# Patient Record
Sex: MALE | Race: OTHER | NOT HISPANIC OR LATINO
[De-identification: names, ages, dates, MRNs, and addresses within clinical notes are randomized per-mention and may not be internally consistent; named-entity substitution may affect disease eponyms.]

---

## 2014-08-25 RX ORDER — GABAPENTIN 400 MG/1
1 CAPSULE ORAL
Qty: 0 | Refills: 0 | COMMUNITY
Start: 2014-08-25

## 2017-01-03 ENCOUNTER — APPOINTMENT (OUTPATIENT)
Dept: INTERNAL MEDICINE | Facility: CLINIC | Age: 52
End: 2017-01-03

## 2017-01-03 VITALS
SYSTOLIC BLOOD PRESSURE: 180 MMHG | HEIGHT: 67 IN | OXYGEN SATURATION: 99 % | WEIGHT: 186 LBS | HEART RATE: 60 BPM | BODY MASS INDEX: 29.19 KG/M2 | TEMPERATURE: 97.9 F | DIASTOLIC BLOOD PRESSURE: 88 MMHG

## 2017-01-04 ENCOUNTER — CLINICAL ADVICE (OUTPATIENT)
Age: 52
End: 2017-01-04

## 2017-01-18 ENCOUNTER — LABORATORY RESULT (OUTPATIENT)
Age: 52
End: 2017-01-18

## 2017-01-18 ENCOUNTER — OUTPATIENT (OUTPATIENT)
Dept: OUTPATIENT SERVICES | Facility: HOSPITAL | Age: 52
LOS: 1 days | End: 2017-01-18

## 2017-01-18 ENCOUNTER — APPOINTMENT (OUTPATIENT)
Dept: NEPHROLOGY | Facility: CLINIC | Age: 52
End: 2017-01-18

## 2017-01-18 ENCOUNTER — OUTPATIENT (OUTPATIENT)
Dept: OUTPATIENT SERVICES | Facility: HOSPITAL | Age: 52
LOS: 1 days | End: 2017-01-18
Payer: MEDICARE

## 2017-01-18 VITALS
BODY MASS INDEX: 29.6 KG/M2 | OXYGEN SATURATION: 98 % | HEART RATE: 58 BPM | RESPIRATION RATE: 18 BRPM | WEIGHT: 189 LBS | TEMPERATURE: 98 F | DIASTOLIC BLOOD PRESSURE: 98 MMHG | SYSTOLIC BLOOD PRESSURE: 152 MMHG

## 2017-01-18 DIAGNOSIS — Z94.0 KIDNEY TRANSPLANT STATUS: ICD-10-CM

## 2017-01-18 DIAGNOSIS — Z94.0 KIDNEY TRANSPLANT STATUS: Chronic | ICD-10-CM

## 2017-01-18 DIAGNOSIS — Z95.0 PRESENCE OF CARDIAC PACEMAKER: Chronic | ICD-10-CM

## 2017-01-18 DIAGNOSIS — N18.9 CHRONIC KIDNEY DISEASE, UNSPECIFIED: ICD-10-CM

## 2017-01-18 LAB
ALBUMIN SERPL ELPH-MCNC: 4.4 G/DL — SIGNIFICANT CHANGE UP (ref 3.3–5)
ALP SERPL-CCNC: 65 U/L — SIGNIFICANT CHANGE UP (ref 40–120)
ALT FLD-CCNC: 27 U/L — SIGNIFICANT CHANGE UP (ref 10–45)
ANION GAP SERPL CALC-SCNC: 17 MMOL/L — SIGNIFICANT CHANGE UP (ref 5–17)
APPEARANCE UR: CLEAR — SIGNIFICANT CHANGE UP
AST SERPL-CCNC: 25 U/L — SIGNIFICANT CHANGE UP (ref 10–40)
BACTERIA # UR AUTO: NEGATIVE — SIGNIFICANT CHANGE UP
BASOPHILS # BLD AUTO: 0.01 K/UL — SIGNIFICANT CHANGE UP (ref 0–0.2)
BASOPHILS NFR BLD AUTO: 0.2 % — SIGNIFICANT CHANGE UP (ref 0–2)
BILIRUB SERPL-MCNC: 1 MG/DL — SIGNIFICANT CHANGE UP (ref 0.2–1.2)
BILIRUB UR-MCNC: NEGATIVE — SIGNIFICANT CHANGE UP
BUN SERPL-MCNC: 20 MG/DL — SIGNIFICANT CHANGE UP (ref 7–23)
CALCIUM SERPL-MCNC: 10 MG/DL — SIGNIFICANT CHANGE UP (ref 8.4–10.5)
CHLORIDE SERPL-SCNC: 96 MMOL/L — SIGNIFICANT CHANGE UP (ref 96–108)
CHOLEST SERPL-MCNC: 228 MG/DL — HIGH (ref 10–199)
CO2 SERPL-SCNC: 19 MMOL/L — LOW (ref 22–31)
COLOR SPEC: YELLOW — SIGNIFICANT CHANGE UP
CREAT ?TM UR-MCNC: 46 MG/DL — SIGNIFICANT CHANGE UP
CREAT SERPL-MCNC: 1.21 MG/DL — SIGNIFICANT CHANGE UP (ref 0.5–1.3)
DIFF PNL FLD: ABNORMAL
EOSINOPHIL # BLD AUTO: 1.05 K/UL — HIGH (ref 0–0.5)
EOSINOPHIL NFR BLD AUTO: 16.7 % — HIGH (ref 0–6)
EPI CELLS # UR: 1 /HPF — SIGNIFICANT CHANGE UP (ref 0–5)
GLUCOSE SERPL-MCNC: 171 MG/DL — HIGH (ref 70–99)
GLUCOSE UR QL: 250 MG/DL
HBA1C BLD-MCNC: 7.7 % — HIGH (ref 4–5.6)
HCT VFR BLD CALC: 53.2 % — HIGH (ref 39–50)
HDLC SERPL-MCNC: 46 MG/DL — SIGNIFICANT CHANGE UP (ref 40–125)
HGB BLD-MCNC: 17.1 G/DL — HIGH (ref 13–17)
IMM GRANULOCYTES NFR BLD AUTO: 0.5 % — SIGNIFICANT CHANGE UP (ref 0–1.5)
KETONES UR-MCNC: NEGATIVE — SIGNIFICANT CHANGE UP
LDH SERPL L TO P-CCNC: 238 U/L — SIGNIFICANT CHANGE UP (ref 50–242)
LEUKOCYTE ESTERASE UR-ACNC: NEGATIVE — SIGNIFICANT CHANGE UP
LIPID PNL WITH DIRECT LDL SERPL: 115 MG/DL — SIGNIFICANT CHANGE UP
LYMPHOCYTES # BLD AUTO: 1.79 K/UL — SIGNIFICANT CHANGE UP (ref 1–3.3)
LYMPHOCYTES # BLD AUTO: 28.4 % — SIGNIFICANT CHANGE UP (ref 13–44)
MAGNESIUM SERPL-MCNC: 1.8 MG/DL — SIGNIFICANT CHANGE UP (ref 1.6–2.6)
MCHC RBC-ENTMCNC: 28.3 PG — SIGNIFICANT CHANGE UP (ref 27–34)
MCHC RBC-ENTMCNC: 32.1 GM/DL — SIGNIFICANT CHANGE UP (ref 32–36)
MCV RBC AUTO: 88.1 FL — SIGNIFICANT CHANGE UP (ref 80–100)
MONOCYTES # BLD AUTO: 0.52 K/UL — SIGNIFICANT CHANGE UP (ref 0–0.9)
MONOCYTES NFR BLD AUTO: 8.3 % — SIGNIFICANT CHANGE UP (ref 2–14)
NEUTROPHILS # BLD AUTO: 2.9 K/UL — SIGNIFICANT CHANGE UP (ref 1.8–7.4)
NEUTROPHILS NFR BLD AUTO: 45.9 % — SIGNIFICANT CHANGE UP (ref 43–77)
NITRITE UR-MCNC: NEGATIVE — SIGNIFICANT CHANGE UP
PH UR: 5 — SIGNIFICANT CHANGE UP (ref 5–8)
PHOSPHATE SERPL-MCNC: 3.3 MG/DL — SIGNIFICANT CHANGE UP (ref 2.5–4.5)
PLATELET # BLD AUTO: 105 K/UL — LOW (ref 150–400)
POTASSIUM SERPL-MCNC: 5 MMOL/L — SIGNIFICANT CHANGE UP (ref 3.5–5.3)
POTASSIUM SERPL-SCNC: 5 MMOL/L — SIGNIFICANT CHANGE UP (ref 3.5–5.3)
PROT ?TM UR-MCNC: 65 MG/DL — HIGH (ref 0–12)
PROT SERPL-MCNC: 8 G/DL — SIGNIFICANT CHANGE UP (ref 6–8.3)
PROT UR-MCNC: 30 MG/DL
PROT/CREAT UR-RTO: 1.4 RATIO — HIGH (ref 0–0.2)
RBC # BLD: 6.04 M/UL — HIGH (ref 4.2–5.8)
RBC # FLD: 15.5 % — HIGH (ref 10.3–14.5)
RBC CASTS # UR COMP ASSIST: 0 /HPF — SIGNIFICANT CHANGE UP (ref 0–4)
SODIUM SERPL-SCNC: 132 MMOL/L — LOW (ref 135–145)
SP GR SPEC: 1.01 — SIGNIFICANT CHANGE UP (ref 1.01–1.02)
TACROLIMUS SERPL-MCNC: 6.3 NG/ML — SIGNIFICANT CHANGE UP
TOTAL CHOLESTEROL/HDL RATIO MEASUREMENT: 5 RATIO — SIGNIFICANT CHANGE UP (ref 3.4–9.6)
TRIGL SERPL-MCNC: 335 MG/DL — HIGH (ref 10–149)
URATE SERPL-MCNC: 7.7 MG/DL — SIGNIFICANT CHANGE UP (ref 3.4–8.8)
UROBILINOGEN FLD QL: NEGATIVE MG/DL — SIGNIFICANT CHANGE UP
WBC # BLD: 6.3 K/UL — SIGNIFICANT CHANGE UP (ref 3.8–10.5)
WBC # FLD AUTO: 6.3 K/UL — SIGNIFICANT CHANGE UP (ref 3.8–10.5)
WBC UR QL: 1 /HPF — SIGNIFICANT CHANGE UP (ref 0–5)

## 2017-01-18 PROCEDURE — 85027 COMPLETE CBC AUTOMATED: CPT

## 2017-01-18 PROCEDURE — 84100 ASSAY OF PHOSPHORUS: CPT

## 2017-01-18 PROCEDURE — 80197 ASSAY OF TACROLIMUS: CPT

## 2017-01-18 PROCEDURE — 84156 ASSAY OF PROTEIN URINE: CPT

## 2017-01-18 PROCEDURE — 87799 DETECT AGENT NOS DNA QUANT: CPT

## 2017-01-18 PROCEDURE — 80053 COMPREHEN METABOLIC PANEL: CPT

## 2017-01-18 PROCEDURE — 81001 URINALYSIS AUTO W/SCOPE: CPT

## 2017-01-18 PROCEDURE — 84550 ASSAY OF BLOOD/URIC ACID: CPT

## 2017-01-18 PROCEDURE — 83735 ASSAY OF MAGNESIUM: CPT

## 2017-01-18 PROCEDURE — 80061 LIPID PANEL: CPT

## 2017-01-18 PROCEDURE — 83615 LACTATE (LD) (LDH) ENZYME: CPT

## 2017-01-18 PROCEDURE — 83036 HEMOGLOBIN GLYCOSYLATED A1C: CPT

## 2017-01-19 LAB
CMV DNA CSF QL NAA+PROBE: SIGNIFICANT CHANGE UP
CMV DNA SPEC NAA+PROBE-LOG#: SIGNIFICANT CHANGE UP LOGIU/ML

## 2017-01-20 LAB — BKV DNA SPEC QL NAA+PROBE: SIGNIFICANT CHANGE UP

## 2017-02-03 ENCOUNTER — MEDICATION RENEWAL (OUTPATIENT)
Age: 52
End: 2017-02-03

## 2017-02-04 LAB
25(OH)D3 SERPL-MCNC: 11.4 NG/ML
CHOLEST SERPL-MCNC: 200 MG/DL
CHOLEST/HDLC SERPL: 4.7 RATIO
HBA1C MFR BLD HPLC: 7.6 %
HDLC SERPL-MCNC: 43 MG/DL
LDLC SERPL CALC-MCNC: 117 MG/DL
PSA SERPL-MCNC: 0.29 NG/ML
TRIGL SERPL-MCNC: 201 MG/DL
TSH SERPL-ACNC: 3.01 UIU/ML

## 2017-03-09 ENCOUNTER — APPOINTMENT (OUTPATIENT)
Dept: ELECTROPHYSIOLOGY | Facility: CLINIC | Age: 52
End: 2017-03-09

## 2017-03-27 ENCOUNTER — APPOINTMENT (OUTPATIENT)
Dept: NEPHROLOGY | Facility: CLINIC | Age: 52
End: 2017-03-27

## 2017-03-27 VITALS
WEIGHT: 192 LBS | SYSTOLIC BLOOD PRESSURE: 142 MMHG | RESPIRATION RATE: 16 BRPM | BODY MASS INDEX: 30.07 KG/M2 | DIASTOLIC BLOOD PRESSURE: 70 MMHG | TEMPERATURE: 97.6 F | HEART RATE: 82 BPM

## 2017-04-11 ENCOUNTER — APPOINTMENT (OUTPATIENT)
Dept: INTERNAL MEDICINE | Facility: CLINIC | Age: 52
End: 2017-04-11

## 2017-04-11 ENCOUNTER — CLINICAL ADVICE (OUTPATIENT)
Age: 52
End: 2017-04-11

## 2017-04-13 ENCOUNTER — APPOINTMENT (OUTPATIENT)
Dept: CARDIOLOGY | Facility: CLINIC | Age: 52
End: 2017-04-13

## 2017-04-13 ENCOUNTER — NON-APPOINTMENT (OUTPATIENT)
Age: 52
End: 2017-04-13

## 2017-04-13 VITALS
OXYGEN SATURATION: 97 % | HEART RATE: 60 BPM | DIASTOLIC BLOOD PRESSURE: 97 MMHG | RESPIRATION RATE: 18 BRPM | BODY MASS INDEX: 30.13 KG/M2 | SYSTOLIC BLOOD PRESSURE: 197 MMHG | HEIGHT: 67 IN | WEIGHT: 192 LBS

## 2017-04-13 VITALS — DIASTOLIC BLOOD PRESSURE: 93 MMHG | SYSTOLIC BLOOD PRESSURE: 189 MMHG

## 2017-06-01 ENCOUNTER — RX RENEWAL (OUTPATIENT)
Age: 52
End: 2017-06-01

## 2017-06-12 ENCOUNTER — APPOINTMENT (OUTPATIENT)
Dept: ELECTROPHYSIOLOGY | Facility: CLINIC | Age: 52
End: 2017-06-12

## 2017-06-12 VITALS — SYSTOLIC BLOOD PRESSURE: 179 MMHG | DIASTOLIC BLOOD PRESSURE: 92 MMHG

## 2017-06-28 ENCOUNTER — MEDICATION RENEWAL (OUTPATIENT)
Age: 52
End: 2017-06-28

## 2017-06-30 ENCOUNTER — APPOINTMENT (OUTPATIENT)
Dept: INTERNAL MEDICINE | Facility: CLINIC | Age: 52
End: 2017-06-30

## 2017-06-30 VITALS
HEIGHT: 67 IN | BODY MASS INDEX: 29.03 KG/M2 | TEMPERATURE: 98.7 F | SYSTOLIC BLOOD PRESSURE: 170 MMHG | HEART RATE: 60 BPM | OXYGEN SATURATION: 98 % | WEIGHT: 185 LBS | DIASTOLIC BLOOD PRESSURE: 80 MMHG

## 2017-06-30 LAB — HBA1C MFR BLD HPLC: 6.7

## 2017-07-10 ENCOUNTER — APPOINTMENT (OUTPATIENT)
Dept: NEPHROLOGY | Facility: CLINIC | Age: 52
End: 2017-07-10

## 2017-07-10 VITALS
DIASTOLIC BLOOD PRESSURE: 94 MMHG | BODY MASS INDEX: 29.03 KG/M2 | SYSTOLIC BLOOD PRESSURE: 191 MMHG | WEIGHT: 185 LBS | TEMPERATURE: 98.3 F | HEIGHT: 67 IN | HEART RATE: 60 BPM | RESPIRATION RATE: 17 BRPM

## 2017-07-10 RX ORDER — PEN NEEDLE, DIABETIC 32GX 5/32"
32G X 4 MM NEEDLE, DISPOSABLE MISCELLANEOUS
Qty: 100 | Refills: 0 | Status: ACTIVE | COMMUNITY
Start: 2017-06-03

## 2017-07-27 ENCOUNTER — APPOINTMENT (OUTPATIENT)
Dept: CARDIOLOGY | Facility: CLINIC | Age: 52
End: 2017-07-27
Payer: MEDICAID

## 2017-07-27 ENCOUNTER — NON-APPOINTMENT (OUTPATIENT)
Age: 52
End: 2017-07-27

## 2017-07-27 VITALS — DIASTOLIC BLOOD PRESSURE: 87 MMHG | HEART RATE: 59 BPM | SYSTOLIC BLOOD PRESSURE: 153 MMHG

## 2017-07-27 VITALS
RESPIRATION RATE: 18 BRPM | OXYGEN SATURATION: 98 % | SYSTOLIC BLOOD PRESSURE: 214 MMHG | DIASTOLIC BLOOD PRESSURE: 105 MMHG | BODY MASS INDEX: 29.03 KG/M2 | HEIGHT: 67 IN | HEART RATE: 60 BPM | WEIGHT: 185 LBS

## 2017-07-27 PROCEDURE — 93000 ELECTROCARDIOGRAM COMPLETE: CPT

## 2017-07-27 PROCEDURE — 99214 OFFICE O/P EST MOD 30 MIN: CPT

## 2017-08-28 ENCOUNTER — OTHER (OUTPATIENT)
Age: 52
End: 2017-08-28

## 2017-08-29 ENCOUNTER — OTHER (OUTPATIENT)
Age: 52
End: 2017-08-29

## 2017-09-08 ENCOUNTER — RX RENEWAL (OUTPATIENT)
Age: 52
End: 2017-09-08

## 2017-09-11 ENCOUNTER — APPOINTMENT (OUTPATIENT)
Dept: NEPHROLOGY | Facility: CLINIC | Age: 52
End: 2017-09-11

## 2017-10-02 ENCOUNTER — APPOINTMENT (OUTPATIENT)
Dept: ELECTROPHYSIOLOGY | Facility: CLINIC | Age: 52
End: 2017-10-02

## 2017-10-10 ENCOUNTER — MEDICATION RENEWAL (OUTPATIENT)
Age: 52
End: 2017-10-10

## 2017-10-10 ENCOUNTER — RX RENEWAL (OUTPATIENT)
Age: 52
End: 2017-10-10

## 2017-10-10 RX ORDER — BLOOD SUGAR DIAGNOSTIC
STRIP MISCELLANEOUS 3 TIMES DAILY
Qty: 270 | Refills: 2 | Status: ACTIVE | COMMUNITY
Start: 2017-04-11 | End: 1900-01-01

## 2017-10-10 RX ORDER — LANCETS 28 GAUGE
EACH MISCELLANEOUS
Qty: 120 | Refills: 4 | Status: ACTIVE | COMMUNITY
Start: 2017-04-11 | End: 1900-01-01

## 2017-10-12 ENCOUNTER — APPOINTMENT (OUTPATIENT)
Dept: NEPHROLOGY | Facility: CLINIC | Age: 52
End: 2017-10-12
Payer: MEDICAID

## 2017-10-12 VITALS
SYSTOLIC BLOOD PRESSURE: 156 MMHG | HEART RATE: 62 BPM | TEMPERATURE: 98 F | HEIGHT: 67 IN | WEIGHT: 184 LBS | DIASTOLIC BLOOD PRESSURE: 78 MMHG | OXYGEN SATURATION: 99 % | BODY MASS INDEX: 28.88 KG/M2 | RESPIRATION RATE: 15 BRPM

## 2017-10-12 VITALS — DIASTOLIC BLOOD PRESSURE: 80 MMHG | SYSTOLIC BLOOD PRESSURE: 136 MMHG

## 2017-10-12 PROCEDURE — 99214 OFFICE O/P EST MOD 30 MIN: CPT

## 2017-10-16 ENCOUNTER — MEDICATION RENEWAL (OUTPATIENT)
Age: 52
End: 2017-10-16

## 2017-10-16 ENCOUNTER — RX RENEWAL (OUTPATIENT)
Age: 52
End: 2017-10-16

## 2017-10-19 ENCOUNTER — APPOINTMENT (OUTPATIENT)
Dept: ELECTROPHYSIOLOGY | Facility: CLINIC | Age: 52
End: 2017-10-19
Payer: MEDICAID

## 2017-10-19 ENCOUNTER — NON-APPOINTMENT (OUTPATIENT)
Age: 52
End: 2017-10-19

## 2017-10-19 ENCOUNTER — APPOINTMENT (OUTPATIENT)
Dept: CARDIOLOGY | Facility: CLINIC | Age: 52
End: 2017-10-19
Payer: MEDICAID

## 2017-10-19 VITALS
DIASTOLIC BLOOD PRESSURE: 82 MMHG | OXYGEN SATURATION: 99 % | HEART RATE: 60 BPM | RESPIRATION RATE: 14 BRPM | SYSTOLIC BLOOD PRESSURE: 179 MMHG

## 2017-10-19 PROCEDURE — 99214 OFFICE O/P EST MOD 30 MIN: CPT

## 2017-10-19 PROCEDURE — 93000 ELECTROCARDIOGRAM COMPLETE: CPT

## 2017-10-19 PROCEDURE — 93280 PM DEVICE PROGR EVAL DUAL: CPT

## 2017-11-17 ENCOUNTER — APPOINTMENT (OUTPATIENT)
Dept: INTERNAL MEDICINE | Facility: CLINIC | Age: 52
End: 2017-11-17
Payer: MEDICAID

## 2017-11-17 VITALS
DIASTOLIC BLOOD PRESSURE: 80 MMHG | HEIGHT: 67 IN | HEART RATE: 61 BPM | BODY MASS INDEX: 28.88 KG/M2 | SYSTOLIC BLOOD PRESSURE: 178 MMHG | OXYGEN SATURATION: 98 % | WEIGHT: 184 LBS | TEMPERATURE: 98.1 F

## 2017-11-17 DIAGNOSIS — D64.9 ANEMIA, UNSPECIFIED: ICD-10-CM

## 2017-11-17 DIAGNOSIS — K59.09 OTHER CONSTIPATION: ICD-10-CM

## 2017-11-17 PROCEDURE — 99214 OFFICE O/P EST MOD 30 MIN: CPT | Mod: 25

## 2017-11-17 PROCEDURE — 90688 IIV4 VACCINE SPLT 0.5 ML IM: CPT

## 2017-11-17 PROCEDURE — G0008: CPT

## 2017-11-17 PROCEDURE — 83036 HEMOGLOBIN GLYCOSYLATED A1C: CPT | Mod: QW

## 2017-11-17 PROCEDURE — 82962 GLUCOSE BLOOD TEST: CPT

## 2017-11-17 RX ORDER — PEN NEEDLE, DIABETIC 31 GX3/16"
31G X 5 MM NEEDLE, DISPOSABLE MISCELLANEOUS
Qty: 100 | Refills: 0 | Status: ACTIVE | COMMUNITY
Start: 2017-10-10

## 2017-11-25 LAB
GLUCOSE BLDC GLUCOMTR-MCNC: 170
HBA1C MFR BLD HPLC: 7.1

## 2017-12-21 ENCOUNTER — APPOINTMENT (OUTPATIENT)
Dept: ENDOCRINOLOGY | Facility: CLINIC | Age: 52
End: 2017-12-21
Payer: MEDICAID

## 2017-12-21 VITALS
BODY MASS INDEX: 28.88 KG/M2 | DIASTOLIC BLOOD PRESSURE: 95 MMHG | HEART RATE: 60 BPM | HEIGHT: 67 IN | SYSTOLIC BLOOD PRESSURE: 140 MMHG | WEIGHT: 184 LBS | OXYGEN SATURATION: 98 %

## 2017-12-21 DIAGNOSIS — Z86.79 PERSONAL HISTORY OF OTHER DISEASES OF THE CIRCULATORY SYSTEM: ICD-10-CM

## 2017-12-21 DIAGNOSIS — J44.9 CHRONIC OBSTRUCTIVE PULMONARY DISEASE, UNSPECIFIED: ICD-10-CM

## 2017-12-21 DIAGNOSIS — Z86.19 PERSONAL HISTORY OF OTHER INFECTIOUS AND PARASITIC DISEASES: ICD-10-CM

## 2017-12-21 PROCEDURE — 99205 OFFICE O/P NEW HI 60 MIN: CPT | Mod: 25

## 2017-12-21 PROCEDURE — 36415 COLL VENOUS BLD VENIPUNCTURE: CPT

## 2017-12-21 PROCEDURE — 82962 GLUCOSE BLOOD TEST: CPT

## 2017-12-26 ENCOUNTER — OTHER (OUTPATIENT)
Age: 52
End: 2017-12-26

## 2018-01-05 ENCOUNTER — RX RENEWAL (OUTPATIENT)
Age: 53
End: 2018-01-05

## 2018-01-08 ENCOUNTER — MEDICATION RENEWAL (OUTPATIENT)
Age: 53
End: 2018-01-08

## 2018-01-11 ENCOUNTER — APPOINTMENT (OUTPATIENT)
Dept: NEPHROLOGY | Facility: CLINIC | Age: 53
End: 2018-01-11
Payer: MEDICAID

## 2018-01-11 ENCOUNTER — LABORATORY RESULT (OUTPATIENT)
Age: 53
End: 2018-01-11

## 2018-01-11 VITALS
OXYGEN SATURATION: 99 % | RESPIRATION RATE: 16 BRPM | HEIGHT: 67 IN | DIASTOLIC BLOOD PRESSURE: 75 MMHG | BODY MASS INDEX: 28.88 KG/M2 | HEART RATE: 59 BPM | WEIGHT: 184 LBS | TEMPERATURE: 98.3 F | SYSTOLIC BLOOD PRESSURE: 155 MMHG

## 2018-01-11 PROCEDURE — 99214 OFFICE O/P EST MOD 30 MIN: CPT

## 2018-01-18 ENCOUNTER — APPOINTMENT (OUTPATIENT)
Dept: CARDIOLOGY | Facility: CLINIC | Age: 53
End: 2018-01-18
Payer: MEDICAID

## 2018-01-18 ENCOUNTER — NON-APPOINTMENT (OUTPATIENT)
Age: 53
End: 2018-01-18

## 2018-01-18 VITALS
HEIGHT: 67 IN | DIASTOLIC BLOOD PRESSURE: 88 MMHG | HEART RATE: 60 BPM | WEIGHT: 184 LBS | BODY MASS INDEX: 28.88 KG/M2 | SYSTOLIC BLOOD PRESSURE: 160 MMHG | OXYGEN SATURATION: 96 %

## 2018-01-18 VITALS — DIASTOLIC BLOOD PRESSURE: 73 MMHG | SYSTOLIC BLOOD PRESSURE: 135 MMHG

## 2018-01-18 PROCEDURE — 99214 OFFICE O/P EST MOD 30 MIN: CPT

## 2018-01-18 PROCEDURE — 93000 ELECTROCARDIOGRAM COMPLETE: CPT

## 2018-01-18 RX ORDER — ASPIRIN 81 MG/1
81 TABLET ORAL
Qty: 90 | Refills: 3 | Status: DISCONTINUED | COMMUNITY
Start: 2017-10-10 | End: 2018-01-18

## 2018-01-23 ENCOUNTER — APPOINTMENT (OUTPATIENT)
Dept: VASCULAR SURGERY | Facility: CLINIC | Age: 53
End: 2018-01-23
Payer: MEDICAID

## 2018-01-23 ENCOUNTER — APPOINTMENT (OUTPATIENT)
Dept: ELECTROPHYSIOLOGY | Facility: CLINIC | Age: 53
End: 2018-01-23

## 2018-01-23 VITALS
SYSTOLIC BLOOD PRESSURE: 191 MMHG | HEART RATE: 60 BPM | WEIGHT: 184 LBS | HEIGHT: 67 IN | DIASTOLIC BLOOD PRESSURE: 89 MMHG | BODY MASS INDEX: 28.88 KG/M2 | TEMPERATURE: 98 F

## 2018-01-23 DIAGNOSIS — H26.9 UNSPECIFIED CATARACT: ICD-10-CM

## 2018-01-23 DIAGNOSIS — N18.6 END STAGE RENAL DISEASE: ICD-10-CM

## 2018-01-23 DIAGNOSIS — Z83.3 FAMILY HISTORY OF DIABETES MELLITUS: ICD-10-CM

## 2018-01-23 DIAGNOSIS — Z92.89 PERSONAL HISTORY OF OTHER MEDICAL TREATMENT: ICD-10-CM

## 2018-01-23 DIAGNOSIS — Z86.39 PERSONAL HISTORY OF OTHER ENDOCRINE, NUTRITIONAL AND METABOLIC DISEASE: ICD-10-CM

## 2018-01-23 PROCEDURE — 93970 EXTREMITY STUDY: CPT

## 2018-01-23 PROCEDURE — 99202 OFFICE O/P NEW SF 15 MIN: CPT

## 2018-01-30 LAB
ALBUMIN SERPL ELPH-MCNC: 4.5 G/DL
ALP BLD-CCNC: 60 U/L
ALT SERPL-CCNC: 33 U/L
ANION GAP SERPL CALC-SCNC: 15 MMOL/L
APPEARANCE: CLEAR
AST SERPL-CCNC: 28 U/L
BASOPHILS # BLD AUTO: 0.01 K/UL
BASOPHILS NFR BLD AUTO: 0.1 %
BILIRUB SERPL-MCNC: 0.8 MG/DL
BILIRUBIN URINE: NEGATIVE
BKV DNA SPEC QL NAA+PROBE: NORMAL
BLOOD URINE: NEGATIVE
BUN SERPL-MCNC: 22 MG/DL
CALCIUM SERPL-MCNC: 10.1 MG/DL
CHLORIDE SERPL-SCNC: 99 MMOL/L
CHOLEST SERPL-MCNC: 178 MG/DL
CHOLEST/HDLC SERPL: 4.7 RATIO
CO2 SERPL-SCNC: 25 MMOL/L
COLOR: YELLOW
CREAT SERPL-MCNC: 1.53 MG/DL
CREAT SPEC-SCNC: 103 MG/DL
CREAT/PROT UR: 0.7 RATIO
EOSINOPHIL # BLD AUTO: 1.27 K/UL
EOSINOPHIL NFR BLD AUTO: 16 %
GLUCOSE QUALITATIVE U: 1000 MG/DL
GLUCOSE SERPL-MCNC: 181 MG/DL
HBA1C MFR BLD HPLC: 7 %
HCT VFR BLD CALC: 49.8 %
HDLC SERPL-MCNC: 38 MG/DL
HGB BLD-MCNC: 15.5 G/DL
IMM GRANULOCYTES NFR BLD AUTO: 0.3 %
KETONES URINE: NEGATIVE
LDH SERPL-CCNC: 230 U/L
LDLC SERPL CALC-MCNC: 89 MG/DL
LEUKOCYTE ESTERASE URINE: NEGATIVE
LYMPHOCYTES # BLD AUTO: 2.18 K/UL
LYMPHOCYTES NFR BLD AUTO: 27.4 %
MAGNESIUM SERPL-MCNC: 1.9 MG/DL
MAN DIFF?: NORMAL
MCHC RBC-ENTMCNC: 27.3 PG
MCHC RBC-ENTMCNC: 31.1 GM/DL
MCV RBC AUTO: 87.8 FL
MONOCYTES # BLD AUTO: 0.7 K/UL
MONOCYTES NFR BLD AUTO: 8.8 %
NEUTROPHILS # BLD AUTO: 3.77 K/UL
NEUTROPHILS NFR BLD AUTO: 47.4 %
NITRITE URINE: NEGATIVE
PH URINE: 5
PHOSPHATE SERPL-MCNC: 3 MG/DL
PLATELET # BLD AUTO: 135 K/UL
POTASSIUM SERPL-SCNC: 5.6 MMOL/L
PROT SERPL-MCNC: 8.8 G/DL
PROT UR-MCNC: 69 MG/DL
PROTEIN URINE: 100 MG/DL
RBC # BLD: 5.67 M/UL
RBC # FLD: 15.1 %
SODIUM SERPL-SCNC: 139 MMOL/L
SPECIFIC GRAVITY URINE: 1.02
TACROLIMUS SERPL-MCNC: 6.3 NG/ML
TRIGL SERPL-MCNC: 257 MG/DL
URATE SERPL-MCNC: 8 MG/DL
UROBILINOGEN URINE: NEGATIVE MG/DL
WBC # FLD AUTO: 7.95 K/UL

## 2018-02-04 PROBLEM — J44.9 COPD (CHRONIC OBSTRUCTIVE PULMONARY DISEASE): Status: ACTIVE | Noted: 2018-02-04

## 2018-02-04 PROBLEM — Z86.19 HISTORY OF HEPATITIS: Status: RESOLVED | Noted: 2018-02-04 | Resolved: 2018-02-04

## 2018-02-04 LAB
25(OH)D3 SERPL-MCNC: 16.5 NG/ML
ALBUMIN SERPL ELPH-MCNC: 4.8 G/DL
ALP BLD-CCNC: 58 U/L
ALT SERPL-CCNC: 20 U/L
ANION GAP SERPL CALC-SCNC: 18 MMOL/L
AST SERPL-CCNC: 21 U/L
BASOPHILS # BLD AUTO: 0.02 K/UL
BASOPHILS NFR BLD AUTO: 0.2 %
BILIRUB SERPL-MCNC: 0.7 MG/DL
BUN SERPL-MCNC: 30 MG/DL
CALCIUM SERPL-MCNC: 10.1 MG/DL
CHLORIDE SERPL-SCNC: 99 MMOL/L
CHOLEST SERPL-MCNC: 177 MG/DL
CHOLEST/HDLC SERPL: 3.8 RATIO
CO2 SERPL-SCNC: 25 MMOL/L
CREAT SERPL-MCNC: 1.2 MG/DL
EOSINOPHIL # BLD AUTO: 1.41 K/UL
EOSINOPHIL NFR BLD AUTO: 16.6 %
GLUCOSE BLDC GLUCOMTR-MCNC: 198
GLUCOSE SERPL-MCNC: 142 MG/DL
HCT VFR BLD CALC: 47.7 %
HDLC SERPL-MCNC: 46 MG/DL
HGB BLD-MCNC: 14.2 G/DL
IMM GRANULOCYTES NFR BLD AUTO: 0.2 %
LDLC SERPL CALC-MCNC: 95 MG/DL
LYMPHOCYTES # BLD AUTO: 2.73 K/UL
LYMPHOCYTES NFR BLD AUTO: 32.1 %
MAN DIFF?: NORMAL
MCHC RBC-ENTMCNC: 26.6 PG
MCHC RBC-ENTMCNC: 29.8 GM/DL
MCV RBC AUTO: 89.5 FL
MONOCYTES # BLD AUTO: 0.67 K/UL
MONOCYTES NFR BLD AUTO: 7.9 %
NEUTROPHILS # BLD AUTO: 3.66 K/UL
NEUTROPHILS NFR BLD AUTO: 43 %
PLATELET # BLD AUTO: 140 K/UL
POTASSIUM SERPL-SCNC: 5.5 MMOL/L
PROT SERPL-MCNC: 8.5 G/DL
RBC # BLD: 5.33 M/UL
RBC # FLD: 15.3 %
SODIUM SERPL-SCNC: 142 MMOL/L
TRIGL SERPL-MCNC: 180 MG/DL
VIT B12 SERPL-MCNC: 223 PG/ML
WBC # FLD AUTO: 8.51 K/UL

## 2018-02-15 ENCOUNTER — RX RENEWAL (OUTPATIENT)
Age: 53
End: 2018-02-15

## 2018-03-20 ENCOUNTER — APPOINTMENT (OUTPATIENT)
Dept: ENDOCRINOLOGY | Facility: CLINIC | Age: 53
End: 2018-03-20

## 2018-03-23 ENCOUNTER — APPOINTMENT (OUTPATIENT)
Dept: INTERNAL MEDICINE | Facility: CLINIC | Age: 53
End: 2018-03-23
Payer: MEDICAID

## 2018-03-23 VITALS
OXYGEN SATURATION: 98 % | HEIGHT: 67 IN | TEMPERATURE: 97.8 F | BODY MASS INDEX: 29.35 KG/M2 | HEART RATE: 58 BPM | SYSTOLIC BLOOD PRESSURE: 190 MMHG | WEIGHT: 187 LBS | DIASTOLIC BLOOD PRESSURE: 98 MMHG

## 2018-03-23 VITALS — SYSTOLIC BLOOD PRESSURE: 170 MMHG | DIASTOLIC BLOOD PRESSURE: 100 MMHG

## 2018-03-23 PROCEDURE — 99214 OFFICE O/P EST MOD 30 MIN: CPT

## 2018-03-29 ENCOUNTER — APPOINTMENT (OUTPATIENT)
Dept: NEPHROLOGY | Facility: CLINIC | Age: 53
End: 2018-03-29
Payer: MEDICAID

## 2018-03-29 ENCOUNTER — LABORATORY RESULT (OUTPATIENT)
Age: 53
End: 2018-03-29

## 2018-03-29 VITALS
OXYGEN SATURATION: 99 % | HEART RATE: 60 BPM | TEMPERATURE: 98.2 F | HEIGHT: 67 IN | SYSTOLIC BLOOD PRESSURE: 204 MMHG | DIASTOLIC BLOOD PRESSURE: 94 MMHG | BODY MASS INDEX: 32.18 KG/M2 | WEIGHT: 205 LBS | RESPIRATION RATE: 14 BRPM

## 2018-03-29 VITALS — SYSTOLIC BLOOD PRESSURE: 148 MMHG | DIASTOLIC BLOOD PRESSURE: 78 MMHG

## 2018-03-29 PROCEDURE — 99214 OFFICE O/P EST MOD 30 MIN: CPT

## 2018-03-29 RX ORDER — AMOXICILLIN AND CLAVULANATE POTASSIUM 500; 125 MG/1; MG/1
500-125 TABLET, FILM COATED ORAL
Qty: 14 | Refills: 0 | Status: DISCONTINUED | COMMUNITY
Start: 2017-12-05

## 2018-03-29 RX ORDER — SILVER SULFADIAZINE 10 MG/G
1 CREAM TOPICAL
Qty: 50 | Refills: 0 | Status: DISCONTINUED | COMMUNITY
Start: 2017-12-30

## 2018-03-30 LAB
ALBUMIN SERPL ELPH-MCNC: 4.3 G/DL
ALP BLD-CCNC: 61 U/L
ALT SERPL-CCNC: 27 U/L
ANION GAP SERPL CALC-SCNC: 18 MMOL/L
APPEARANCE: CLEAR
AST SERPL-CCNC: 29 U/L
BASOPHILS # BLD AUTO: 0.02 K/UL
BASOPHILS NFR BLD AUTO: 0.2 %
BILIRUB SERPL-MCNC: 0.7 MG/DL
BILIRUBIN URINE: NEGATIVE
BKV DNA SPEC QL NAA+PROBE: NORMAL
BLOOD URINE: ABNORMAL
BUN SERPL-MCNC: 33 MG/DL
CALCIUM SERPL-MCNC: 10.6 MG/DL
CHLORIDE SERPL-SCNC: 100 MMOL/L
CHOLEST SERPL-MCNC: 213 MG/DL
CHOLEST/HDLC SERPL: 5.5 RATIO
CMV DNA SPEC QL NAA+PROBE: NOT DETECTED IU/ML
CMVPCR LOG: NOT DETECTED LOGIU/ML
CO2 SERPL-SCNC: 20 MMOL/L
COLOR: YELLOW
CREAT SERPL-MCNC: 1.46 MG/DL
CREAT SPEC-SCNC: 102 MG/DL
CREAT/PROT UR: 0.9 RATIO
EOSINOPHIL # BLD AUTO: 1.02 K/UL
EOSINOPHIL NFR BLD AUTO: 12.4 %
GLUCOSE QUALITATIVE U: 250 MG/DL
GLUCOSE SERPL-MCNC: 108 MG/DL
HBA1C MFR BLD HPLC: 7.6 %
HCT VFR BLD CALC: 54.4 %
HDLC SERPL-MCNC: 39 MG/DL
HGB BLD-MCNC: 16.9 G/DL
IMM GRANULOCYTES NFR BLD AUTO: 0.4 %
KETONES URINE: NEGATIVE
LDH SERPL-CCNC: 254 U/L
LDLC SERPL CALC-MCNC: 111 MG/DL
LEUKOCYTE ESTERASE URINE: NEGATIVE
LYMPHOCYTES # BLD AUTO: 2.37 K/UL
LYMPHOCYTES NFR BLD AUTO: 28.8 %
MAGNESIUM SERPL-MCNC: 2 MG/DL
MAN DIFF?: NORMAL
MCHC RBC-ENTMCNC: 27.9 PG
MCHC RBC-ENTMCNC: 31.1 GM/DL
MCV RBC AUTO: 89.8 FL
MONOCYTES # BLD AUTO: 1.08 K/UL
MONOCYTES NFR BLD AUTO: 13.1 %
NEUTROPHILS # BLD AUTO: 3.7 K/UL
NEUTROPHILS NFR BLD AUTO: 45.1 %
NITRITE URINE: NEGATIVE
PH URINE: 5.5
PHOSPHATE SERPL-MCNC: 2.5 MG/DL
PLATELET # BLD AUTO: 114 K/UL
POTASSIUM SERPL-SCNC: 4.9 MMOL/L
PROT SERPL-MCNC: 8.5 G/DL
PROT UR-MCNC: 96 MG/DL
PROTEIN URINE: 100 MG/DL
RBC # BLD: 6.06 M/UL
RBC # FLD: 14.7 %
SODIUM SERPL-SCNC: 138 MMOL/L
SPECIFIC GRAVITY URINE: 1.02
TACROLIMUS SERPL-MCNC: 8.8 NG/ML
TRIGL SERPL-MCNC: 316 MG/DL
URATE SERPL-MCNC: 8 MG/DL
UROBILINOGEN URINE: NEGATIVE MG/DL
WBC # FLD AUTO: 8.22 K/UL

## 2018-05-03 ENCOUNTER — APPOINTMENT (OUTPATIENT)
Dept: CARDIOLOGY | Facility: CLINIC | Age: 53
End: 2018-05-03
Payer: MEDICAID

## 2018-05-03 ENCOUNTER — APPOINTMENT (OUTPATIENT)
Dept: ELECTROPHYSIOLOGY | Facility: CLINIC | Age: 53
End: 2018-05-03
Payer: MEDICAID

## 2018-05-03 ENCOUNTER — NON-APPOINTMENT (OUTPATIENT)
Age: 53
End: 2018-05-03

## 2018-05-03 VITALS — SYSTOLIC BLOOD PRESSURE: 155 MMHG | DIASTOLIC BLOOD PRESSURE: 90 MMHG

## 2018-05-03 VITALS
HEIGHT: 67 IN | HEART RATE: 60 BPM | BODY MASS INDEX: 32.18 KG/M2 | WEIGHT: 205 LBS | OXYGEN SATURATION: 99 % | RESPIRATION RATE: 14 BRPM | DIASTOLIC BLOOD PRESSURE: 108 MMHG | SYSTOLIC BLOOD PRESSURE: 235 MMHG

## 2018-05-03 PROCEDURE — 99214 OFFICE O/P EST MOD 30 MIN: CPT

## 2018-05-03 PROCEDURE — 93280 PM DEVICE PROGR EVAL DUAL: CPT

## 2018-05-03 PROCEDURE — 93000 ELECTROCARDIOGRAM COMPLETE: CPT | Mod: 59

## 2018-05-25 ENCOUNTER — RX RENEWAL (OUTPATIENT)
Age: 53
End: 2018-05-25

## 2018-06-05 ENCOUNTER — MEDICATION RENEWAL (OUTPATIENT)
Age: 53
End: 2018-06-05

## 2018-06-22 ENCOUNTER — RX RENEWAL (OUTPATIENT)
Age: 53
End: 2018-06-22

## 2018-07-01 ENCOUNTER — OUTPATIENT (OUTPATIENT)
Dept: OUTPATIENT SERVICES | Facility: HOSPITAL | Age: 53
LOS: 1 days | End: 2018-07-01
Payer: MEDICAID

## 2018-07-01 DIAGNOSIS — Z94.0 KIDNEY TRANSPLANT STATUS: Chronic | ICD-10-CM

## 2018-07-01 DIAGNOSIS — Z95.0 PRESENCE OF CARDIAC PACEMAKER: Chronic | ICD-10-CM

## 2018-07-02 ENCOUNTER — LABORATORY RESULT (OUTPATIENT)
Age: 53
End: 2018-07-02

## 2018-07-02 ENCOUNTER — APPOINTMENT (OUTPATIENT)
Dept: NEPHROLOGY | Facility: CLINIC | Age: 53
End: 2018-07-02
Payer: MEDICAID

## 2018-07-02 VITALS — DIASTOLIC BLOOD PRESSURE: 70 MMHG | SYSTOLIC BLOOD PRESSURE: 140 MMHG

## 2018-07-02 VITALS
SYSTOLIC BLOOD PRESSURE: 214 MMHG | TEMPERATURE: 98.5 F | HEIGHT: 67 IN | OXYGEN SATURATION: 97 % | BODY MASS INDEX: 28.71 KG/M2 | DIASTOLIC BLOOD PRESSURE: 108 MMHG | RESPIRATION RATE: 16 BRPM | WEIGHT: 182.9 LBS | HEART RATE: 60 BPM

## 2018-07-02 PROCEDURE — 99214 OFFICE O/P EST MOD 30 MIN: CPT

## 2018-07-06 ENCOUNTER — APPOINTMENT (OUTPATIENT)
Dept: INTERNAL MEDICINE | Facility: CLINIC | Age: 53
End: 2018-07-06
Payer: MEDICAID

## 2018-07-06 VITALS
DIASTOLIC BLOOD PRESSURE: 80 MMHG | HEART RATE: 59 BPM | SYSTOLIC BLOOD PRESSURE: 150 MMHG | OXYGEN SATURATION: 98 % | TEMPERATURE: 97.9 F

## 2018-07-06 PROCEDURE — 99396 PREV VISIT EST AGE 40-64: CPT | Mod: 25

## 2018-07-06 PROCEDURE — 99215 OFFICE O/P EST HI 40 MIN: CPT

## 2018-07-06 NOTE — PHYSICAL EXAM
[No Acute Distress] : no acute distress [Well Nourished] : well nourished [Supple] : supple [No Lymphadenopathy] : no lymphadenopathy [Clear to Auscultation] : lungs were clear to auscultation bilaterally [No Accessory Muscle Use] : no accessory muscle use [Regular Rhythm] : with a regular rhythm [Normal S1, S2] : normal S1 and S2 [Normal Oropharynx] : the oropharynx was normal [Soft] : abdomen soft [Non Tender] : non-tender [Normal Posterior Cervical Nodes] : no posterior cervical lymphadenopathy [Normal Anterior Cervical Nodes] : no anterior cervical lymphadenopathy [Normal Affect] : the affect was normal [Normal Insight/Judgement] : insight and judgment were intact [de-identified] : pinpoint pupils [de-identified] : L distal hand + thrill from AV fistula [de-identified] : L inner foot with 1cm healing wound; 2nd toe with hyperpigmentation on tip [de-identified] : walks with a cane

## 2018-07-06 NOTE — HISTORY OF PRESENT ILLNESS
[FreeTextEntry1] : mrsa bacteremia\par kidney transplant\par Htn. HLD\par T2DM\par cpe [de-identified] : 52yo M with hx of kidney transplant (on immunosuppressives), T2DM, HTN, HLD, CAD and PPM here for CPE and follow up after hospitalization.\par \par Hospitalization:\par Pt was hospitalized at [facility] in [place] from 6/19 to 6/25/2018 with dc dx of MRSA bacteremia. he is currently on daptomycin 500mg iv Q24 until 8/1/18. He had a script for linezolid that was for a few days past dc. I had previously spoke to the SW from the hospitalization who confirm part of the hx. \par \par Pt was SOB while driving he says back to [place] from [place]. In [place] he began to feel unwell, felt SOB. He stopped at the side of the road. He was later found unresponsive in the car. He was confused and had elevated temp (hyperthermia). Head CT showed no acute process but indeterminate lacunar infarct, CT thorax showed atelectasis. Pt had blood cx which grew MRSA - ECHO showed no valve involvement. Pt had a L foot lesion believed to be the source of the infection. The BERNARD results showed PAD.   \par \par He is set up with [place] based co called  [facility] for IV infusion. Nurse visited for initial 2 days but no longer.\par \par [ ]Needs weekly cbc, cpk, bmp   [ ] needs ID referral (already has appt scheduled  [ ] Needs home health referral [ ] podiatry

## 2018-07-06 NOTE — ASSESSMENT
[FreeTextEntry1] : 54yo M with esrd, T2DM, HTN here for cpe and hospital follow up.\par \par MRSA bacteremia - on abx until 8/1/18 - will place home health referral to get assistance with vitals (BP, temp) and infusion of abx as pt was out of saline flushes and used tap water - I informed pt to use only normal saline for PICC flushes. \par \par Kidney transplant - records state pt had an cr of 2.1 that later normalized to 1.1 while hospitalized - labs last week with nephrology show normal kidney function (cr 1.2)\par \par T2DM on insulin, controlled, cont meds. sees ophthol Needs to see podiatry\par \par HTN - systolic BP elevated - reassess at next OV - had increased losartan to 50mg in the spring\par \par HCM - cpe today, plan to repeat labs next week\par            had colo\par            Hep C neg\par            Need to revisit vaccinations\par \par MISC - needs letter for social security changed regarding his mobility\par \par >50% of this 40 minute visit was spent in face-to-face time counseling patient on management of pt with bacteremia and IV antibiotics.

## 2018-07-09 ENCOUNTER — MEDICATION RENEWAL (OUTPATIENT)
Age: 53
End: 2018-07-09

## 2018-07-10 RX ORDER — BEPOTASTINE BESILATE 1.5 %
1 DROPS OPHTHALMIC (EYE)
Qty: 0 | Refills: 0 | COMMUNITY
Start: 2018-07-10

## 2018-07-11 ENCOUNTER — INPATIENT (INPATIENT)
Facility: HOSPITAL | Age: 53
LOS: 6 days | Discharge: HOME HEALTH | End: 2018-07-18
Attending: INTERNAL MEDICINE | Admitting: INTERNAL MEDICINE
Payer: MEDICAID

## 2018-07-11 VITALS
DIASTOLIC BLOOD PRESSURE: 77 MMHG | OXYGEN SATURATION: 98 % | SYSTOLIC BLOOD PRESSURE: 133 MMHG | HEART RATE: 60 BPM | RESPIRATION RATE: 18 BRPM | TEMPERATURE: 98 F

## 2018-07-11 DIAGNOSIS — Z29.9 ENCOUNTER FOR PROPHYLACTIC MEASURES, UNSPECIFIED: ICD-10-CM

## 2018-07-11 DIAGNOSIS — A41.9 SEPSIS, UNSPECIFIED ORGANISM: ICD-10-CM

## 2018-07-11 DIAGNOSIS — I25.10 ATHEROSCLEROTIC HEART DISEASE OF NATIVE CORONARY ARTERY WITHOUT ANGINA PECTORIS: ICD-10-CM

## 2018-07-11 DIAGNOSIS — Z94.0 KIDNEY TRANSPLANT STATUS: Chronic | ICD-10-CM

## 2018-07-11 DIAGNOSIS — Z95.0 PRESENCE OF CARDIAC PACEMAKER: Chronic | ICD-10-CM

## 2018-07-11 DIAGNOSIS — R82.90 UNSPECIFIED ABNORMAL FINDINGS IN URINE: ICD-10-CM

## 2018-07-11 DIAGNOSIS — R68.89 OTHER GENERAL SYMPTOMS AND SIGNS: ICD-10-CM

## 2018-07-11 DIAGNOSIS — E78.00 PURE HYPERCHOLESTEROLEMIA, UNSPECIFIED: ICD-10-CM

## 2018-07-11 DIAGNOSIS — E11.9 TYPE 2 DIABETES MELLITUS WITHOUT COMPLICATIONS: ICD-10-CM

## 2018-07-11 DIAGNOSIS — N17.9 ACUTE KIDNEY FAILURE, UNSPECIFIED: ICD-10-CM

## 2018-07-11 DIAGNOSIS — Z94.0 KIDNEY TRANSPLANT STATUS: ICD-10-CM

## 2018-07-11 DIAGNOSIS — I10 ESSENTIAL (PRIMARY) HYPERTENSION: ICD-10-CM

## 2018-07-11 LAB
ALBUMIN SERPL ELPH-MCNC: 3.7 G/DL — SIGNIFICANT CHANGE UP (ref 3.3–5)
ALP SERPL-CCNC: 73 U/L — SIGNIFICANT CHANGE UP (ref 40–120)
ALT FLD-CCNC: 23 U/L — SIGNIFICANT CHANGE UP (ref 4–41)
APPEARANCE UR: SIGNIFICANT CHANGE UP
AST SERPL-CCNC: 29 U/L — SIGNIFICANT CHANGE UP (ref 4–40)
BASE EXCESS BLDV CALC-SCNC: -1.6 MMOL/L — SIGNIFICANT CHANGE UP
BASE EXCESS BLDV CALC-SCNC: -3.4 MMOL/L — SIGNIFICANT CHANGE UP
BASOPHILS # BLD AUTO: 0.04 K/UL — SIGNIFICANT CHANGE UP (ref 0–0.2)
BASOPHILS NFR BLD AUTO: 0.7 % — SIGNIFICANT CHANGE UP (ref 0–2)
BASOPHILS NFR SPEC: 0.9 % — SIGNIFICANT CHANGE UP (ref 0–2)
BILIRUB SERPL-MCNC: 1.3 MG/DL — HIGH (ref 0.2–1.2)
BILIRUB UR-MCNC: NEGATIVE — SIGNIFICANT CHANGE UP
BLASTS # FLD: 0 % — SIGNIFICANT CHANGE UP (ref 0–0)
BLOOD GAS VENOUS - CREATININE: 2.21 MG/DL — HIGH (ref 0.5–1.3)
BLOOD GAS VENOUS - CREATININE: 2.22 MG/DL — HIGH (ref 0.5–1.3)
BLOOD UR QL VISUAL: HIGH
BUN SERPL-MCNC: 30 MG/DL — HIGH (ref 7–23)
CALCIUM SERPL-MCNC: 9.9 MG/DL — SIGNIFICANT CHANGE UP (ref 8.4–10.5)
CHLORIDE BLDV-SCNC: 104 MMOL/L — SIGNIFICANT CHANGE UP (ref 96–108)
CHLORIDE BLDV-SCNC: 107 MMOL/L — SIGNIFICANT CHANGE UP (ref 96–108)
CHLORIDE SERPL-SCNC: 95 MMOL/L — LOW (ref 98–107)
CO2 SERPL-SCNC: 20 MMOL/L — LOW (ref 22–31)
COLOR SPEC: SIGNIFICANT CHANGE UP
CREAT ?TM UR-MCNC: 113.6 MG/DL — SIGNIFICANT CHANGE UP
CREAT SERPL-MCNC: 2.2 MG/DL — HIGH (ref 0.5–1.3)
EOSINOPHIL # BLD AUTO: 0.2 K/UL — SIGNIFICANT CHANGE UP (ref 0–0.5)
EOSINOPHIL NFR BLD AUTO: 3.3 % — SIGNIFICANT CHANGE UP (ref 0–6)
EOSINOPHIL NFR FLD: 3.5 % — SIGNIFICANT CHANGE UP (ref 0–6)
GAS PNL BLDV: 128 MMOL/L — LOW (ref 136–146)
GAS PNL BLDV: 130 MMOL/L — LOW (ref 136–146)
GIANT PLATELETS BLD QL SMEAR: PRESENT — SIGNIFICANT CHANGE UP
GLUCOSE BLDV-MCNC: 153 — HIGH (ref 70–99)
GLUCOSE BLDV-MCNC: 173 — HIGH (ref 70–99)
GLUCOSE SERPL-MCNC: 169 MG/DL — HIGH (ref 70–99)
GLUCOSE UR-MCNC: 50 — SIGNIFICANT CHANGE UP
HCO3 BLDV-SCNC: 21 MMOL/L — SIGNIFICANT CHANGE UP (ref 20–27)
HCO3 BLDV-SCNC: 22 MMOL/L — SIGNIFICANT CHANGE UP (ref 20–27)
HCT VFR BLD CALC: 43 % — SIGNIFICANT CHANGE UP (ref 39–50)
HCT VFR BLDV CALC: 37.7 % — LOW (ref 39–51)
HCT VFR BLDV CALC: 41.7 % — SIGNIFICANT CHANGE UP (ref 39–51)
HGB BLD-MCNC: 13.4 G/DL — SIGNIFICANT CHANGE UP (ref 13–17)
HGB BLDV-MCNC: 12.3 G/DL — LOW (ref 13–17)
HGB BLDV-MCNC: 13.6 G/DL — SIGNIFICANT CHANGE UP (ref 13–17)
IMM GRANULOCYTES # BLD AUTO: 0.03 # — SIGNIFICANT CHANGE UP
IMM GRANULOCYTES NFR BLD AUTO: 0.5 % — SIGNIFICANT CHANGE UP (ref 0–1.5)
KETONES UR-MCNC: NEGATIVE — SIGNIFICANT CHANGE UP
LACTATE BLDV-MCNC: 1.1 MMOL/L — SIGNIFICANT CHANGE UP (ref 0.5–2)
LACTATE BLDV-MCNC: 2.5 MMOL/L — HIGH (ref 0.5–2)
LEUKOCYTE ESTERASE UR-ACNC: HIGH
LYMPHOCYTES # BLD AUTO: 0.6 K/UL — LOW (ref 1–3.3)
LYMPHOCYTES # BLD AUTO: 10 % — LOW (ref 13–44)
LYMPHOCYTES NFR SPEC AUTO: 3.5 % — LOW (ref 13–44)
MACROCYTES BLD QL: SLIGHT — SIGNIFICANT CHANGE UP
MCHC RBC-ENTMCNC: 26.9 PG — LOW (ref 27–34)
MCHC RBC-ENTMCNC: 31.2 % — LOW (ref 32–36)
MCV RBC AUTO: 86.2 FL — SIGNIFICANT CHANGE UP (ref 80–100)
METAMYELOCYTES # FLD: 0 % — SIGNIFICANT CHANGE UP (ref 0–1)
MICROCYTES BLD QL: SLIGHT — SIGNIFICANT CHANGE UP
MONOCYTES # BLD AUTO: 0.24 K/UL — SIGNIFICANT CHANGE UP (ref 0–0.9)
MONOCYTES NFR BLD AUTO: 4 % — SIGNIFICANT CHANGE UP (ref 2–14)
MONOCYTES NFR BLD: 1.7 % — LOW (ref 2–9)
MUCOUS THREADS # UR AUTO: SIGNIFICANT CHANGE UP
MYELOCYTES NFR BLD: 0 % — SIGNIFICANT CHANGE UP (ref 0–0)
NEUTROPHIL AB SER-ACNC: 71.3 % — SIGNIFICANT CHANGE UP (ref 43–77)
NEUTROPHILS # BLD AUTO: 4.9 K/UL — SIGNIFICANT CHANGE UP (ref 1.8–7.4)
NEUTROPHILS NFR BLD AUTO: 81.5 % — HIGH (ref 43–77)
NEUTS BAND # BLD: 15.6 % — HIGH (ref 0–6)
NITRITE UR-MCNC: NEGATIVE — SIGNIFICANT CHANGE UP
NRBC # FLD: 0 — SIGNIFICANT CHANGE UP
OTHER - HEMATOLOGY %: 0.9 — SIGNIFICANT CHANGE UP
PCO2 BLDV: 36 MMHG — LOW (ref 41–51)
PCO2 BLDV: 39 MMHG — LOW (ref 41–51)
PH BLDV: 7.38 PH — SIGNIFICANT CHANGE UP (ref 7.32–7.43)
PH BLDV: 7.38 PH — SIGNIFICANT CHANGE UP (ref 7.32–7.43)
PH UR: 6 — SIGNIFICANT CHANGE UP (ref 4.6–8)
PLATELET # BLD AUTO: 110 K/UL — LOW (ref 150–400)
PLATELET COUNT - ESTIMATE: SIGNIFICANT CHANGE UP
PMV BLD: 12.4 FL — SIGNIFICANT CHANGE UP (ref 7–13)
PO2 BLDV: 27 MMHG — LOW (ref 35–40)
PO2 BLDV: < 24 MMHG — LOW (ref 35–40)
POTASSIUM BLDV-SCNC: 4.2 MMOL/L — SIGNIFICANT CHANGE UP (ref 3.4–4.5)
POTASSIUM BLDV-SCNC: 4.4 MMOL/L — SIGNIFICANT CHANGE UP (ref 3.4–4.5)
POTASSIUM SERPL-MCNC: 4.6 MMOL/L — SIGNIFICANT CHANGE UP (ref 3.5–5.3)
POTASSIUM SERPL-SCNC: 4.6 MMOL/L — SIGNIFICANT CHANGE UP (ref 3.5–5.3)
PROMYELOCYTES # FLD: 0 % — SIGNIFICANT CHANGE UP (ref 0–0)
PROT SERPL-MCNC: 7.8 G/DL — SIGNIFICANT CHANGE UP (ref 6–8.3)
PROT UR-MCNC: 300 MG/DL — SIGNIFICANT CHANGE UP
RBC # BLD: 4.99 M/UL — SIGNIFICANT CHANGE UP (ref 4.2–5.8)
RBC # FLD: 14.6 % — HIGH (ref 10.3–14.5)
RBC CASTS # UR COMP ASSIST: >50 — HIGH (ref 0–?)
REVIEW TO FOLLOW: YES — SIGNIFICANT CHANGE UP
SAO2 % BLDV: 23.5 % — LOW (ref 60–85)
SAO2 % BLDV: 45.5 % — LOW (ref 60–85)
SODIUM SERPL-SCNC: 131 MMOL/L — LOW (ref 135–145)
SODIUM UR-SCNC: 35 MMOL/L — SIGNIFICANT CHANGE UP
SP GR SPEC: 1.01 — SIGNIFICANT CHANGE UP (ref 1–1.04)
SQUAMOUS # UR AUTO: SIGNIFICANT CHANGE UP
UROBILINOGEN FLD QL: NORMAL MG/DL — SIGNIFICANT CHANGE UP
UUN UR-MCNC: 479 MG/DL — SIGNIFICANT CHANGE UP
VARIANT LYMPHS # BLD: 2.6 % — SIGNIFICANT CHANGE UP
WBC # BLD: 6.01 K/UL — SIGNIFICANT CHANGE UP (ref 3.8–10.5)
WBC # FLD AUTO: 6.01 K/UL — SIGNIFICANT CHANGE UP (ref 3.8–10.5)
WBC UR QL: SIGNIFICANT CHANGE UP (ref 0–?)

## 2018-07-11 PROCEDURE — 73610 X-RAY EXAM OF ANKLE: CPT | Mod: 26,LT

## 2018-07-11 PROCEDURE — 99223 1ST HOSP IP/OBS HIGH 75: CPT

## 2018-07-11 PROCEDURE — 71045 X-RAY EXAM CHEST 1 VIEW: CPT | Mod: 26

## 2018-07-11 PROCEDURE — 99223 1ST HOSP IP/OBS HIGH 75: CPT | Mod: GC

## 2018-07-11 RX ORDER — ATOVAQUONE 750 MG/5ML
1500 SUSPENSION ORAL DAILY
Qty: 0 | Refills: 0 | Status: DISCONTINUED | OUTPATIENT
Start: 2018-07-11 | End: 2018-07-18

## 2018-07-11 RX ORDER — PIPERACILLIN AND TAZOBACTAM 4; .5 G/20ML; G/20ML
3.38 INJECTION, POWDER, LYOPHILIZED, FOR SOLUTION INTRAVENOUS ONCE
Qty: 0 | Refills: 0 | Status: COMPLETED | OUTPATIENT
Start: 2018-07-11 | End: 2018-07-11

## 2018-07-11 RX ORDER — DEXTROSE 50 % IN WATER 50 %
15 SYRINGE (ML) INTRAVENOUS ONCE
Qty: 0 | Refills: 0 | Status: DISCONTINUED | OUTPATIENT
Start: 2018-07-11 | End: 2018-07-18

## 2018-07-11 RX ORDER — PANTOPRAZOLE SODIUM 20 MG/1
40 TABLET, DELAYED RELEASE ORAL
Qty: 0 | Refills: 0 | Status: DISCONTINUED | OUTPATIENT
Start: 2018-07-11 | End: 2018-07-18

## 2018-07-11 RX ORDER — VANCOMYCIN HCL 1 G
1000 VIAL (EA) INTRAVENOUS ONCE
Qty: 0 | Refills: 0 | Status: COMPLETED | OUTPATIENT
Start: 2018-07-11 | End: 2018-07-11

## 2018-07-11 RX ORDER — CLOPIDOGREL BISULFATE 75 MG/1
75 TABLET, FILM COATED ORAL DAILY
Qty: 0 | Refills: 0 | Status: DISCONTINUED | OUTPATIENT
Start: 2018-07-11 | End: 2018-07-18

## 2018-07-11 RX ORDER — MYCOPHENOLIC ACID 180 MG/1
360 TABLET, DELAYED RELEASE ORAL
Qty: 0 | Refills: 0 | Status: DISCONTINUED | OUTPATIENT
Start: 2018-07-11 | End: 2018-07-13

## 2018-07-11 RX ORDER — MYCOPHENOLIC ACID 180 MG/1
720 TABLET, DELAYED RELEASE ORAL
Qty: 0 | Refills: 0 | Status: DISCONTINUED | OUTPATIENT
Start: 2018-07-11 | End: 2018-07-11

## 2018-07-11 RX ORDER — ASPIRIN/CALCIUM CARB/MAGNESIUM 324 MG
81 TABLET ORAL DAILY
Qty: 0 | Refills: 0 | Status: DISCONTINUED | OUTPATIENT
Start: 2018-07-11 | End: 2018-07-18

## 2018-07-11 RX ORDER — SODIUM CHLORIDE 9 MG/ML
1000 INJECTION, SOLUTION INTRAVENOUS
Qty: 0 | Refills: 0 | Status: DISCONTINUED | OUTPATIENT
Start: 2018-07-11 | End: 2018-07-18

## 2018-07-11 RX ORDER — DEXTROSE 50 % IN WATER 50 %
25 SYRINGE (ML) INTRAVENOUS ONCE
Qty: 0 | Refills: 0 | Status: DISCONTINUED | OUTPATIENT
Start: 2018-07-11 | End: 2018-07-18

## 2018-07-11 RX ORDER — ACETAMINOPHEN 500 MG
650 TABLET ORAL ONCE
Qty: 0 | Refills: 0 | Status: COMPLETED | OUTPATIENT
Start: 2018-07-11 | End: 2018-07-11

## 2018-07-11 RX ORDER — TACROLIMUS 5 MG/1
0.5 CAPSULE ORAL EVERY 12 HOURS
Qty: 0 | Refills: 0 | Status: DISCONTINUED | OUTPATIENT
Start: 2018-07-11 | End: 2018-07-11

## 2018-07-11 RX ORDER — GLUCAGON INJECTION, SOLUTION 0.5 MG/.1ML
1 INJECTION, SOLUTION SUBCUTANEOUS ONCE
Qty: 0 | Refills: 0 | Status: DISCONTINUED | OUTPATIENT
Start: 2018-07-11 | End: 2018-07-18

## 2018-07-11 RX ORDER — HEPARIN SODIUM 5000 [USP'U]/ML
5000 INJECTION INTRAVENOUS; SUBCUTANEOUS EVERY 12 HOURS
Qty: 0 | Refills: 0 | Status: DISCONTINUED | OUTPATIENT
Start: 2018-07-11 | End: 2018-07-18

## 2018-07-11 RX ORDER — CEFEPIME 1 G/1
1000 INJECTION, POWDER, FOR SOLUTION INTRAMUSCULAR; INTRAVENOUS EVERY 24 HOURS
Qty: 0 | Refills: 0 | Status: DISCONTINUED | OUTPATIENT
Start: 2018-07-12 | End: 2018-07-13

## 2018-07-11 RX ORDER — INSULIN LISPRO 100/ML
VIAL (ML) SUBCUTANEOUS AT BEDTIME
Qty: 0 | Refills: 0 | Status: DISCONTINUED | OUTPATIENT
Start: 2018-07-11 | End: 2018-07-18

## 2018-07-11 RX ORDER — ISOSORBIDE MONONITRATE 60 MG/1
30 TABLET, EXTENDED RELEASE ORAL DAILY
Qty: 0 | Refills: 0 | Status: DISCONTINUED | OUTPATIENT
Start: 2018-07-11 | End: 2018-07-13

## 2018-07-11 RX ORDER — DEXTROSE 50 % IN WATER 50 %
12.5 SYRINGE (ML) INTRAVENOUS ONCE
Qty: 0 | Refills: 0 | Status: DISCONTINUED | OUTPATIENT
Start: 2018-07-11 | End: 2018-07-18

## 2018-07-11 RX ORDER — SODIUM CHLORIDE 9 MG/ML
1000 INJECTION INTRAMUSCULAR; INTRAVENOUS; SUBCUTANEOUS
Qty: 0 | Refills: 0 | Status: DISCONTINUED | OUTPATIENT
Start: 2018-07-11 | End: 2018-07-12

## 2018-07-11 RX ORDER — TACROLIMUS 5 MG/1
1.5 CAPSULE ORAL EVERY 12 HOURS
Qty: 0 | Refills: 0 | Status: DISCONTINUED | OUTPATIENT
Start: 2018-07-11 | End: 2018-07-18

## 2018-07-11 RX ORDER — LEVETIRACETAM 250 MG/1
500 TABLET, FILM COATED ORAL
Qty: 0 | Refills: 0 | Status: DISCONTINUED | OUTPATIENT
Start: 2018-07-11 | End: 2018-07-18

## 2018-07-11 RX ORDER — INSULIN LISPRO 100/ML
VIAL (ML) SUBCUTANEOUS
Qty: 0 | Refills: 0 | Status: DISCONTINUED | OUTPATIENT
Start: 2018-07-11 | End: 2018-07-18

## 2018-07-11 RX ORDER — VANCOMYCIN HCL 1 G
1000 VIAL (EA) INTRAVENOUS EVERY 24 HOURS
Qty: 0 | Refills: 0 | Status: DISCONTINUED | OUTPATIENT
Start: 2018-07-12 | End: 2018-07-13

## 2018-07-11 RX ORDER — SODIUM CHLORIDE 9 MG/ML
1000 INJECTION INTRAMUSCULAR; INTRAVENOUS; SUBCUTANEOUS ONCE
Qty: 0 | Refills: 0 | Status: COMPLETED | OUTPATIENT
Start: 2018-07-11 | End: 2018-07-11

## 2018-07-11 RX ADMIN — Medication 250 MILLIGRAM(S): at 14:26

## 2018-07-11 RX ADMIN — MYCOPHENOLIC ACID 360 MILLIGRAM(S): 180 TABLET, DELAYED RELEASE ORAL at 21:46

## 2018-07-11 RX ADMIN — PIPERACILLIN AND TAZOBACTAM 200 GRAM(S): 4; .5 INJECTION, POWDER, LYOPHILIZED, FOR SOLUTION INTRAVENOUS at 13:45

## 2018-07-11 RX ADMIN — CLOPIDOGREL BISULFATE 75 MILLIGRAM(S): 75 TABLET, FILM COATED ORAL at 18:42

## 2018-07-11 RX ADMIN — Medication 650 MILLIGRAM(S): at 21:44

## 2018-07-11 RX ADMIN — ISOSORBIDE MONONITRATE 30 MILLIGRAM(S): 60 TABLET, EXTENDED RELEASE ORAL at 21:53

## 2018-07-11 RX ADMIN — Medication 650 MILLIGRAM(S): at 22:15

## 2018-07-11 RX ADMIN — Medication 81 MILLIGRAM(S): at 18:42

## 2018-07-11 RX ADMIN — HEPARIN SODIUM 5000 UNIT(S): 5000 INJECTION INTRAVENOUS; SUBCUTANEOUS at 18:42

## 2018-07-11 RX ADMIN — LEVETIRACETAM 500 MILLIGRAM(S): 250 TABLET, FILM COATED ORAL at 18:42

## 2018-07-11 RX ADMIN — TACROLIMUS 1.5 MILLIGRAM(S): 5 CAPSULE ORAL at 21:45

## 2018-07-11 RX ADMIN — Medication 1: at 18:43

## 2018-07-11 RX ADMIN — SODIUM CHLORIDE 75 MILLILITER(S): 9 INJECTION INTRAMUSCULAR; INTRAVENOUS; SUBCUTANEOUS at 21:53

## 2018-07-11 RX ADMIN — Medication 2: at 23:11

## 2018-07-11 RX ADMIN — SODIUM CHLORIDE 1000 MILLILITER(S): 9 INJECTION INTRAMUSCULAR; INTRAVENOUS; SUBCUTANEOUS at 12:36

## 2018-07-11 NOTE — H&P ADULT - NSHPPHYSICALEXAM_GEN_ALL_CORE
Vital Signs Last 24 Hrs  T(C): 36.8 (11 Jul 2018 15:34), Max: 37.6 (11 Jul 2018 12:00)  T(F): 98.2 (11 Jul 2018 15:34), Max: 99.7 (11 Jul 2018 12:00)  HR: 59 (11 Jul 2018 15:34) (59 - 60)  BP: 140/65 (11 Jul 2018 15:34) (118/59 - 140/65)  BP(mean): --  RR: 17 (11 Jul 2018 15:34) (17 - 18)  SpO2: 100% (11 Jul 2018 15:34) (98% - 100%)      PHYSICAL EXAM:  General: Alert and cooperative. Not in acute stress. Well developed, well nourished.   Head: Normocephalic, no mass and lesions.  Eyes: Intact visual fields. PERRLA, clear conjunctiva. EOMI, no ptosis.   Throat: Oral cavity and pharynx normal. No inflammation, swelling, exudate, or lesions. Teeth and gingiva in good general condition.  Neck: No lymphadenopathy, no masses, no thyromegaly. Carotid pulses 2+. No JVD.   Respiratory: Bilateral lung clear to auscultation, no crackles, no wheezes, no rhonchi.   Cardiovascular: S1/S2 auscultated, no murmur, or gallop. Rhythm is regular. There is no peripheral edema, cyanosis or pallor. Extremities are warm and well perfused. Capillary refill is less than 2 seconds. No carotid bruits.  Abdomen: Soft, non-tender, nondistended, no guarding or rebound tenderness. Active bowel sounds in all 4 quadrants. No hepatosplenomegaly.   Musculoskeletal: Adequately aligned spine. ROM intact spine and extremities. No joint erythema or tenderness. Normal muscular development. Normal gait.   Extremities: No significant deformity or joint abnormality. Peripheral pulses intact. No varicosities. No peripheral edema, atrophy.   Skin: Intact, no rash. Normal color, texture and turgor with no lesions or eruptions.  Neurological: AOAx4. CN2-12 grosslly intact. Strength and sensation symmetric and intact throughout. Reflexes 2+ throughout. Cerebellar testing normal.  Psychiatry: The mental examination revealed the patient was oriented to person, place, and time. The patient was able to demonstrate good judgement and reason, without hallucinations, abnormal affect or abnormal behaviors during the examination. Patient is not suicidal. Vital Signs Last 24 Hrs  T(C): 36.8 (11 Jul 2018 15:34), Max: 37.6 (11 Jul 2018 12:00)  T(F): 98.2 (11 Jul 2018 15:34), Max: 99.7 (11 Jul 2018 12:00)  HR: 59 (11 Jul 2018 15:34) (59 - 60)  BP: 140/65 (11 Jul 2018 15:34) (118/59 - 140/65)  BP(mean): --  RR: 17 (11 Jul 2018 15:34) (17 - 18)  SpO2: 100% (11 Jul 2018 15:34) (98% - 100%)      PHYSICAL EXAM:  General: Alert and cooperative. Not in acute stress. Well developed, well nourished.   Head: Normocephalic,  Eyes: Decreased vison, clear conjunctiva. EOMI, no ptosis.   Neck: No lymphadenopathy, no masses, no thyromegaly. Carotid pulses 2+. No JVD.   Respiratory: Bilateral lung clear to auscultation, no crackles, no wheezes, no rhonchi.   Cardiovascular: S1/S2 auscultated, no murmur, or gallop. Rhythm is regular. There is no peripheral edema, cyanosis or pallor. Extremities are warm and well perfused.   Abdomen: Soft, non-tender, nondistended, no guarding or rebound tenderness. Active bowel sounds in all 4 quadrants.   Musculoskeletal: Adequately aligned spine. ROM intact spine and extremities. Healing wound on left medial malleolus and small area of necrosis on left toe  Neurological: AOAx4. CN2-12 grosslly intact. Decreased sensation in bilateral feet, decreased strength in bilateral legs Left worse than Right.   Psychiatry: The mental examination revealed the patient was oriented to person, place, and time. The patient was able to demonstrate good judgement and reason, without hallucinations, abnormal affect or abnormal behaviors during the examination.

## 2018-07-11 NOTE — H&P ADULT - PROBLEM SELECTOR PLAN 5
-S/p 1 sent placement in 2010, and 3 stent placements in 2011  -History of MI  -Continue home Aspirin and Plavix

## 2018-07-11 NOTE — CONSULT NOTE ADULT - PROBLEM SELECTOR RECOMMENDATION 9
The patient was found to have an elevated creatinine of 2.2 in the setting of bacteremia being treated with daptomycin and poor PO intake. The patient was found to have an elevated creatinine of 2.2 in the setting of bacteremia being treated with daptomycin and poor PO intake. Recommend continuing IV hydration. Recommend checking for CPK r/u muscle breakdown. The patient was found to have an elevated creatinine of 2.2 in the setting of bacteremia being treated with daptomycin and poor PO intake. His most recent creatinine was 1.3 on 7/2/18. Recommend continuing IV hydration NS 75 CC/hr. Pt. with RICCO, likely hemodynamically mediated in setting of decreased oral intake and likely infection. Scr was 1.3 on labs done on 7/2/18, increased to 2.2 today. Pt. received IVF in ER. Recommend IV NS @ 75 cc/hr. Check CPK level (pt. with 3-day history of coca-colored urine). Consider ID evaluation. Monitor labs and urine output. Avoid any potential nephrotoxins

## 2018-07-11 NOTE — H&P ADULT - PROBLEM SELECTOR PLAN 3
-Continue Atovaquone, Mycophenolic acid and Tacrolimus  -F/U Tacrolimus levels  -Nephrology consulted, appreciate recs

## 2018-07-11 NOTE — H&P ADULT - FAMILY HISTORY
Family history of myocardial infarction     Family history of hyperlipidemia     Family history of hypertension     Sibling  Still living? Unknown  Family history of diabetes mellitus, Age at diagnosis: Age Unknown

## 2018-07-11 NOTE — H&P ADULT - ASSESSMENT
Patient is a 53 year old male with a PMH of CAD s/p stents in 2010/2011 and s/p pacemaker, MI, ESRD s/p renal transplant in 2014, HTN, HLD, CAD, DM, presenting with a three day history of chills, rigors, and night sweats. Likely with sepsis given recent diagnosis of bacteremia; possible sources of infection being his medial malleolus wound, and his picc line. Creatinine also increasing from base line, likely RICCO on CKD; RICCO likely pre-renal azotemia, r/o ATN.

## 2018-07-11 NOTE — CONSULT NOTE ADULT - SUBJECTIVE AND OBJECTIVE BOX
[facility] DIVISION OF KIDNEY DISEASES AND HYPERTENSION -- [phone]  -- INITIAL CONSULT NOTE  --------------------------------------------------------------------------------  HPI: The patient is a 53 year old man with a history of DDRT (2014 on tacrolimus 1.5 mg BID and mycophenolate 360 mg BID), DM2, HTN, CAD s/p PCI, left nephrectomy, subdural hematoma s/p craniotomy (prior to ), seizure disorder, presented to the hospital with 3 days of chills, poor PO intake, and decreased vision. Nephrology was consulted due to elevated creatinine of 2.2 and because of his transplant. His baseline creatinin is aroun 1.3-1.5    The patient was seen and examined at bedside. The patient complains of headache, chills, nausea and "cola" colored urine for the past 3 days. He has had decreased PO intake and has only been drinking a bit of water. 3 weeks ago he was admitted to the hospital in [place] for bacteremia associated with a wound on the medial aspect of his left foot. He was discharged with daptomycin and has been on it for two weeks at home. He has no SOB, chest pain, diarrhea, constipation.         PAST HISTORY  --------------------------------------------------------------------------------  PAST MEDICAL & SURGICAL HISTORY:  Seizure: 1 episode   Hypercholesteremia  Subdural hematoma:  treated with craniotomy/ shunt  Diabetes mellitus  HTN (hypertension)  MI (myocardial infarction)  CAD (coronary artery disease)  Kidney transplanted:  donor   Pacemaker: [employer]  History of ventricular shunt:   History of craniotomy:   H/O unilateral nephrectomy  S/P primary angioplasty with coronary stent: 4 total stents (first stent placed , one year later other 3 stents placed)    FAMILY HISTORY:  Family history of hypertension  Family history of hyperlipidemia  Family history of diabetes mellitus (Sibling)  Family history of myocardial infarction    PAST SOCIAL HISTORY:    ALLERGIES & MEDICATIONS  --------------------------------------------------------------------------------  Allergies  NKDA    Intolerances  dapsone-hemolysis (per outpatient records)  bactrim- resistant hyperkalemia (per outpatient records)    Outpatient medications  Accu-Chek Xiomara Device; USE AS DIRECTED  Aspirin EC 81 MG Oral Tablet Delayed Release; TAKE 1 TABLET DAILY  Atovaquone 750 MG/5ML Oral Suspension; TAKE TWO TEASPOONFULL BY MOUTH  DAILY  Basaglar KwikPen 100 UNIT/ML Subcutaneous Solution Pen-injector; INJECT 42 UNIT  QHS  BD Pen Needle Short U/F 31G X 8 MM Miscellaneous; USE AS DIRECTED. 4 times daily  Bisacodyl EC 5 MG Oral Tablet Delayed Release; TAKE 1 TABLET DAILY AS NEEDED  Blood Glucose Test In Vitro Strip; TEST UP TO 6 TIMES DAILY  Blood Pressure Monitor Miscellaneous; Dispense 1 blood pressure machine  Carvedilol 6.25 MG Oral Tablet; TAKE 1 TABLET TWICE DAILY WITH MEALS  Clopidogrel Bisulfate 75 MG Oral Tablet; TAKE 1 TABLET DAILY  Gabapentin 300 MG Oral Capsule; TAKE 1 CAPSULE AT BEDTIME  Isosorbide Mononitrate ER 30 MG Oral Tablet Extended Release 24 Hour; TAKE ONE  TABLET BY MOUTH DAILY  Lancets Miscellaneous; test glucose up to 6 times daily  Lancets Ultra Fine Miscellaneous; USE AS DIRECTED  LevETIRAcetam 500 MG Oral Tablet; TAKE ONE TABLET BY MOUTH TWICE A DAY  Losartan Potassium 25 MG Oral Tablet; TAKE ONE TABLET BY MOUTH DAILY  Miscellaneous Supply; glucometer with directions use as directed  Mycophenolate Sodium 360 MG Oral Tablet Delayed Release; TAKE 2 TABLETS TWICE  DAILY  NovoLOG FlexPen 100 UNIT/ML Subcutaneous Solution Pen-injector; INJECT 14 UNIT  Before meals  Omega-3-acid Ethyl Esters 1 GM Oral Capsule; TAKE ONE CAPSULE BY MOUTH FOUR  TIMES A DAY  OneTouch Ultra Blue In Vitro Strip; TEST 3 TIMES DAILY  Pantoprazole Sodium 40 MG Oral Tablet Delayed Release; TAKE ONE TABLET BY  MOUTH EVERY DAY  Rosuvastatin Calcium 5 MG Oral Tablet; TAKE 1 TABLET DAILY AT BEDTIME  Tacrolimus 0.5 MG Oral Capsule; Take 1 capsule twice daily  Tacrolimus 1 MG Oral Capsule; TAKE ONE CAPSULE BY MOUTH TWICE A DAY  TRUEplus Pen Needles 32G X 4 MM Miscellaneous  Unifine Pentips Plus 31G X 5 MM Miscellaneous    Standing Inpatient Medications    PRN Inpatient Medications    REVIEW OF SYSTEMS  --------------------------------------------------------------------------------  Gen: chills, subjective fevers  Skin: No rashes  Head/Eyes/Ears: diminished vision Normal hearing   Respiratory: No dyspnea, cough  CV: No chest pain  GI: nausea No abdominal pain, diarrhea, constipation, vomiting  : cola colored urine  MSK: No  edema  Heme: No easy bruising or bleeding  Psych: No significant depression    All other systems were reviewed and are negative, except as noted.    VITALS/PHYSICAL EXAM  --------------------------------------------------------------------------------  T(C): 37.6 (18 @ 12:00), Max: 37.6 (18 @ 12:00)  HR: 60 (18 @ 12:50) (60 - 60)  BP: 118/59 (18 @ 12:50) (118/59 - 133/77)  RR: 18 (18 @ 12:50) (18 - 18)  SpO2: 98% (18 @ 12:50) (98% - 98%)  Wt(kg): --    Physical Exam:  	Gen: NAD  	HEENT: MMM  	Pulm: CTA B/L  	CV: S1S2  	Abd: Soft, +BS   	Ext: AV fistula on left forearm, healing wound on the medial aspect of the left foot, No LE edema B/L  	Neuro: Awake  	Skin: Warm and dry  	Vascular access: peripheral IV on right arm    LABS/STUDIES  --------------------------------------------------------------------------------              13.4   6.01  >-----------<  110      [18 @ 12:00]              43.0     131  |  95  |  30  ----------------------------<  169      [18 @ 12:00]  4.6   |  20  |  2.20        Ca     9.9     [18 @ 12:00]    TPro  7.8  /  Alb  3.7  /  TBili  1.3  /  DBili  x   /  AST  29  /  ALT  23  /  AlkPhos  73  [18 @ 12:00]    Creatinine Trend:  SCr 2.20 [ 12:00]    Urinalysis - [18 @ 12:48]      Color DARK YELLOW / Appearance HAZY / SG 1.014 / pH 6.0      Gluc 50 / Ketone NEGATIVE  / Bili NEGATIVE / Urobili NORMAL       Blood LARGE / Protein 300 / Leuk Est SMALL / Nitrite NEGATIVE      RBC >50 / WBC 25-50 / Hyaline  / Gran  / Sq Epi OCC / Non Sq Epi  / Bacteria     HbA1C 6.9% 18 [facility] DIVISION OF KIDNEY DISEASES AND HYPERTENSION -- [phone]  -- INITIAL CONSULT NOTE  --------------------------------------------------------------------------------  HPI: The patient is a 53 year old man with a history of DDRT (2014 on tacrolimus 1.5 mg BID and mycophenolate 360 mg BID), DM2, HTN, CAD s/p PCI, left nephrectomy, subdural hematoma s/p craniotomy (prior to ), seizure disorder, presented to the hospital with 3 days of chills, poor PO intake, and decreased vision. Nephrology was consulted due to elevated creatinine of 2.2 and because of his transplant. His baseline creatinin is aroun 1.3-1.5.    The patient was seen and examined at bedside. The patient complains of headache, chills, nausea and "cola" colored urine for the past 3 days. He has had decreased PO intake and has only been drinking a bit of water. 3 weeks ago he was admitted to the hospital in [place] for bacteremia associated with a wound on the medial aspect of his left foot. He was discharged with daptomycin and has been on it for two weeks at home with the aid of a visiting nurse. This past week his PICC line was found to be occluded and the visiting nurse removed it. He has no SOB, chest pain, diarrhea, constipation.         PAST HISTORY  --------------------------------------------------------------------------------  PAST MEDICAL & SURGICAL HISTORY:  Seizure: 1 episode   Hypercholesteremia  Subdural hematoma:  treated with craniotomy/ shunt  Diabetes mellitus  HTN (hypertension)  MI (myocardial infarction)  CAD (coronary artery disease)  Kidney transplanted:  donor   Pacemaker: [employer]  History of ventricular shunt:   History of craniotomy:   H/O unilateral nephrectomy  S/P primary angioplasty with coronary stent: 4 total stents (first stent placed , one year later other 3 stents placed)    FAMILY HISTORY:  Family history of hypertension  Family history of hyperlipidemia  Family history of diabetes mellitus (Sibling)  Family history of myocardial infarction    PAST SOCIAL HISTORY:    ALLERGIES & MEDICATIONS  --------------------------------------------------------------------------------  Allergies  NKDA    Intolerances  dapsone-hemolysis (per outpatient records)  bactrim- resistant hyperkalemia (per outpatient records)    Outpatient medications  Accu-Chek Xiomara Device; USE AS DIRECTED  Aspirin EC 81 MG Oral Tablet Delayed Release; TAKE 1 TABLET DAILY  Atovaquone 750 MG/5ML Oral Suspension; TAKE TWO TEASPOONFULL BY MOUTH  DAILY  Basaglar KwikPen 100 UNIT/ML Subcutaneous Solution Pen-injector; INJECT 42 UNIT  QHS  BD Pen Needle Short U/F 31G X 8 MM Miscellaneous; USE AS DIRECTED. 4 times daily  Bisacodyl EC 5 MG Oral Tablet Delayed Release; TAKE 1 TABLET DAILY AS NEEDED  Blood Glucose Test In Vitro Strip; TEST UP TO 6 TIMES DAILY  Blood Pressure Monitor Miscellaneous; Dispense 1 blood pressure machine  Carvedilol 6.25 MG Oral Tablet; TAKE 1 TABLET TWICE DAILY WITH MEALS  Clopidogrel Bisulfate 75 MG Oral Tablet; TAKE 1 TABLET DAILY  Gabapentin 300 MG Oral Capsule; TAKE 1 CAPSULE AT BEDTIME  Isosorbide Mononitrate ER 30 MG Oral Tablet Extended Release 24 Hour; TAKE ONE  TABLET BY MOUTH DAILY  Lancets Miscellaneous; test glucose up to 6 times daily  Lancets Ultra Fine Miscellaneous; USE AS DIRECTED  LevETIRAcetam 500 MG Oral Tablet; TAKE ONE TABLET BY MOUTH TWICE A DAY  Losartan Potassium 25 MG Oral Tablet; TAKE ONE TABLET BY MOUTH DAILY  Miscellaneous Supply; glucometer with directions use as directed  Mycophenolate Sodium 360 MG Oral Tablet Delayed Release; TAKE 2 TABLETS TWICE  DAILY  NovoLOG FlexPen 100 UNIT/ML Subcutaneous Solution Pen-injector; INJECT 14 UNIT  Before meals  Omega-3-acid Ethyl Esters 1 GM Oral Capsule; TAKE ONE CAPSULE BY MOUTH FOUR  TIMES A DAY  OneTouch Ultra Blue In Vitro Strip; TEST 3 TIMES DAILY  Pantoprazole Sodium 40 MG Oral Tablet Delayed Release; TAKE ONE TABLET BY  MOUTH EVERY DAY  Rosuvastatin Calcium 5 MG Oral Tablet; TAKE 1 TABLET DAILY AT BEDTIME  Tacrolimus 0.5 MG Oral Capsule; Take 1 capsule twice daily  Tacrolimus 1 MG Oral Capsule; TAKE ONE CAPSULE BY MOUTH TWICE A DAY  TRUEplus Pen Needles 32G X 4 MM Miscellaneous  Unifine Pentips Plus 31G X 5 MM Miscellaneous    Standing Inpatient Medications    PRN Inpatient Medications    REVIEW OF SYSTEMS  --------------------------------------------------------------------------------  Gen: chills, subjective fevers  Skin: No rashes  Head/Eyes/Ears: diminished vision Normal hearing   Respiratory: No dyspnea, cough  CV: No chest pain  GI: nausea No abdominal pain, diarrhea, constipation, vomiting  : cola colored urine  MSK: No  edema, no muscle pains  Heme: No easy bruising or bleeding  Psych: No significant depression    All other systems were reviewed and are negative, except as noted.    VITALS/PHYSICAL EXAM  --------------------------------------------------------------------------------  T(C): 37.6 (18 @ 12:00), Max: 37.6 (18 @ 12:00)  HR: 60 (18 @ 12:50) (60 - 60)  BP: 118/59 (18 @ 12:50) (118/59 - 133/77)  RR: 18 (18 @ 12:50) (18 - 18)  SpO2: 98% (18 @ 12:50) (98% - 98%)  Wt(kg): --    Physical Exam:  	Gen: NAD  	HEENT: MMM  	Pulm: CTA B/L  	CV: S1S2  	Abd: Soft, +BS   	Ext: AV fistula on left forearm, healing wound on the medial aspect of the left foot, No LE edema B/L  	Neuro: Awake  	Skin: Warm and dry  	Vascular access: peripheral IV on right arm    LABS/STUDIES  --------------------------------------------------------------------------------              13.4   6.01  >-----------<  110      [18 @ 12:00]              43.0     131  |  95  |  30  ----------------------------<  169      [18 12:00]  4.6   |  20  |  2.20        Ca     9.9     [18 12:00]    TPro  7.8  /  Alb  3.7  /  TBili  1.3  /  DBili  x   /  AST  29  /  ALT  23  /  AlkPhos  73  [18 12:00]    Creatinine Trend:  SCr 2.20 [:00]    Urinalysis - [18 @ 12:48]      Color DARK YELLOW / Appearance HAZY / SG 1.014 / pH 6.0      Gluc 50 / Ketone NEGATIVE  / Bili NEGATIVE / Urobili NORMAL       Blood LARGE / Protein 300 / Leuk Est SMALL / Nitrite NEGATIVE      RBC >50 / WBC 25-50 / Hyaline  / Gran  / Sq Epi OCC / Non Sq Epi  / Bacteria     HbA1C 6.9% 18 [facility] DIVISION OF KIDNEY DISEASES AND HYPERTENSION -- [phone]  -- INITIAL CONSULT NOTE  --------------------------------------------------------------------------------  HPI: 53 year old man with a history of DDRT (2014 on tacrolimus 1.5 mg BID and mycophenolate 360 mg BID), DM2, HTN, CAD s/p PCI, left nephrectomy, subdural hematoma s/p craniotomy (prior to ), seizure disorder, presented to the hospital with 3 days of chills, poor PO intake, and decreased vision. Pt. noted to have elevated Scr of 2.2 on labs in ER. His baseline Scr ranges from 1.3-1.5.    Patient was seen and examined in ER. Patient gives history of headache, chills, nausea and "cola" colored urine for the past 3 days. He has had decreased PO intake and has only been drinking some water. 3 weeks ago he was admitted to the hospital in [place] for bacteremia associated with a wound on the medial aspect of his left foot. He was discharged with IV Daptomycin and has been on it for two weeks at home with the assistance of a visiting nurse. This past week his PICC line was found to be occluded and the visiting nurse removed it. Pt. says that he saw his transplant nephrologist Dr. Miller on 18. Scr was 1.3 on labs done on 18. He has no SOB, chest pain, diarrhea, constipation.     PAST HISTORY  --------------------------------------------------------------------------------  PAST MEDICAL & SURGICAL HISTORY:  Seizure: 1 episode   Hypercholesteremia  Subdural hematoma:  treated with craniotomy/ shunt  Diabetes mellitus  HTN (hypertension)  MI (myocardial infarction)  CAD (coronary artery disease)  Kidney transplanted:  donor   Pacemaker: [employer]  History of ventricular shunt:   History of craniotomy:   H/O unilateral nephrectomy  S/P primary angioplasty with coronary stent: 4 total stents (first stent placed , one year later other 3 stents placed)    FAMILY HISTORY:  Family history of hypertension  Family history of hyperlipidemia  Family history of diabetes mellitus (Sibling)  Family history of myocardial infarction    PAST SOCIAL HISTORY:    ALLERGIES & MEDICATIONS  --------------------------------------------------------------------------------  Allergies  NKDA    Intolerances  dapsone-hemolysis (per outpatient records)  bactrim- resistant hyperkalemia (per outpatient records)    Outpatient medications  Accu-Chek Xiomara Device; USE AS DIRECTED  Aspirin EC 81 MG Oral Tablet Delayed Release; TAKE 1 TABLET DAILY  Atovaquone 750 MG/5ML Oral Suspension; TAKE TWO TEASPOONFULL BY MOUTH  DAILY  Basaglar KwikPen 100 UNIT/ML Subcutaneous Solution Pen-injector; INJECT 42 UNIT  QHS  BD Pen Needle Short U/F 31G X 8 MM Miscellaneous; USE AS DIRECTED. 4 times daily  Bisacodyl EC 5 MG Oral Tablet Delayed Release; TAKE 1 TABLET DAILY AS NEEDED  Blood Glucose Test In Vitro Strip; TEST UP TO 6 TIMES DAILY  Blood Pressure Monitor Miscellaneous; Dispense 1 blood pressure machine  Carvedilol 6.25 MG Oral Tablet; TAKE 1 TABLET TWICE DAILY WITH MEALS  Clopidogrel Bisulfate 75 MG Oral Tablet; TAKE 1 TABLET DAILY  Gabapentin 300 MG Oral Capsule; TAKE 1 CAPSULE AT BEDTIME  Isosorbide Mononitrate ER 30 MG Oral Tablet Extended Release 24 Hour; TAKE ONE  TABLET BY MOUTH DAILY  Lancets Miscellaneous; test glucose up to 6 times daily  Lancets Ultra Fine Miscellaneous; USE AS DIRECTED  LevETIRAcetam 500 MG Oral Tablet; TAKE ONE TABLET BY MOUTH TWICE A DAY  Losartan Potassium 25 MG Oral Tablet; TAKE ONE TABLET BY MOUTH DAILY  Miscellaneous Supply; glucometer with directions use as directed  Mycophenolate Sodium 360 MG Oral Tablet Delayed Release; TAKE 2 TABLETS TWICE  DAILY  NovoLOG FlexPen 100 UNIT/ML Subcutaneous Solution Pen-injector; INJECT 14 UNIT  Before meals  Omega-3-acid Ethyl Esters 1 GM Oral Capsule; TAKE ONE CAPSULE BY MOUTH FOUR  TIMES A DAY  OneTouch Ultra Blue In Vitro Strip; TEST 3 TIMES DAILY  Pantoprazole Sodium 40 MG Oral Tablet Delayed Release; TAKE ONE TABLET BY  MOUTH EVERY DAY  Rosuvastatin Calcium 5 MG Oral Tablet; TAKE 1 TABLET DAILY AT BEDTIME  Tacrolimus 0.5 MG Oral Capsule; Take 1 capsule twice daily  Tacrolimus 1 MG Oral Capsule; TAKE ONE CAPSULE BY MOUTH TWICE A DAY  TRUEplus Pen Needles 32G X 4 MM Miscellaneous  Unifine Pentips Plus 31G X 5 MM Miscellaneous    Standing Inpatient Medications    PRN Inpatient Medications    REVIEW OF SYSTEMS (See HPI)  --------------------------------------------------------------------------------  Gen: See HPI, chills, subjective fevers  Skin: No rashes  Head/Eyes/Ears: diminished vision+   Respiratory: No dyspnea, cough  CV: No chest pain  GI: nausea+, no abdominal pain, diarrhea, constipation, vomiting  : cola colored urine  MSK: No  edema, no muscle pains  Heme: No easy bruising or bleeding  Psych: No significant depression    All other systems were reviewed and are negative, except as noted.    VITALS/PHYSICAL EXAM  --------------------------------------------------------------------------------  T(C): 37.6 (18 @ 12:00), Max: 37.6 (18 @ 12:00)  HR: 60 (18 @ 12:50) (60 - 60)  BP: 118/59 (18 @ 12:50) (118/59 - 133/77)  RR: 18 (18 @ 12:50) (18 - 18)  SpO2: 98% (18 @ 12:50) (98% - 98%)  Wt(kg): --    Physical Exam:  	Gen: resting, NAD  	HEENT: No JVD  	Pulm: CTA B/L  	CV: S1S2+  	Abd: Soft, +BS   	Ext: LUE AVF: +thrill, Healing wound on the medial aspect of the left foot+, No LE edema B/L  	Neuro: Awake  	Skin: Warm and dry  	Vascular access: peripheral IV on right arm    LABS/STUDIES  --------------------------------------------------------------------------------              13.4   6.01  >-----------<  110      [18 @ 12:00]              43.0     131  |  95  |  30  ----------------------------<  169      [18 @ 12:00]  4.6   |  20  |  2.20        Ca     9.9     [18 12:00]    TPro  7.8  /  Alb  3.7  /  TBili  1.3  /  DBili  x   /  AST  29  /  ALT  23  /  AlkPhos  73  [18 @ 12:00]    Creatinine Trend:  SCr 2.20 [ 12:00]    Urinalysis - [18 @ 12:48]      Color DARK YELLOW / Appearance HAZY / SG 1.014 / pH 6.0      Gluc 50 / Ketone NEGATIVE  / Bili NEGATIVE / Urobili NORMAL       Blood LARGE / Protein 300 / Leuk Est SMALL / Nitrite NEGATIVE      RBC >50 / WBC 25-50 / Hyaline  / Gran  / Sq Epi OCC / Non Sq Epi  / Bacteria

## 2018-07-11 NOTE — H&P ADULT - PROBLEM SELECTOR PLAN 1
-Possible sources of infection include medial malleolus wound and picc line  -Started on vanco in the ED; continue. Start on cefepime; both renally dosed   -UA not concerning for a UTI  -Osteomyelitis unlikely given negative X-ray   -F/U Blood and Urine cultures -Possible sources of infection include medial malleolus wound and picc line  -Started on vanco in the ED; continue. Start on cefepime; both renally dosed   -UA not concerning for a UTI  -Osteomyelitis unlikely given negative X-ray   -F/U Blood and Urine cultures  -Podiatry consult for foot wounds

## 2018-07-11 NOTE — ED ADULT TRIAGE NOTE - CHIEF COMPLAINT QUOTE
Pt on home antibiotics for MRSA and states that he feels cold and nauseous when he infuses medication through right PICC line for the last 2 days.  PMH renal transplant, HTN DM, PPM, left FA AVF

## 2018-07-11 NOTE — ED PROVIDER NOTE - ATTENDING CONTRIBUTION TO CARE
AJM: Patient seen with PA and agree with above note. Pt is 52 y/o male with Pmx of DM, HTN, PPM, CAD, HLD, ESRD, s/p renal transplant in 2014 (unclear if pt is taking immunosuppressants) here for eval of rigors, subjective fever and "not feeling well" x 2 days. Pt was recently diagnosed with MRSA bacteremia while in [place], hospitalized there, discharged with PICC line on daptomycin - as per pt he has been administering vancomycin by himself (it is mailed to him). Pt states that he has been "shaking really bad";  (-) cp, sob, HA, neck pain/stiffness, denies cough, urinary sx, pt is not able to provide the information about the source of bacteremia (diabetic ulcer?). Pt notes night sweats all night. Denies issued with urination or pain at transplant site. 1 episode of vomiting. On CXR, PICC is not in position. Will remove PICC and send for cx. also with new RICCO. Renal has been consulted.

## 2018-07-11 NOTE — PATIENT PROFILE ADULT. - [facility]
TIME RECORD    2018    Start Time:  1510   Stop Time:  1600    PROCEDURES:    TIMED  Procedure Time Min.    Start:  Stop:     Start:  Stop:     Start:  Stop:     Start:  Stop:          UNTIMED  Procedure Time Min.   Evaluation Start:  Stop:     Start:  Stop:      Total Timed Minutes:  0  Total Timed Units:  0  Total Untimed Units:  1  Charges Billed/# of units:  1    OUTPATIENT PHYSICAL THERAPY   PATIENT EVALUATION  Onset Date:   Problem List Items Addressed This Visit     Gait abnormality        Treatment Diagnosis: Gait abnormality    Past Medical History:   Diagnosis Date    Diabetes mellitus     Hepatitis B     Hypertension        Past Surgical History:   Procedure Laterality Date     SECTION      left knee scope         has a current medication list which includes the following prescription(s): acyclovir, atorvastatin, ibuprofen, lisinopril, metformin, multivitamin, and oxycodone-acetaminophen.    Precautions: HTN, DM  Surgical history: Right knee arthroscopic partial medial meniscectomy 18,  Left knee partial medial meniscectomy, partial synovectomy with plica excision 18  Prior Therapy: yes  History of Present Illness: Pt presenting to therapy s/p right knee arthroscopic partial medial meniscectomy on 18. She previously had left knee partial medial meniscectomy, partial synovectomy with plica excision on 18 with 4 weeks of PT afterward. Surgeon is referring pt for therapy now to address both knees.  Prior Level of Function: Independent  Social History:        Living environment: ground level apartment              Employment:  in [employer]'s office              Transportation: drives    Current level of function: Independent  Functional Deficits Leading to Referral/Nature of Injury: weakness, impaired endurance, impaired functional mobility, impaired balance, decreased lower extremity function, pain, decreased ROM and edema  Patient Therapy  "Goals: "I would like to have no more pain."      Subjective     Rupali Marcy Velasquez states her right knee doesn't hurt as much now as it did pre-operatively. Not able to ice her knee every day due to not having a freezer.     Pain:  Location:  Anterior right knee  Description: sharp, sore, aching  Activities Which Increase Pain: walking, turning  Activities Which Decrease Pain: rest  Pain Scale: 2/10 at best 5/10 now at rest, 7/10 walking in to therapy today 8/10 at worst      Objective     Posture/Appearance: Mild dec'd weight shift RLE, mildswelling notable anterior right knee, circumerential measurement at mid patella R=42 cm L=40 cm  Palpation: TTP right medial/lateral knee, patellar tendon. Slight tenderness medial/lateral left knee  Sensation: WFL  DTRs:  NT  Range of Motion/Strength:    Lower Extremity Range of Motion:  Right Lower Extremity: knee 0-114*    Left Lower Extremity: knee 0-118*    Lower Extremity Strength:  Right Lower Extremity: hip flex 4+/5, knee ext 4/5, knee flex 4+/5, ankle DF 4+/5  Left Lower Extremity: hip flex 5/5, knee ext 4+/5, knee flex 4+/5, ankle DF 5/5        Flexibility: ROM as per above, min tightness bilateral gastrocs, hamstrings  Gait: antalgic, especially upon first standing up  Bed Mobility: Supine<>sit independent  Transfers: Sit<>stand independent  Functional Outcome Measure: Lower Extremity Functional Scale (LEFS): 46/80=43% impairment  Treatment: Educated on role/goal PT, POC. Instructed in QS and long sitting calf stretches for HEP, written/pictorial HEP issued. Pt verbalizing understanding and agreement.     Assessment       Initial Assessment (Pertinent finding, problem list and factors affecting outcome): Patient presents to PT with c/o right knee pain s/p arthroscopic partial medial menisectomy on 04/26/18. Notable impairments include weakness, impaired endurance, impaired functional mobility, decreased lower extremity function, pain, decreased ROM and edema, and " impaired functional mobility. Patient would benefit from skilled PT to address notable impairments and improve functional mobility to PLOF.      Rehab Potiential: good      Short Term Goals (3 Weeks): 1) Pt will initiate HEP 2) Patient will improve strength 1/2 muscle grade RLE   Long Term Goals (6 Weeks): 1) Pt will be I with HEP 2) Pt will return to community ambulation with strength 5/5 3) Pt will improve LEFS to <30% impairment 4) Pt will ambulate with non-antalgic pattern    Plan     Certification Period: 05/31/2018 to 07/20/18  Recommended Treatment Plan: 1-2 times per week for 6 weeks: Electrical Stimulation PRN, Gait Training, Group Therapy, Manual Therapy, Moist Heat/ Ice, Neuromuscular Re-ed, Patient Education, Therapeutic Activites and Therapeutic Exercise      Thank you for referral.    Therapist: Lena Branch, PT    I CERTIFY THE NEED FOR THESE SERVICES FURNISHED UNDER THIS PLAN OF TREATMENT AND WHILE UNDER MY CARE    Physician's comments: ________________________________________________________________________________________________________________________________________________      Physician's Name: ___________________________________     (3) slightly limited

## 2018-07-11 NOTE — H&P ADULT - NSHPLABSRESULTS_GEN_ALL_CORE
LABS:                        13.4   6.01  )-----------( 110      ( 2018 12:00 )             43.0     Hgb Trend: 13.4<--      131<L>  |  95<L>  |  30<H>  ----------------------------<  169<H>  4.6   |  20<L>  |  2.20<H>    Ca    9.9      2018 12:00    TPro  7.8  /  Alb  3.7  /  TBili  1.3<H>  /  DBili  x   /  AST  29  /  ALT  23  /  AlkPhos  73  07    Creatinine Trend: 2.20<--    Urinalysis Basic - ( 2018 12:48 )    Color: DARK YELLOW / Appearance: HAZY / S.014 / pH: 6.0  Gluc: 50 / Ketone: NEGATIVE  / Bili: NEGATIVE / Urobili: NORMAL mg/dL   Blood: LARGE / Protein: 300 mg/dL / Nitrite: NEGATIVE   Leuk Esterase: SMALL / RBC: >50 / WBC 25-50   Sq Epi: OCC / Non Sq Epi: x / Bacteria: x        Venous Blood Gas:   @ 14:00  7.38/36/27/21/45.5  VBG Lactate: 1.1  Venous Blood Gas:   @ 12:00  7.38/39/< 24/22/23.5  VBG Lactate: 2.5      MICROBIOLOGY:     RADIOLOGY:  [ ] Reviewed and interpreted by me    EKG: Paced rhythm, Left axis deviation

## 2018-07-11 NOTE — H&P ADULT - PROBLEM SELECTOR PLAN 2
-RICCO on CKD, consider prerenal vs ATN  -IV Fluids NS -RICCO on CKD, consider prerenal vs ATN  -monitor

## 2018-07-11 NOTE — ED ADULT NURSE NOTE - PMH
CAD (coronary artery disease)    Diabetes mellitus    HTN (hypertension)    Hypercholesteremia    MI (myocardial infarction)    Seizure  1 episode 2012  Subdural hematoma  2012 treated with craniotomy/ shunt

## 2018-07-11 NOTE — H&P ADULT - HISTORY OF PRESENT ILLNESS
CHIEF COMPLAINT:    Interval Events: No acute event overnight. Patient is seen and examined at the bedside this morning.     REVIEW OF SYSTEMS:  Constitutional: No fever, or chills. No recent weight loss or weight gain.   HEENT: No dry eyes or eye irritation. No postnasal drip or nasal congestion.  CV: No chest pain, or palpitations. No orthopnea.   Resp: No cough, or sputum production. No shortness of breath or dyspnea on exertion.   GI: No nausea or vomiting. No diarrhea or constipation. No abdominal pain.   : No dysuria, no nocturia or increased urinary frequency.  Musculoskeletal: No back pain, no myalgias  Skin: No rash or itchiness.   Neurological: No headache or dizziness. No syncope, no weakness, numbness.  Psychiatric: Denies depressed mood.   Endocrine: No cold or heat intolerance. No dry skin.  Hematologic/Lymphatic: No anemia or bleeding problem.     OBJECTIVE:  Vital Signs Last 24 Hrs  T(C): 36.8 (11 Jul 2018 15:34), Max: 37.6 (11 Jul 2018 12:00)  T(F): 98.2 (11 Jul 2018 15:34), Max: 99.7 (11 Jul 2018 12:00)  HR: 59 (11 Jul 2018 15:34) (59 - 60)  BP: 140/65 (11 Jul 2018 15:34) (118/59 - 140/65)  BP(mean): --  RR: 17 (11 Jul 2018 15:34) (17 - 18)  SpO2: 100% (11 Jul 2018 15:34) (98% - 100%)    CAPILLARY BLOOD GLUCOSE      POCT Blood Glucose.: 186 mg/dL (11 Jul 2018 11:32)      PHYSICAL EXAM:  General: Alert and cooperative. Not in acute stress. Well developed, well nourished.   Head: Normocephalic, no mass and lesions.  Eyes: Intact visual fields. PERRLA, clear conjunctiva. EOMI, no ptosis.   Throat: Oral cavity and pharynx normal. No inflammation, swelling, exudate, or lesions. Teeth and gingiva in good general condition.  Neck: No lymphadenopathy, no masses, no thyromegaly. Carotid pulses 2+. No JVD.   Respiratory: Bilateral lung clear to auscultation, no crackles, no wheezes, no rhonchi.   Cardiovascular: S1/S2 auscultated, no murmur, or gallop. Rhythm is regular. There is no peripheral edema, cyanosis or pallor. Extremities are warm and well perfused. Capillary refill is less than 2 seconds. No carotid bruits.  Abdomen: Soft, non-tender, nondistended, no guarding or rebound tenderness. Active bowel sounds in all 4 quadrants. No hepatosplenomegaly.   Musculoskeletal: Adequately aligned spine. ROM intact spine and extremities. No joint erythema or tenderness. Normal muscular development. Normal gait.   Extremities: No significant deformity or joint abnormality. Peripheral pulses intact. No varicosities. No peripheral edema, atrophy.   Skin: Intact, no rash. Normal color, texture and turgor with no lesions or eruptions.  Neurological: AOAx4. CN2-12 grosslly intact. Strength and sensation symmetric and intact throughout. Reflexes 2+ throughout. Cerebellar testing normal.  Psychiatry: The mental examination revealed the patient was oriented to person, place, and time. The patient was able to demonstrate good judgement and reason, without hallucinations, abnormal affect or abnormal behaviors during the examination. Patient is not suicidal. Patient is a Patient is a 53 year old male with a PMH of CAD s/p stents in 2010/2011 and s/p pacemaker, MI, ESRD s/p renal transplant in 2014, HTN, HLD, CAD, DM, presenting with a three day history of chills, rigors, and night sweats. About three weeks ago patient was hospitalized at [facility] in [place] after experiencing leg weakness while driving, and discharged home on Daptomycin 500mg IV daily via Picc line for treatment of MRSA  bacteremia. Source of infection thought to be a wound on his medial left foot received while mowing his lawn. He was tolerating his IV medication well, until Saturday when he received a new shipment from his pharmacy and upon his first infusion noticed that he felt sleepy/drowsy, as if he were drunk. He had no issues with the medication on Sunday, but noticed that about 20 minutes after receiving his dose on Monday he started experiencing extreme rigors, chills, which lasted for about 4 hours. He had a similar experience on Tuesday again, this time lasting for about 2hrs. He also reported noticing a foul smell from his picc line (now removed). Denied headache, lightheadedness, chest pain, shortness of breath, dysuria, back pain, abdominal pain , diarrhea or constipation. He endorsed some increased urinary frequency.     ED Course: Patient is a 53 year old male with a PMH of CAD s/p stents in 2010/2011 and s/p pacemaker, MI, ESRD s/p renal transplant in 2014, HTN, HLD, CAD, DM, presenting with a three day history of chills, rigors, and night sweats. About three weeks ago patient was hospitalized at [facility] in [place] after experiencing leg weakness, and discharged home on Daptomycin 500mg IV daily via Picc line for treatment of MRSA  bacteremia. Source of infection thought to be a wound on his medial left foot received while mowing his lawn. He was tolerating his IV medication well, until Saturday when he received a new shipment from his pharmacy and upon his first infusion noticed that he felt sleepy/drowsy, as if he were drunk. He had no issues with the medication on Sunday, but noticed that about 20 minutes after receiving his dose on Monday he started experiencing extreme rigors, chills, which lasted for about 4 hours. He had a similar experience on Tuesday again, this time lasting for about 2hrs. He stated that the dose he received might be higher than that which was prescribed, but he is unsure He also reported noticing a foul smell from his picc line (now removed). Denied headache, lightheadedness, chest pain, shortness of breath, dysuria, back pain, abdominal pain , diarrhea or constipation. He endorsed some increased urinary frequency. Also endorsed decreased PO intake.    ED Course:  Patient was afebrile in the ED, Tmax 98.2, HR 60, SERGEY 133/77, RR 18, O2 sat 98% on RA UA, CXR, left ankle x-ray were obtained. No leukocytosis. Hyponatremic. Started on NS and Vancomycin.

## 2018-07-11 NOTE — ED ADULT NURSE NOTE - PSH
H/O unilateral nephrectomy    History of craniotomy  2012  History of ventricular shunt  2012  Kidney transplanted   donor 2014  Pacemaker  [facility]  S/P primary angioplasty with coronary stent  4 total stents (first stent placed , one year later other 3 stents placed)

## 2018-07-11 NOTE — ED PROVIDER NOTE - PHYSICAL EXAMINATION
(+) rigoring  Lt foot/ankle - small area of necrosis to 3rd toe, also necrotic area over medial malleolus  2+ DP

## 2018-07-11 NOTE — CONSULT NOTE ADULT - PROBLEM SELECTOR RECOMMENDATION 3
The patient has chills and subjective fevers in the setting of a wound infection and occluded PICC line after about 2 weeks of daptomycin. Recommend ID consut.

## 2018-07-11 NOTE — CONSULT NOTE ADULT - ASSESSMENT
53 year old man with a history of DDRT, CAD, DM2, HTN, siezure presents to the hospital with chills, decreased vision, headache. He was found to have worsening renal function compared to baseline. 53 year old man with a history of DDRT (2014), CAD, DM2, HTN, siezure presents to the hospital with chills, decreased vision, headache. Pt. found to have RICCO.

## 2018-07-11 NOTE — ED ADULT NURSE NOTE - OBJECTIVE STATEMENT
Pt received in exam room 7. Alert and oriented x3, ambulatory. Hx of bacteremia. Receiving IV antibiotics at home. Has PICC line to right arm. Pt has rigors in exam room. Rectal temp 99.5. Skin intact. Left fistula Pt received in exam room 7. Alert and oriented x3, ambulatory. Hx of bacteremia. Receiving IV antibiotics at home x 3 weeks due to infected diabetic ulcer to leg which has healed. Has PICC line to right arm. Pt has rigors in exam room. Rectal temp 99.7. Skin intact. Left fistula. Pt w hx of kidney transplant no longer on dialysis. BECCA ma performed. Pt received in exam room 7. Alert and oriented x3, ambulatory. c/o rigors x 2 days.  Hx of bacteremia. Receiving IV antibiotics at home x 3 weeks due to infected diabetic ulcer to leg which has healed. Has PICC line to right arm. Pt has rigors in exam room. Rectal temp 99.7. Skin intact. Left fistula. Pt w hx of kidney transplant no longer on dialysis. PA eval performed.

## 2018-07-11 NOTE — ED PROVIDER NOTE - OBJECTIVE STATEMENT
Pt is 52 y/o male with Pmx of DM, HTN, PPM, CAD, HLD, ESRD, s/p renal transplant in 2014 (unclear if pt is taking immunosuppressants) here for eval of rigors, subjective fever and "not feeling well" x 2 days. Pt was recently diagnosed with MRSA bacteremia while in [place], hospitalized there, discharged with PICC line on daptomycin - as per pt he has been administering vancomycin by himself (it is mailed to him). Pt states that he has been "shaking really bad";  (-) cp, sob, HA, neck pain/stiffness, denies cough, urinary sx, pt is not able to provide the information about the source of bacteremia (diabetic ulcer?). renal - Dr ogden; Pt is extremely poor historian. Pt is 54 y/o male with Pmx of DM, HTN, PPM, CAD, HLD, ESRD, s/p renal transplant in 2014 (unclear if pt is taking immunosuppressants) here for eval of rigors, subjective fever and "not feeling well" x 2 days. Pt was recently diagnosed with MRSA bacteremia while in [place], hospitalized there, discharged with PICC line on daptomycin - as per pt he has been administering vancomycin by himself (it is mailed to him). Pt states that he has been "shaking really bad";  (-) cp, sob, HA, neck pain/stiffness, denies cough, urinary sx, pt is not able to provide the information about the source of bacteremia (diabetic ulcer?). renal - Dr ogden; Pt is poor historian.

## 2018-07-11 NOTE — CONSULT NOTE ADULT - PROBLEM SELECTOR RECOMMENDATION 2
"  SUBJECTIVE:                                                    Armando Hines is a 31 year old male who presents to clinic today for the following health issues:    Hand Pain    Onset: happened yesterday at the gym, punched a punching bag     Description:   Location: right hand down to wrist   Character: Sharp and Dull ache    Intensity: moderate, severe with movement     Progression of Symptoms: worst    Accompanying Signs & Symptoms:  Other symptoms: radiation of pain to wrist, tingling of finger tips , weakness of right hand: limited ROM  and redness    History:   Previous similar pain: no       Precipitating factors:   Trauma or overuse: YES- punched punching bag     Alleviating factors:  Improved by: nothing    Therapies Tried and outcome: ice       Problem list and histories reviewed & adjusted, as indicated.      OBJECTIVE:     /70  Pulse 82  Temp 98.7  F (37.1  C) (Oral)  Ht 5' 8.5\" (1.74 m)  Wt 183 lb (83 kg)  SpO2 96%  BMI 27.42 kg/m2  Body mass index is 27.42 kg/(m^2).  GEN: no distress  SKIN: No rashes  M/S: mild edema of the medial right hand, near the 4th and 5th metacarpals. Discomfort w/ palpation. Has FROM of fingers.     Recent Results (from the past 744 hour(s))   XR Hand Right G/E 3 Views    Narrative    XR HAND RT G/E 3 VW  8/2/2017 10:15 AM    HISTORY:  Pain in right hand    COMPARISON:  None.      Impression    IMPRESSION:  Negative.     JOYCE YANG MD         ASSESSMENT/PLAN:       ICD-10-CM    1. Pain of right hand M79.641 XR Hand Right G/E 3 Views     Likely soft tissue injury vs possible bone contusion.    ACE wrap applied.  Discussed RICE.  Call if sx do not improve over the next few days.     Fredrick Cunningham MD  [facility]  " The patient had a DDRT in 2014 and is on tacrolimus 1.5 mg BID and mycophenolate 360 mg BID. Recommend continuing home dose of tacrolimus and mycophenolate and obtain tacrolimus level in the AM. The patient had a DDRT in 2014 and is on tacrolimus 1.5 mg BID and mycophenolate 360 mg BID. Recommend continuing home dose of tacrolimus and mycophenolate and obtain tacrolimus (12 hour trough) level in the AM

## 2018-07-11 NOTE — ED PROVIDER NOTE - MEDICAL DECISION MAKING DETAILS
subjective fever, rigors, hx of recent MRSA bacteremia, on dapto via picc - labs, CXR, broad abx coverage will admit.

## 2018-07-11 NOTE — CONSULT NOTE ADULT - PROBLEM SELECTOR RECOMMENDATION 4
The patient has had dark colored urin for 3 days in the setting of decreased PO intake and bacteremia. Recommend getting a CPK level to rule out muscle breakdown.

## 2018-07-11 NOTE — H&P ADULT - NSHPREVIEWOFSYSTEMS_GEN_ALL_CORE
Constitutional: +Chills, +rigors, +drowsyness, +generalized weakness. No fever, no  CV: No chest pain, or palpitations. No orthopnea.   Resp: No cough, or sputum production. No shortness of breath or dyspnea on exertion.   GI: +nausea, +vomiting. No diarrhea or constipation. No abdominal pain.   : No dysuria, no nocturia, +increased urinary frequency.  Musculoskeletal: No back pain, no myalgias  Skin: No rash or itchiness, left foot  Neurological: No headache or dizziness. No syncope, no weakness, numbness.  Psychiatric: Denies depressed mood. Constitutional: +Chills, +rigors, +drowsiness, +generalized weakness. No fever, no  CV: No chest pain, or palpitations. No orthopnea.   Resp: No cough, or sputum production. No shortness of breath or dyspnea on exertion.   GI: +nausea, +vomiting. No diarrhea or constipation. No abdominal pain.   : No dysuria, no nocturia, +increased urinary frequency.  Musculoskeletal: No back pain, no myalgias  Skin: No rash or itchiness, left foot  Neurological: No headache or dizziness. No syncope, no weakness, numbness.  Psychiatric: Denies depressed mood.

## 2018-07-12 LAB
ALBUMIN SERPL ELPH-MCNC: 3.3 G/DL — SIGNIFICANT CHANGE UP (ref 3.3–5)
ALP SERPL-CCNC: 59 U/L — SIGNIFICANT CHANGE UP (ref 40–120)
ALT FLD-CCNC: 15 U/L — SIGNIFICANT CHANGE UP (ref 4–41)
AST SERPL-CCNC: 19 U/L — SIGNIFICANT CHANGE UP (ref 4–40)
BACTERIA UR CULT: SIGNIFICANT CHANGE UP
BASOPHILS # BLD AUTO: 0.03 K/UL — SIGNIFICANT CHANGE UP (ref 0–0.2)
BASOPHILS NFR BLD AUTO: 0.4 % — SIGNIFICANT CHANGE UP (ref 0–2)
BASOPHILS NFR SPEC: 0 % — SIGNIFICANT CHANGE UP (ref 0–2)
BILIRUB SERPL-MCNC: 0.7 MG/DL — SIGNIFICANT CHANGE UP (ref 0.2–1.2)
BLASTS # FLD: 0 % — SIGNIFICANT CHANGE UP (ref 0–0)
BUN SERPL-MCNC: 26 MG/DL — HIGH (ref 7–23)
BURR CELLS BLD QL SMEAR: PRESENT — SIGNIFICANT CHANGE UP
CALCIUM SERPL-MCNC: 9.5 MG/DL — SIGNIFICANT CHANGE UP (ref 8.4–10.5)
CHLORIDE SERPL-SCNC: 100 MMOL/L — SIGNIFICANT CHANGE UP (ref 98–107)
CO2 SERPL-SCNC: 20 MMOL/L — LOW (ref 22–31)
CREAT SERPL-MCNC: 2.14 MG/DL — HIGH (ref 0.5–1.3)
EOSINOPHIL # BLD AUTO: 0.41 K/UL — SIGNIFICANT CHANGE UP (ref 0–0.5)
EOSINOPHIL NFR BLD AUTO: 6.1 % — HIGH (ref 0–6)
EOSINOPHIL NFR FLD: 10.6 % — HIGH (ref 0–6)
GIANT PLATELETS BLD QL SMEAR: PRESENT — SIGNIFICANT CHANGE UP
GLUCOSE SERPL-MCNC: 197 MG/DL — HIGH (ref 70–99)
HBA1C BLD-MCNC: 6.8 % — HIGH (ref 4–5.6)
HCT VFR BLD CALC: 38.4 % — LOW (ref 39–50)
HGB BLD-MCNC: 12.2 G/DL — LOW (ref 13–17)
IMM GRANULOCYTES # BLD AUTO: 0.02 # — SIGNIFICANT CHANGE UP
IMM GRANULOCYTES NFR BLD AUTO: 0.3 % — SIGNIFICANT CHANGE UP (ref 0–1.5)
LYMPHOCYTES # BLD AUTO: 1.44 K/UL — SIGNIFICANT CHANGE UP (ref 1–3.3)
LYMPHOCYTES # BLD AUTO: 21.3 % — SIGNIFICANT CHANGE UP (ref 13–44)
LYMPHOCYTES NFR SPEC AUTO: 9.7 % — LOW (ref 13–44)
MACROCYTES BLD QL: SLIGHT — SIGNIFICANT CHANGE UP
MAGNESIUM SERPL-MCNC: 1.8 MG/DL — SIGNIFICANT CHANGE UP (ref 1.6–2.6)
MCHC RBC-ENTMCNC: 26 PG — LOW (ref 27–34)
MCHC RBC-ENTMCNC: 31.8 % — LOW (ref 32–36)
MCV RBC AUTO: 81.7 FL — SIGNIFICANT CHANGE UP (ref 80–100)
METAMYELOCYTES # FLD: 0.9 % — SIGNIFICANT CHANGE UP (ref 0–1)
METHOD TYPE: SIGNIFICANT CHANGE UP
MICROCYTES BLD QL: SIGNIFICANT CHANGE UP
MONOCYTES # BLD AUTO: 1.64 K/UL — HIGH (ref 0–0.9)
MONOCYTES NFR BLD AUTO: 24.2 % — HIGH (ref 2–14)
MONOCYTES NFR BLD: 12.4 % — HIGH (ref 2–9)
MYELOCYTES NFR BLD: 0 % — SIGNIFICANT CHANGE UP (ref 0–0)
NEUTROPHIL AB SER-ACNC: 54 % — SIGNIFICANT CHANGE UP (ref 43–77)
NEUTROPHILS # BLD AUTO: 3.23 K/UL — SIGNIFICANT CHANGE UP (ref 1.8–7.4)
NEUTROPHILS NFR BLD AUTO: 47.7 % — SIGNIFICANT CHANGE UP (ref 43–77)
NEUTS BAND # BLD: 3.5 % — SIGNIFICANT CHANGE UP (ref 0–6)
NRBC # FLD: 0 — SIGNIFICANT CHANGE UP
ORGANISM # SPEC MICROSCOPIC CNT: SIGNIFICANT CHANGE UP
ORGANISM # SPEC MICROSCOPIC CNT: SIGNIFICANT CHANGE UP
OTHER - HEMATOLOGY %: 0 — SIGNIFICANT CHANGE UP
OVALOCYTES BLD QL SMEAR: SLIGHT — SIGNIFICANT CHANGE UP
PHOSPHATE SERPL-MCNC: 3.3 MG/DL — SIGNIFICANT CHANGE UP (ref 2.5–4.5)
PLATELET # BLD AUTO: 103 K/UL — LOW (ref 150–400)
PLATELET COUNT - ESTIMATE: SIGNIFICANT CHANGE UP
PMV BLD: 12.4 FL — SIGNIFICANT CHANGE UP (ref 7–13)
POIKILOCYTOSIS BLD QL AUTO: SLIGHT — SIGNIFICANT CHANGE UP
POTASSIUM SERPL-MCNC: 4.7 MMOL/L — SIGNIFICANT CHANGE UP (ref 3.5–5.3)
POTASSIUM SERPL-SCNC: 4.7 MMOL/L — SIGNIFICANT CHANGE UP (ref 3.5–5.3)
PROMYELOCYTES # FLD: 0 % — SIGNIFICANT CHANGE UP (ref 0–0)
PROT SERPL-MCNC: 7 G/DL — SIGNIFICANT CHANGE UP (ref 6–8.3)
RBC # BLD: 4.7 M/UL — SIGNIFICANT CHANGE UP (ref 4.2–5.8)
RBC # FLD: 14.6 % — HIGH (ref 10.3–14.5)
REVIEW TO FOLLOW: YES — SIGNIFICANT CHANGE UP
SMUDGE CELLS # BLD: PRESENT — SIGNIFICANT CHANGE UP
SODIUM SERPL-SCNC: 134 MMOL/L — LOW (ref 135–145)
SPECIMEN SOURCE: SIGNIFICANT CHANGE UP
TACROLIMUS SERPL-MCNC: 6.7 NG/ML — SIGNIFICANT CHANGE UP
VARIANT LYMPHS # BLD: 8.9 % — SIGNIFICANT CHANGE UP
WBC # BLD: 6.77 K/UL — SIGNIFICANT CHANGE UP (ref 3.8–10.5)
WBC # FLD AUTO: 6.77 K/UL — SIGNIFICANT CHANGE UP (ref 3.8–10.5)

## 2018-07-12 PROCEDURE — 99233 SBSQ HOSP IP/OBS HIGH 50: CPT | Mod: GC

## 2018-07-12 PROCEDURE — 99233 SBSQ HOSP IP/OBS HIGH 50: CPT

## 2018-07-12 RX ORDER — INSULIN LISPRO 100/ML
1 VIAL (ML) SUBCUTANEOUS
Qty: 0 | Refills: 0 | Status: DISCONTINUED | OUTPATIENT
Start: 2018-07-12 | End: 2018-07-18

## 2018-07-12 RX ORDER — AMLODIPINE BESYLATE 2.5 MG/1
5 TABLET ORAL DAILY
Qty: 0 | Refills: 0 | Status: DISCONTINUED | OUTPATIENT
Start: 2018-07-12 | End: 2018-07-13

## 2018-07-12 RX ORDER — HYDRALAZINE HCL 50 MG
25 TABLET ORAL EVERY 8 HOURS
Qty: 0 | Refills: 0 | Status: DISCONTINUED | OUTPATIENT
Start: 2018-07-12 | End: 2018-07-12

## 2018-07-12 RX ORDER — HYDRALAZINE HCL 50 MG
50 TABLET ORAL EVERY 8 HOURS
Qty: 0 | Refills: 0 | Status: DISCONTINUED | OUTPATIENT
Start: 2018-07-12 | End: 2018-07-13

## 2018-07-12 RX ADMIN — Medication 2: at 13:02

## 2018-07-12 RX ADMIN — HEPARIN SODIUM 5000 UNIT(S): 5000 INJECTION INTRAVENOUS; SUBCUTANEOUS at 18:35

## 2018-07-12 RX ADMIN — Medication 250 MILLIGRAM(S): at 13:04

## 2018-07-12 RX ADMIN — ATOVAQUONE 1500 MILLIGRAM(S): 750 SUSPENSION ORAL at 13:02

## 2018-07-12 RX ADMIN — Medication 1: at 22:06

## 2018-07-12 RX ADMIN — Medication 50 MILLIGRAM(S): at 22:07

## 2018-07-12 RX ADMIN — Medication 1 UNIT(S): at 18:36

## 2018-07-12 RX ADMIN — HEPARIN SODIUM 5000 UNIT(S): 5000 INJECTION INTRAVENOUS; SUBCUTANEOUS at 06:17

## 2018-07-12 RX ADMIN — Medication 25 MILLIGRAM(S): at 13:06

## 2018-07-12 RX ADMIN — LEVETIRACETAM 500 MILLIGRAM(S): 250 TABLET, FILM COATED ORAL at 18:35

## 2018-07-12 RX ADMIN — MYCOPHENOLIC ACID 360 MILLIGRAM(S): 180 TABLET, DELAYED RELEASE ORAL at 06:18

## 2018-07-12 RX ADMIN — Medication 50 MILLIGRAM(S): at 14:30

## 2018-07-12 RX ADMIN — Medication 81 MILLIGRAM(S): at 13:03

## 2018-07-12 RX ADMIN — CLOPIDOGREL BISULFATE 75 MILLIGRAM(S): 75 TABLET, FILM COATED ORAL at 13:03

## 2018-07-12 RX ADMIN — Medication 4: at 18:36

## 2018-07-12 RX ADMIN — Medication 1: at 09:02

## 2018-07-12 RX ADMIN — CEFEPIME 100 MILLIGRAM(S): 1 INJECTION, POWDER, FOR SOLUTION INTRAMUSCULAR; INTRAVENOUS at 13:01

## 2018-07-12 RX ADMIN — MYCOPHENOLIC ACID 360 MILLIGRAM(S): 180 TABLET, DELAYED RELEASE ORAL at 18:34

## 2018-07-12 RX ADMIN — PANTOPRAZOLE SODIUM 40 MILLIGRAM(S): 20 TABLET, DELAYED RELEASE ORAL at 06:18

## 2018-07-12 RX ADMIN — TACROLIMUS 1.5 MILLIGRAM(S): 5 CAPSULE ORAL at 18:35

## 2018-07-12 RX ADMIN — ISOSORBIDE MONONITRATE 30 MILLIGRAM(S): 60 TABLET, EXTENDED RELEASE ORAL at 13:03

## 2018-07-12 RX ADMIN — Medication 25 MILLIGRAM(S): at 07:51

## 2018-07-12 RX ADMIN — LEVETIRACETAM 500 MILLIGRAM(S): 250 TABLET, FILM COATED ORAL at 06:18

## 2018-07-12 RX ADMIN — TACROLIMUS 1.5 MILLIGRAM(S): 5 CAPSULE ORAL at 06:17

## 2018-07-12 NOTE — PROGRESS NOTE ADULT - SUBJECTIVE AND OBJECTIVE BOX
CHIEF COMPLAINT: Chills and Rigors    Interval Events: No acute event overnight. Patient is seen and examined at the bedside this morning.   -Still with night sweats but no chills or rigors  -HA and Leg plain last night, resolved with tylenol      REVIEW OF SYSTEMS:   Constitutional: -Chills, -rigors, +drowsiness, +generalized weakness. No fever, no  	CV: No chest pain, or palpitations. No orthopnea.   	Resp: No cough, or sputum production. No shortness of breath or dyspnea on exertion.   	GI: +nausea, +vomiting. No diarrhea or constipation. No abdominal pain.   	: No dysuria, no nocturia, +increased urinary frequency.  	Musculoskeletal: No back pain, no myalgias  	Skin: No rash or itchiness, left foot  	Neurological: No headache or dizziness. No syncope, no weakness, numbness.  Psychiatric: Denies depressed mood.    OBJECTIVE:  Vital Signs Last 24 Hrs  T(C): 36.6 (2018 12:59), Max: 37.4 (2018 22:52)  T(F): 97.8 (2018 12:59), Max: 99.4 (2018 22:52)  HR: 60 (2018 12:59) (59 - 66)  BP: 195/92 (2018 12:59) (136/66 - 195/92)  BP(mean): --  RR: 17 (2018 12:59) (16 - 18)  SpO2: 100% (2018 12:59) (97% - 100%)     @ : @ 07:00  --------------------------------------------------------  IN: 0 mL / OUT: 1400 mL / NET: -1400 mL     @ : @ 14:09  --------------------------------------------------------  IN: 413 mL / OUT: 0 mL / NET: 413 mL      CAPILLARY BLOOD GLUCOSE      POCT Blood Glucose.: 223 mg/dL (2018 12:09)      PHYSICAL EXAM:  General: Alert and cooperative. Not in acute stress. Well developed, well nourished.   Head: Normocephalic, no mass and lesions.  Eyes: Intact visual fields. PERRLA, clear conjunctiva. EOMI, no ptosis.   Throat: Oral cavity and pharynx normal. No inflammation, swelling, exudate, or lesions. Teeth and gingiva in good general condition.  Neck: No lymphadenopathy, no masses, no thyromegaly. Carotid pulses 2+. No JVD.   Respiratory: Bilateral lung clear to auscultation, no crackles, no wheezes, no rhonchi.   Cardiovascular: S1/S2 auscultated, no murmur, or gallop. Rhythm is regular. There is no peripheral edema, cyanosis or pallor. Extremities are warm and well perfused. Capillary refill is less than 2 seconds. No carotid bruits.  Abdomen: Soft, non-tender, nondistended, no guarding or rebound tenderness. Active bowel sounds in all 4 quadrants. No hepatosplenomegaly.   Musculoskeletal: Adequately aligned spine. ROM intact spine and extremities. No joint erythema or tenderness. Normal muscular development. Normal gait.   Extremities: No significant deformity or joint abnormality. Peripheral pulses intact. No varicosities. No peripheral edema, atrophy.   Skin: Intact, no rash. Normal color, texture and turgor with no lesions or eruptions.  Neurological: AOAx4. CN2-12 grosslly intact. Strength and sensation symmetric and intact throughout. Reflexes 2+ throughout. Cerebellar testing normal.  Psychiatry: The mental examination revealed the patient was oriented to person, place, and time. The patient was able to demonstrate good judgement and reason, without hallucinations, abnormal affect or abnormal behaviors during the examination. Patient is not suicidal.     LINES:    HOSPITAL MEDICATIONS:  aspirin enteric coated 81 milliGRAM(s) Oral daily  clopidogrel Tablet 75 milliGRAM(s) Oral daily  heparin  Injectable 5000 Unit(s) SubCutaneous every 12 hours    atovaquone Suspension 1500 milliGRAM(s) Oral daily  cefepime   IVPB 1000 milliGRAM(s) IV Intermittent every 24 hours  vancomycin  IVPB 1000 milliGRAM(s) IV Intermittent every 24 hours    amLODIPine   Tablet 5 milliGRAM(s) Oral daily  hydrALAZINE 50 milliGRAM(s) Oral every 8 hours  isosorbide   mononitrate ER Tablet (IMDUR) 30 milliGRAM(s) Oral daily    dextrose 40% Gel 15 Gram(s) Oral once PRN  dextrose 50% Injectable 12.5 Gram(s) IV Push once  dextrose 50% Injectable 25 Gram(s) IV Push once  dextrose 50% Injectable 25 Gram(s) IV Push once  glucagon  Injectable 1 milliGRAM(s) IntraMuscular once PRN  insulin lispro (HumaLOG) corrective regimen sliding scale   SubCutaneous three times a day before meals  insulin lispro (HumaLOG) corrective regimen sliding scale   SubCutaneous at bedtime      levETIRAcetam 500 milliGRAM(s) Oral two times a day    pantoprazole    Tablet 40 milliGRAM(s) Oral before breakfast        dextrose 5%. 1000 milliLiter(s) IV Continuous <Continuous>  dextrose 5%. 1000 milliLiter(s) IV Continuous <Continuous>    mycophenolic acid  milliGRAM(s) Oral two times a day  tacrolimus 1.5 milliGRAM(s) Oral every 12 hours          LABS:                        12.2   6.77  )-----------( 103      ( 2018 04:45 )             38.4     Hgb Trend: 12.2<--, 13.4<--  07-12    134<L>  |  100  |  26<H>  ----------------------------<  197<H>  4.7   |  20<L>  |  2.14<H>    Ca    9.5      2018 04:45  Phos  3.3     -  Mg     1.8         TPro  7.0  /  Alb  3.3  /  TBili  0.7  /  DBili  x   /  AST  19  /  ALT  15  /  AlkPhos  59  07-12    Creatinine Trend: 2.14<--, 2.20<--    Urinalysis Basic - ( 2018 12:48 )    Color: DARK YELLOW / Appearance: HAZY / S.014 / pH: 6.0  Gluc: 50 / Ketone: NEGATIVE  / Bili: NEGATIVE / Urobili: NORMAL mg/dL   Blood: LARGE / Protein: 300 mg/dL / Nitrite: NEGATIVE   Leuk Esterase: SMALL / RBC: >50 / WBC 25-50   Sq Epi: OCC / Non Sq Epi: x / Bacteria: x        Venous Blood Gas:   @ 14:00  7.38/36/27/21/45.5  VBG Lactate: 1.1  Venous Blood Gas:   @ 12:00  7.38/39/< 24/22/23.5  VBG Lactate: 2.5      MICROBIOLOGY:     RADIOLOGY:  [ ] Reviewed and interpreted by me    EKG: CHIEF COMPLAINT: Chills and Rigors    Interval Events: No acute event overnight. Patient is seen and examined at the bedside this morning.   -Still with night sweats but no chills or rigors  -HA and Leg plain last night, resolved with tylenol      REVIEW OF SYSTEMS:   Constitutional: -Chills, -rigors, +drowsiness, +generalized weakness. No fever, no  	CV: No chest pain, or palpitations. No orthopnea.   	Resp: No cough, or sputum production. No shortness of breath or dyspnea on exertion.   	GI: +nausea, +vomiting. No diarrhea or constipation. No abdominal pain.   	: No dysuria, no nocturia, +increased urinary frequency.  	Musculoskeletal: No back pain, no myalgias  	Skin: No rash or itchiness, left foot  	Neurological: No headache or dizziness. No syncope, no weakness, numbness.  Psychiatric: Denies depressed mood.    OBJECTIVE:  Vital Signs Last 24 Hrs  T(C): 36.6 (2018 12:59), Max: 37.4 (2018 22:52)  T(F): 97.8 (2018 12:59), Max: 99.4 (2018 22:52)  HR: 60 (2018 12:59) (59 - 66)  BP: 195/92 (2018 12:59) (136/66 - 195/92)  BP(mean): --  RR: 17 (2018 12:59) (16 - 18)  SpO2: 100% (2018 12:59) (97% - 100%)     @ : @ 07:00  --------------------------------------------------------  IN: 0 mL / OUT: 1400 mL / NET: -1400 mL     @ : @ 14:09  --------------------------------------------------------  IN: 413 mL / OUT: 0 mL / NET: 413 mL      CAPILLARY BLOOD GLUCOSE      POCT Blood Glucose.: 223 mg/dL (2018 12:09)        PHYSICAL EXAM:  	General: Alert and cooperative. Not in acute stress. Well developed, well nourished.   	Head: Normocephalic,  	Eyes: Decreased vison, clear conjunctiva. EOMI, no ptosis.   	Neck: No lymphadenopathy, no masses, no thyromegaly. Carotid pulses 2+. No JVD.   	Respiratory: Bilateral lung clear to auscultation, no crackles, no wheezes, no rhonchi.   	Cardiovascular: S1/S2 auscultated, no murmur, or gallop. Rhythm is regular. There is no peripheral edema, cyanosis or pallor. Extremities are warm and well perfused.   	Abdomen: Soft, non-tender, nondistended, no guarding or rebound tenderness. Active bowel sounds in all 4 quadrants.   	Musculoskeletal: Adequately aligned spine. ROM intact spine and extremities. Healing wound on left medial malleolus and small area of necrosis on left toe  Neurological: AOAx4. CN2-12 grosslly intact. Decreased sensation in bilateral feet, decreased    LINES:    HOSPITAL MEDICATIONS:  aspirin enteric coated 81 milliGRAM(s) Oral daily  clopidogrel Tablet 75 milliGRAM(s) Oral daily  heparin  Injectable 5000 Unit(s) SubCutaneous every 12 hours    atovaquone Suspension 1500 milliGRAM(s) Oral daily  cefepime   IVPB 1000 milliGRAM(s) IV Intermittent every 24 hours  vancomycin  IVPB 1000 milliGRAM(s) IV Intermittent every 24 hours    amLODIPine   Tablet 5 milliGRAM(s) Oral daily  hydrALAZINE 50 milliGRAM(s) Oral every 8 hours  isosorbide   mononitrate ER Tablet (IMDUR) 30 milliGRAM(s) Oral daily    dextrose 40% Gel 15 Gram(s) Oral once PRN  dextrose 50% Injectable 12.5 Gram(s) IV Push once  dextrose 50% Injectable 25 Gram(s) IV Push once  dextrose 50% Injectable 25 Gram(s) IV Push once  glucagon  Injectable 1 milliGRAM(s) IntraMuscular once PRN  insulin lispro (HumaLOG) corrective regimen sliding scale   SubCutaneous three times a day before meals  insulin lispro (HumaLOG) corrective regimen sliding scale   SubCutaneous at bedtime      levETIRAcetam 500 milliGRAM(s) Oral two times a day    pantoprazole    Tablet 40 milliGRAM(s) Oral before breakfast        dextrose 5%. 1000 milliLiter(s) IV Continuous <Continuous>  dextrose 5%. 1000 milliLiter(s) IV Continuous <Continuous>    mycophenolic acid  milliGRAM(s) Oral two times a day  tacrolimus 1.5 milliGRAM(s) Oral every 12 hours          LABS:                        12.2   6.77  )-----------( 103      ( 2018 04:45 )             38.4     Hgb Trend: 12.2<--, 13.4<--  12    134<L>  |  100  |  26<H>  ----------------------------<  197<H>  4.7   |  20<L>  |  2.14<H>    Ca    9.5      2018 04:45  Phos  3.3       Mg     1.8         TPro  7.0  /  Alb  3.3  /  TBili  0.7  /  DBili  x   /  AST  19  /  ALT  15  /  AlkPhos  59      Creatinine Trend: 2.14<--, 2.20<--    Urinalysis Basic - ( 2018 12:48 )    Color: DARK YELLOW / Appearance: HAZY / S.014 / pH: 6.0  Gluc: 50 / Ketone: NEGATIVE  / Bili: NEGATIVE / Urobili: NORMAL mg/dL   Blood: LARGE / Protein: 300 mg/dL / Nitrite: NEGATIVE   Leuk Esterase: SMALL / RBC: >50 / WBC 25-50   Sq Epi: OCC / Non Sq Epi: x / Bacteria: x        Venous Blood Gas:   @ 14:00  7.38/36/27/21/45.5  VBG Lactate: 1.1  Venous Blood Gas:   @ 12:00  7.38/39/< 24/22/23.5  VBG Lactate: 2.5

## 2018-07-12 NOTE — PROGRESS NOTE ADULT - PROBLEM SELECTOR PLAN 3
-Continue Atovaquone, Mycophenolic acid and Tacrolimus  -F/U Tacrolimus levels  -Nephrology consulted, appreciate recs -Continue Atovaquone, Mycophenolic acid and Tacrolimus  -F/U Tacrolimus levels  -Nephrology consulted, appreciate recs  ---Check sonogram of kidney allograft. Consider ID evaluation. Monitor labs and urine output.

## 2018-07-12 NOTE — PROGRESS NOTE ADULT - PROBLEM SELECTOR PLAN 3
Patient has long history of HTN. Overnight patients BP was 177-193/85-94. Bp uncontrolled, recommend better control of BP. Patient has long history of HTN. Pt. with elevated BP reading. Low salt diet. Monitor BP. Adjust BP medication dose, if BP remains elevated

## 2018-07-12 NOTE — PROGRESS NOTE ADULT - PROBLEM SELECTOR PLAN 1
-Possible sources of infection include medial malleolus wound and picc line  -Started on vanco in the ED; continue. Start on cefepime; both renally dosed   -UA not concerning for a UTI  -Osteomyelitis unlikely given negative X-ray   -F/U Blood and Urine cultures  -Podiatry consult for foot wounds -Possible sources of infection include medial malleolus wound and picc line  -Started on vanco in the ED; continue. Start on cefepime; both renally dosed   -UA not concerning for a UTI  -Osteomyelitis unlikely given negative X-ray   -F/U Blood and Urine cultures  -Podiatry consulted for foot wounds, recs as below  ---foot does not appear acutely infected at this time, no need for dressing at this time

## 2018-07-12 NOTE — PROGRESS NOTE ADULT - SUBJECTIVE AND OBJECTIVE BOX
[facility] DIVISION OF KIDNEY DISEASES AND HYPERTENSION -- FOLLOW UP NOTE  --------------------------------------------------------------------------------  HPI: 53 year old man with a history of DDRT (2/2014 on tacrolimus 1.5 mg BID and mycophenolate 360 mg BID), DM2, HTN, CAD s/p PCI, left nephrectomy, subdural hematoma s/p craniotomy (prior to 2014), seizure disorder, presented to the hospital with 3 days of chills, poor PO intake, and decreased vision. Pt. noted to have elevated Scr of 2.2 on labs in ER. Today his creatinine improved to 2.14. His baseline Scr ranges from 1.3-1.5.     Patient gives history of headache, chills, nausea and "cola" colored urine for the past 3 days. He has had decreased PO intake and has only been drinking some water. 3 weeks ago he was admitted to the hospital in [place] for bacteremia associated with a wound on the medial aspect of his left foot. He was discharged with IV Daptomycin and has been on it for two weeks at home with the assistance of a visiting nurse. This past week his PICC line was found to be occluded and the visiting nurse removed it. Pt. says that he saw his transplant nephrologist Dr. Miller on 7/2/18. Scr was 1.3 on labs done on 7/2/18. He has no SOB, chest pain, diarrhea, constipation.     Today the patient was seen and examined at bedside. He reports one episode of chills with subjective fever overnight but says he has improved overall. He has no more headaches and was able to sleep through most of the night. He has been drinking more water and juice. He has no shortness of breath, no chest pain, no nausea, no vomitting, and no diarrhea.       PAST HISTORY  --------------------------------------------------------------------------------  No significant changes to PMH, PSH, FHx, SHx, unless otherwise noted    ALLERGIES & MEDICATIONS  --------------------------------------------------------------------------------  Allergies    dapsone (Unknown)    Intolerances      Standing Inpatient Medications  aspirin enteric coated 81 milliGRAM(s) Oral daily  atovaquone Suspension 1500 milliGRAM(s) Oral daily  cefepime   IVPB 1000 milliGRAM(s) IV Intermittent every 24 hours  clopidogrel Tablet 75 milliGRAM(s) Oral daily  dextrose 5%. 1000 milliLiter(s) IV Continuous <Continuous>  dextrose 5%. 1000 milliLiter(s) IV Continuous <Continuous>  dextrose 50% Injectable 12.5 Gram(s) IV Push once  dextrose 50% Injectable 25 Gram(s) IV Push once  dextrose 50% Injectable 25 Gram(s) IV Push once  heparin  Injectable 5000 Unit(s) SubCutaneous every 12 hours  hydrALAZINE 25 milliGRAM(s) Oral every 8 hours  insulin lispro (HumaLOG) corrective regimen sliding scale   SubCutaneous three times a day before meals  insulin lispro (HumaLOG) corrective regimen sliding scale   SubCutaneous at bedtime  isosorbide   mononitrate ER Tablet (IMDUR) 30 milliGRAM(s) Oral daily  levETIRAcetam 500 milliGRAM(s) Oral two times a day  mycophenolic acid  milliGRAM(s) Oral two times a day  pantoprazole    Tablet 40 milliGRAM(s) Oral before breakfast  tacrolimus 1.5 milliGRAM(s) Oral every 12 hours  vancomycin  IVPB 1000 milliGRAM(s) IV Intermittent every 24 hours    PRN Inpatient Medications  dextrose 40% Gel 15 Gram(s) Oral once PRN  glucagon  Injectable 1 milliGRAM(s) IntraMuscular once PRN      REVIEW OF SYSTEMS  --------------------------------------------------------------------------------  Gen: fevers and chills overnight  Skin: left lower extremity wounds  Head/Eyes/Ears/Mouth: diminished vision from baseline, No headache; Normal hearing; No sinus pain/discomfort, sore throat  Respiratory: No dyspnea, cough, wheezing, hemoptysis  CV: No chest pain, PND, orthopnea  GI: No abdominal pain, diarrhea, constipation, nausea, vomiting, melena, hematochezia  : improved discolored urine  MSK: No joint pain/swelling; no back pain; no edema  Neuro: No dizziness/lightheadedness, weakness, seizures, numbness, tingling  Heme: No easy bruising or bleeding  Endo: No heat/cold intolerance  Psych: No significant nervousness, anxiety, stress, depression    All other systems were reviewed and are negative, except as noted.    VITALS/PHYSICAL EXAM  --------------------------------------------------------------------------------  T(C): 36.7 (07-12-18 @ 06:11), Max: 37.6 (07-11-18 @ 12:00)  HR: 60 (07-12-18 @ 06:11) (59 - 66)  BP: 186/93 (07-12-18 @ 06:11) (118/59 - 193/94)  RR: 18 (07-12-18 @ 06:11) (16 - 18)  SpO2: 98% (07-12-18 @ 06:11) (97% - 100%)  Wt(kg): --  Height (cm): 170.18 (07-11-18 @ 19:51)  Weight (kg): 80 (07-11-18 @ 19:51)  BMI (kg/m2): 27.6 (07-11-18 @ 19:51)  BSA (m2): 1.92 (07-11-18 @ 19:51)      07-11-18 @ 07:01  -  07-12-18 @ 07:00  --------------------------------------------------------  IN: 0 mL / OUT: 1400 mL / NET: -1400 mL    07-12-18 @ 07:01  -  07-12-18 @ 10:51  --------------------------------------------------------  IN: 295 mL / OUT: 0 mL / NET: 295 mL      Physical Exam:  	Gen: NAD, well-appearing  	HEENT: PERRL, supple neck, clear oropharynx  	Pulm: CTA B/L  	CV: RRR, S1S2; no rub  	Back: No spinal or CVA tenderness; no sacral edema  	Abd: +BS, soft, nontender/nondistended  	: No suprapubic tenderness  	UE: Warm, FROM, no clubbing, intact strength; no edema; no asterixis  	LE: wounds on the distal aspect of the second digit and medial side of the LLE Warm, FROM, no clubbing, intact strength; no edema  	Neuro: No focal deficits, intact gait  	Psych: Normal affect and mood  	Skin: Warm, without rashes  	Vascular access:    LABS/STUDIES  --------------------------------------------------------------------------------              12.2   6.77  >-----------<  103      [07-12-18 @ 04:45]              38.4     134  |  100  |  26  ----------------------------<  197      [07-12-18 @ 04:45]  4.7   |  20  |  2.14        Ca     9.5     [07-12-18 @ 04:45]      Mg     1.8     [07-12-18 @ 04:45]      Phos  3.3     [07-12-18 @ 04:45]    TPro  7.0  /  Alb  3.3  /  TBili  0.7  /  DBili  x   /  AST  19  /  ALT  15  /  AlkPhos  59  [07-12-18 @ 04:45]          Creatinine Trend:  SCr 2.14 [07-12 @ 04:45]  SCr 2.20 [07-11 @ 12:00]    Urinalysis - [07-11-18 @ 12:48]      Color DARK YELLOW / Appearance HAZY / SG 1.014 / pH 6.0      Gluc 50 / Ketone NEGATIVE  / Bili NEGATIVE / Urobili NORMAL       Blood LARGE / Protein 300 / Leuk Est SMALL / Nitrite NEGATIVE      RBC >50 / WBC 25-50 / Hyaline  / Gran  / Sq Epi OCC / Non Sq Epi  / Bacteria     Urine Creatinine 113.60      [07-11-18 @ 18:00]  Urine Sodium 35      [07-11-18 @ 18:00]  Urine Urea Nitrogen 479.0      [07-11-18 @ 18:00]    HbA1c 6.8      [07-12-18 @ 04:45] [facility] DIVISION OF KIDNEY DISEASES AND HYPERTENSION -- FOLLOW UP NOTE  --------------------------------------------------------------------------------  HPI: 53 year old man with a history of DDRT (2/2014 on tacrolimus 1.5 mg BID and mycophenolate 360 mg BID), DM2, HTN, CAD s/p PCI, left nephrectomy, subdural hematoma s/p craniotomy (prior to 2014), seizure disorder, presented to the hospital with 3 days of chills, poor PO intake, and decreased vision. Pt. noted to have elevated Scr of 2.2 on labs in ER. Today his creatinine improved to 2.14. His baseline Scr ranges from 1.3-1.5.     Patient gives history of headache, chills, nausea and "cola" colored urine for the past 3 days. He has had decreased PO intake and has only been drinking some water. 3 weeks ago he was admitted to the hospital in [place] for bacteremia associated with a wound on the medial aspect of his left foot. He was discharged with IV Daptomycin and has been on it for two weeks at home with the assistance of a visiting nurse. This past week his PICC line was found to be occluded and the visiting nurse removed it. Pt. says that he saw his transplant nephrologist Dr. Miller on 7/2/18. Scr was 1.3 on labs done on 7/2/18.    Today the patient was seen and examined at bedside. He reports one episode of chills with subjective fever overnight but says he has improved overall. He has no more headaches and was able to sleep through most of the night. He has been drinking more water and juice. He has no shortness of breath, no chest pain, no nausea, no vomitting, and no diarrhea.       PAST HISTORY  --------------------------------------------------------------------------------  No significant changes to PMH, PSH, FHx, SHx, unless otherwise noted    ALLERGIES & MEDICATIONS  --------------------------------------------------------------------------------  Allergies    dapsone (Unknown)    Intolerances    Standing Inpatient Medications  aspirin enteric coated 81 milliGRAM(s) Oral daily  atovaquone Suspension 1500 milliGRAM(s) Oral daily  cefepime   IVPB 1000 milliGRAM(s) IV Intermittent every 24 hours  clopidogrel Tablet 75 milliGRAM(s) Oral daily  dextrose 5%. 1000 milliLiter(s) IV Continuous <Continuous>  dextrose 5%. 1000 milliLiter(s) IV Continuous <Continuous>  dextrose 50% Injectable 12.5 Gram(s) IV Push once  dextrose 50% Injectable 25 Gram(s) IV Push once  dextrose 50% Injectable 25 Gram(s) IV Push once  heparin  Injectable 5000 Unit(s) SubCutaneous every 12 hours  hydrALAZINE 25 milliGRAM(s) Oral every 8 hours  insulin lispro (HumaLOG) corrective regimen sliding scale   SubCutaneous three times a day before meals  insulin lispro (HumaLOG) corrective regimen sliding scale   SubCutaneous at bedtime  isosorbide   mononitrate ER Tablet (IMDUR) 30 milliGRAM(s) Oral daily  levETIRAcetam 500 milliGRAM(s) Oral two times a day  mycophenolic acid  milliGRAM(s) Oral two times a day  pantoprazole    Tablet 40 milliGRAM(s) Oral before breakfast  tacrolimus 1.5 milliGRAM(s) Oral every 12 hours  vancomycin  IVPB 1000 milliGRAM(s) IV Intermittent every 24 hours    REVIEW OF SYSTEMS  --------------------------------------------------------------------------------  Gen: fevers and chills overnight  Skin: left lower extremity wounds  Head/Eyes/Ears/Mouth: diminished vision from baseline, No headache; Normal hearing; No sinus pain/discomfort, sore throat  Respiratory: No dyspnea  CV: No chest pain  GI: See HPI  : improved discolored urine  MSK: No joint pain/swelling; no back pain; no edema  Neuro: No dizziness  Heme: No bleeding  Endo: See HPI  Psych: No depression    All other systems were reviewed and are negative, except as noted.    VITALS/PHYSICAL EXAM  --------------------------------------------------------------------------------  T(C): 36.7 (07-12-18 @ 06:11), Max: 37.6 (07-11-18 @ 12:00)  HR: 60 (07-12-18 @ 06:11) (59 - 66)  BP: 186/93 (07-12-18 @ 06:11) (118/59 - 193/94)  RR: 18 (07-12-18 @ 06:11) (16 - 18)  SpO2: 98% (07-12-18 @ 06:11) (97% - 100%)  Wt(kg): --  Height (cm): 170.18 (07-11-18 @ 19:51)  Weight (kg): 80 (07-11-18 @ 19:51)  BMI (kg/m2): 27.6 (07-11-18 @ 19:51)  BSA (m2): 1.92 (07-11-18 @ 19:51)      07-11-18 @ 07:01  -  07-12-18 @ 07:00  --------------------------------------------------------  IN: 0 mL / OUT: 1400 mL / NET: -1400 mL    07-12-18 @ 07:01  -  07-12-18 @ 10:51  --------------------------------------------------------  IN: 295 mL / OUT: 0 mL / NET: 295 mL      Physical Exam:  	Gen: resting, NAD  	HEENT: No JVD  	Pulm: CTA B/L  	CV: S1S2+  	Abd: Soft, +BS   	Ext: LUE AVF: +thrill, Healing wound on the medial aspect of the left foot+, No LE edema B/L  	Neuro: Awake  	Skin: Warm and dry  	Vascular access: peripheral IV on right arm    LABS/STUDIES  --------------------------------------------------------------------------------              12.2   6.77  >-----------<  103      [07-12-18 @ 04:45]              38.4     134  |  100  |  26  ----------------------------<  197      [07-12-18 @ 04:45]  4.7   |  20  |  2.14        Ca     9.5     [07-12-18 @ 04:45]      Mg     1.8     [07-12-18 @ 04:45]      Phos  3.3     [07-12-18 @ 04:45]    TPro  7.0  /  Alb  3.3  /  TBili  0.7  /  DBili  x   /  AST  19  /  ALT  15  /  AlkPhos  59  [07-12-18 @ 04:45]    Creatinine Trend:  SCr 2.14 [07-12 @ 04:45]  SCr 2.20 [07-11 @ 12:00]    Urinalysis - [07-11-18 @ 12:48]      Color DARK YELLOW / Appearance HAZY / SG 1.014 / pH 6.0      Gluc 50 / Ketone NEGATIVE  / Bili NEGATIVE / Urobili NORMAL       Blood LARGE / Protein 300 / Leuk Est SMALL / Nitrite NEGATIVE      RBC >50 / WBC 25-50 / Hyaline  / Gran  / Sq Epi OCC / Non Sq Epi  / Bacteria     Urine Creatinine 113.60      [07-11-18 @ 18:00]  Urine Sodium 35      [07-11-18 @ 18:00]  Urine Urea Nitrogen 479.0      [07-11-18 @ 18:00]    HbA1c 6.8      [07-12-18 @ 04:45]

## 2018-07-12 NOTE — CONSULT NOTE ADULT - SUBJECTIVE AND OBJECTIVE BOX
Patient is a 53y old  Male who presents with a chief complaint of as per patient had chills when taking infusion. as per patient was feeling well x3 days. (2018 19:57)      HPI:  Patient is a 53 year old male with a PMH of CAD s/p stents in  and s/p pacemaker, MI, ESRD s/p renal transplant in , HTN, HLD, CAD, DM, presenting with a three day history of chills, rigors, and night sweats. About three weeks ago patient was hospitalized at [facility] [place] [facility] in [place] after experiencing leg weakness, and discharged home on Daptomycin 500mg IV daily via Picc line for treatment of MRSA  bacteremia. Source of infection thought to be a wound on his medial left foot received while mowing his lawn. He was tolerating his IV medication well, until Saturday when he received a new shipment from his pharmacy and upon his first infusion noticed that he felt sleepy/drowsy, as if he were drunk. He had no issues with the medication on , but noticed that about 20 minutes after receiving his dose on Monday he started experiencing extreme rigors, chills, which lasted for about 4 hours. He had a similar experience on Tuesday again, this time lasting for about 2hrs. He stated that the dose he received might be higher than that which was prescribed, but he is unsure He also reported noticing a foul smell from his picc line (now removed). Denied headache, lightheadedness, chest pain, shortness of breath, dysuria, back pain, abdominal pain , diarrhea or constipation. He endorsed some increased urinary frequency. Also endorsed decreased PO intake.    ED Course:  Patient was afebrile in the ED, Tmax 98.2, HR 60, SERGEY 133/77, RR 18, O2 sat 98% on RA UA, CXR, left ankle x-ray were obtained. No leukocytosis. Hyponatremic. Started on NS and Vancomycin. (2018 15:45)      PAST MEDICAL & SURGICAL HISTORY:  Seizure: 1 episode   Hypercholesteremia  Subdural hematoma:  treated with craniotomy/ shunt  Diabetes mellitus  HTN (hypertension)  MI (myocardial infarction)  CAD (coronary artery disease)  Kidney transplanted:  donor   Pacemaker: [employer]  History of ventricular shunt:   History of craniotomy:   H/O unilateral nephrectomy  S/P primary angioplasty with coronary stent: 4 total stents (first stent placed , one year later other 3 stents placed)      MEDICATIONS  (STANDING):  aspirin enteric coated 81 milliGRAM(s) Oral daily  atovaquone Suspension 1500 milliGRAM(s) Oral daily  cefepime   IVPB 1000 milliGRAM(s) IV Intermittent every 24 hours  clopidogrel Tablet 75 milliGRAM(s) Oral daily  dextrose 5%. 1000 milliLiter(s) (50 mL/Hr) IV Continuous <Continuous>  dextrose 5%. 1000 milliLiter(s) (50 mL/Hr) IV Continuous <Continuous>  dextrose 50% Injectable 12.5 Gram(s) IV Push once  dextrose 50% Injectable 25 Gram(s) IV Push once  dextrose 50% Injectable 25 Gram(s) IV Push once  heparin  Injectable 5000 Unit(s) SubCutaneous every 12 hours  hydrALAZINE 25 milliGRAM(s) Oral every 8 hours  insulin lispro (HumaLOG) corrective regimen sliding scale   SubCutaneous three times a day before meals  insulin lispro (HumaLOG) corrective regimen sliding scale   SubCutaneous at bedtime  isosorbide   mononitrate ER Tablet (IMDUR) 30 milliGRAM(s) Oral daily  levETIRAcetam 500 milliGRAM(s) Oral two times a day  mycophenolic acid  milliGRAM(s) Oral two times a day  pantoprazole    Tablet 40 milliGRAM(s) Oral before breakfast  tacrolimus 1.5 milliGRAM(s) Oral every 12 hours  vancomycin  IVPB 1000 milliGRAM(s) IV Intermittent every 24 hours    MEDICATIONS  (PRN):  dextrose 40% Gel 15 Gram(s) Oral once PRN Blood Glucose LESS THAN 70 milliGRAM(s)/deciliter  glucagon  Injectable 1 milliGRAM(s) IntraMuscular once PRN Glucose LESS THAN 70 milligrams/deciliter      Allergies    dapsone (Unknown)    Intolerances        VITALS:    Vital Signs Last 24 Hrs  T(C): 36.7 (2018 06:11), Max: 37.6 (2018 12:00)  T(F): 98.1 (2018 06:11), Max: 99.7 (2018 12:00)  HR: 60 (2018 06:11) (59 - 66)  BP: 186/93 (2018 06:11) (118/59 - 193/94)  BP(mean): --  RR: 18 (2018 06:11) (16 - 18)  SpO2: 98% (2018 06:11) (97% - 100%)    LABS:                          12.2   6.77  )-----------( 103      ( 2018 04:45 )             38.4       07-12    134<L>  |  100  |  26<H>  ----------------------------<  197<H>  4.7   |  20<L>  |  2.14<H>    Ca    9.5      2018 04:45  Phos  3.3     07-12  Mg     1.8     -12    TPro  7.0  /  Alb  3.3  /  TBili  0.7  /  DBili  x   /  AST  19  /  ALT  15  /  AlkPhos  59  07-12      CAPILLARY BLOOD GLUCOSE      POCT Blood Glucose.: 164 mg/dL (2018 08:50)  POCT Blood Glucose.: 226 mg/dL (2018 22:39)  POCT Blood Glucose.: 157 mg/dL (2018 17:51)          LOWER EXTREMITY PHYSICAL EXAM:    Vasular: DP/PT 1/4, B/L, CFT <3 seconds B/L   Neuro: Epicritic sensation diminished  Musculoskeletal/Ortho: no gross deformities  Skin: stable eschar over medial aspect of foot at navicular, post inflammatory skin sloughing noted around eschar, edges well adhered. Callus and stable eschar noted at distal tip of 2nd toe      RADIOLOGY & ADDITIONAL STUDIES:  < from: Xray Ankle Complete 3 Views, Left (18 @ 11:54) >  EXAM:  RAD ANKLE MIN 3 VIEWS LEFT        PROCEDURE DATE:  2018         INTERPRETATION:  TIME OF EXAM: 2018 at 11:40 AM    CLINICAL INFORMATION: Medial malleolar ulceration.    TECHNIQUE:   AP, oblique and lateral radiographs of the left ankle.    INTERPRETATION:     There is no acute fracture or dislocation. No subcutaneous air or bone   destruction.  Vascular calcification is seen.      COMPARISON:  None available      IMPRESSION:  No acute bone or joint disease.                  YEIMY LEW M.D., ATTENDING RADIOLOGIST  This document has been electronically signed. 2018 12:27PM    < end of copied text >

## 2018-07-12 NOTE — PROGRESS NOTE ADULT - PROBLEM SELECTOR PLAN 2
The patient had a DDRT in 2014 and is on tacrolimus 1.5 mg BID and mycophenolate 360 mg BID. Recommend continuing home dose of tacrolimus and mycophenolate and F/U tacrolimus level from AM labs (in progress). The patient had a DDRT in 2014 and is on tacrolimus 1.5 mg BID and mycophenolate 360 mg BID. Continue with tacrolimus and mycophenolate and F/U tacrolimus level from AM labs

## 2018-07-12 NOTE — PROGRESS NOTE ADULT - PROBLEM SELECTOR PLAN 2
-RICCO on CKD, consider prerenal vs ATN  -monitor -RICCO on CKD, consider prerenal vs ATN  -monitor  -F/U CPK

## 2018-07-12 NOTE — CONSULT NOTE ADULT - ASSESSMENT
53M w/ GNR bacteremia and stable eschars L foot  -seen and evaluated  -foot does not appear acutely infected at this time  -No need for dressing at this time  -abx per primary team  -no pod surgical intervention  -d/w attending

## 2018-07-12 NOTE — PROGRESS NOTE ADULT - ASSESSMENT
53 year old man with a history of DDRT (2014), CAD, DM2, HTN, siezure presents to the hospital with chills, decreased vision, headache. Pt. found to have RICCO.

## 2018-07-12 NOTE — PROGRESS NOTE ADULT - PROBLEM SELECTOR PLAN 1
Problem: RICCO (acute kidney injury). Recommendation: Pt. with RICCO, likely hemodynamically mediated in setting of decreased oral intake and likely infection. Scr was 1.3 on labs done on 7/2/18, increased to 2.2 on 7/11/18 and has improved to 2.14 today. Pt. received IVF in ER. Recommend IV NS @ 75 cc/hr. Check CPK level (pt. with 3-day history of coca-colored urine). Consider ID evaluation. Monitor labs and urine output. Avoid any potential nephrotoxins. Pt. with RICCO, likely hemodynamically mediated in setting of decreased oral intake and likely infection. Scr was 1.3 on labs done on 7/2/18, increased to 2.2 on 7/11/18 and has improved to 2.14 today. Pt. received IVF in ER. Recommend IV NS @ 75 cc/hr. Check CPK level (pt. with 3-day history of coca-colored urine). Check sonogram of kidney allograft. Consider ID evaluation. Monitor labs and urine output. Avoid any potential nephrotoxins.

## 2018-07-13 DIAGNOSIS — R78.81 BACTEREMIA: ICD-10-CM

## 2018-07-13 LAB
ALBUMIN SERPL ELPH-MCNC: 3.3 G/DL — SIGNIFICANT CHANGE UP (ref 3.3–5)
ALP SERPL-CCNC: 58 U/L — SIGNIFICANT CHANGE UP (ref 40–120)
ALT FLD-CCNC: 16 U/L — SIGNIFICANT CHANGE UP (ref 4–41)
AST SERPL-CCNC: 23 U/L — SIGNIFICANT CHANGE UP (ref 4–40)
BILIRUB SERPL-MCNC: 0.5 MG/DL — SIGNIFICANT CHANGE UP (ref 0.2–1.2)
BUN SERPL-MCNC: 23 MG/DL — SIGNIFICANT CHANGE UP (ref 7–23)
CALCIUM SERPL-MCNC: 9.5 MG/DL — SIGNIFICANT CHANGE UP (ref 8.4–10.5)
CHLORIDE SERPL-SCNC: 100 MMOL/L — SIGNIFICANT CHANGE UP (ref 98–107)
CK MB BLD-MCNC: 1.27 NG/ML — SIGNIFICANT CHANGE UP (ref 1–6.6)
CK MB BLD-MCNC: SIGNIFICANT CHANGE UP (ref 0–2.5)
CK SERPL-CCNC: 31 U/L — SIGNIFICANT CHANGE UP (ref 30–200)
CK SERPL-CCNC: 33 U/L — SIGNIFICANT CHANGE UP (ref 30–200)
CO2 SERPL-SCNC: 21 MMOL/L — LOW (ref 22–31)
CREAT SERPL-MCNC: 2 MG/DL — HIGH (ref 0.5–1.3)
GLUCOSE SERPL-MCNC: 220 MG/DL — HIGH (ref 70–99)
MAGNESIUM SERPL-MCNC: 1.6 MG/DL — SIGNIFICANT CHANGE UP (ref 1.6–2.6)
ORGANISM # SPEC MICROSCOPIC CNT: SIGNIFICANT CHANGE UP
ORGANISM # SPEC MICROSCOPIC CNT: SIGNIFICANT CHANGE UP
PHOSPHATE SERPL-MCNC: 3.5 MG/DL — SIGNIFICANT CHANGE UP (ref 2.5–4.5)
POTASSIUM SERPL-MCNC: 4.6 MMOL/L — SIGNIFICANT CHANGE UP (ref 3.5–5.3)
POTASSIUM SERPL-SCNC: 4.6 MMOL/L — SIGNIFICANT CHANGE UP (ref 3.5–5.3)
PROT SERPL-MCNC: 7.3 G/DL — SIGNIFICANT CHANGE UP (ref 6–8.3)
SODIUM SERPL-SCNC: 134 MMOL/L — LOW (ref 135–145)
TACROLIMUS SERPL-MCNC: 6.3 NG/ML — SIGNIFICANT CHANGE UP
VANCOMYCIN TROUGH SERPL-MCNC: 11 UG/ML — SIGNIFICANT CHANGE UP (ref 10–20)

## 2018-07-13 PROCEDURE — 99233 SBSQ HOSP IP/OBS HIGH 50: CPT

## 2018-07-13 PROCEDURE — 99222 1ST HOSP IP/OBS MODERATE 55: CPT | Mod: GC

## 2018-07-13 PROCEDURE — 99233 SBSQ HOSP IP/OBS HIGH 50: CPT | Mod: GC

## 2018-07-13 RX ORDER — VANCOMYCIN HCL 1 G
VIAL (EA) INTRAVENOUS
Qty: 0 | Refills: 0 | Status: DISCONTINUED | OUTPATIENT
Start: 2018-07-13 | End: 2018-07-13

## 2018-07-13 RX ORDER — MYCOPHENOLIC ACID 180 MG/1
360 TABLET, DELAYED RELEASE ORAL
Qty: 0 | Refills: 0 | Status: DISCONTINUED | OUTPATIENT
Start: 2018-07-13 | End: 2018-07-18

## 2018-07-13 RX ORDER — PIPERACILLIN AND TAZOBACTAM 4; .5 G/20ML; G/20ML
3.38 INJECTION, POWDER, LYOPHILIZED, FOR SOLUTION INTRAVENOUS EVERY 12 HOURS
Qty: 0 | Refills: 0 | Status: DISCONTINUED | OUTPATIENT
Start: 2018-07-13 | End: 2018-07-16

## 2018-07-13 RX ORDER — MYCOPHENOLIC ACID 180 MG/1
720 TABLET, DELAYED RELEASE ORAL
Qty: 0 | Refills: 0 | Status: DISCONTINUED | OUTPATIENT
Start: 2018-07-13 | End: 2018-07-13

## 2018-07-13 RX ORDER — AMLODIPINE BESYLATE 2.5 MG/1
10 TABLET ORAL DAILY
Qty: 0 | Refills: 0 | Status: DISCONTINUED | OUTPATIENT
Start: 2018-07-13 | End: 2018-07-18

## 2018-07-13 RX ORDER — VANCOMYCIN HCL 1 G
1000 VIAL (EA) INTRAVENOUS EVERY 24 HOURS
Qty: 0 | Refills: 0 | Status: DISCONTINUED | OUTPATIENT
Start: 2018-07-13 | End: 2018-07-18

## 2018-07-13 RX ORDER — ISOSORBIDE MONONITRATE 60 MG/1
30 TABLET, EXTENDED RELEASE ORAL ONCE
Qty: 0 | Refills: 0 | Status: COMPLETED | OUTPATIENT
Start: 2018-07-13 | End: 2018-07-13

## 2018-07-13 RX ORDER — ISOSORBIDE MONONITRATE 60 MG/1
60 TABLET, EXTENDED RELEASE ORAL DAILY
Qty: 0 | Refills: 0 | Status: DISCONTINUED | OUTPATIENT
Start: 2018-07-14 | End: 2018-07-18

## 2018-07-13 RX ORDER — PIPERACILLIN AND TAZOBACTAM 4; .5 G/20ML; G/20ML
3.38 INJECTION, POWDER, LYOPHILIZED, FOR SOLUTION INTRAVENOUS EVERY 8 HOURS
Qty: 0 | Refills: 0 | Status: DISCONTINUED | OUTPATIENT
Start: 2018-07-13 | End: 2018-07-13

## 2018-07-13 RX ORDER — HYDRALAZINE HCL 50 MG
25 TABLET ORAL ONCE
Qty: 0 | Refills: 0 | Status: COMPLETED | OUTPATIENT
Start: 2018-07-13 | End: 2018-07-13

## 2018-07-13 RX ORDER — HYDRALAZINE HCL 50 MG
75 TABLET ORAL EVERY 8 HOURS
Qty: 0 | Refills: 0 | Status: DISCONTINUED | OUTPATIENT
Start: 2018-07-13 | End: 2018-07-18

## 2018-07-13 RX ADMIN — CLOPIDOGREL BISULFATE 75 MILLIGRAM(S): 75 TABLET, FILM COATED ORAL at 12:36

## 2018-07-13 RX ADMIN — Medication 1: at 08:57

## 2018-07-13 RX ADMIN — Medication 1 UNIT(S): at 12:34

## 2018-07-13 RX ADMIN — LEVETIRACETAM 500 MILLIGRAM(S): 250 TABLET, FILM COATED ORAL at 17:56

## 2018-07-13 RX ADMIN — Medication 2: at 22:09

## 2018-07-13 RX ADMIN — Medication 75 MILLIGRAM(S): at 14:38

## 2018-07-13 RX ADMIN — Medication 2: at 17:56

## 2018-07-13 RX ADMIN — AMLODIPINE BESYLATE 5 MILLIGRAM(S): 2.5 TABLET ORAL at 05:42

## 2018-07-13 RX ADMIN — Medication 75 MILLIGRAM(S): at 21:52

## 2018-07-13 RX ADMIN — Medication 1 UNIT(S): at 17:56

## 2018-07-13 RX ADMIN — ISOSORBIDE MONONITRATE 30 MILLIGRAM(S): 60 TABLET, EXTENDED RELEASE ORAL at 17:55

## 2018-07-13 RX ADMIN — ISOSORBIDE MONONITRATE 30 MILLIGRAM(S): 60 TABLET, EXTENDED RELEASE ORAL at 12:37

## 2018-07-13 RX ADMIN — PIPERACILLIN AND TAZOBACTAM 25 GRAM(S): 4; .5 INJECTION, POWDER, LYOPHILIZED, FOR SOLUTION INTRAVENOUS at 19:30

## 2018-07-13 RX ADMIN — Medication 81 MILLIGRAM(S): at 12:36

## 2018-07-13 RX ADMIN — HEPARIN SODIUM 5000 UNIT(S): 5000 INJECTION INTRAVENOUS; SUBCUTANEOUS at 05:43

## 2018-07-13 RX ADMIN — Medication 250 MILLIGRAM(S): at 17:54

## 2018-07-13 RX ADMIN — Medication 25 MILLIGRAM(S): at 10:58

## 2018-07-13 RX ADMIN — AMLODIPINE BESYLATE 10 MILLIGRAM(S): 2.5 TABLET ORAL at 10:58

## 2018-07-13 RX ADMIN — TACROLIMUS 1.5 MILLIGRAM(S): 5 CAPSULE ORAL at 17:57

## 2018-07-13 RX ADMIN — Medication 50 MILLIGRAM(S): at 05:43

## 2018-07-13 RX ADMIN — Medication 1 UNIT(S): at 08:57

## 2018-07-13 RX ADMIN — TACROLIMUS 1.5 MILLIGRAM(S): 5 CAPSULE ORAL at 05:42

## 2018-07-13 RX ADMIN — LEVETIRACETAM 500 MILLIGRAM(S): 250 TABLET, FILM COATED ORAL at 05:43

## 2018-07-13 RX ADMIN — Medication 3: at 12:34

## 2018-07-13 RX ADMIN — ATOVAQUONE 1500 MILLIGRAM(S): 750 SUSPENSION ORAL at 12:36

## 2018-07-13 RX ADMIN — HEPARIN SODIUM 5000 UNIT(S): 5000 INJECTION INTRAVENOUS; SUBCUTANEOUS at 17:55

## 2018-07-13 RX ADMIN — MYCOPHENOLIC ACID 360 MILLIGRAM(S): 180 TABLET, DELAYED RELEASE ORAL at 17:55

## 2018-07-13 RX ADMIN — MYCOPHENOLIC ACID 360 MILLIGRAM(S): 180 TABLET, DELAYED RELEASE ORAL at 05:42

## 2018-07-13 RX ADMIN — PANTOPRAZOLE SODIUM 40 MILLIGRAM(S): 20 TABLET, DELAYED RELEASE ORAL at 08:28

## 2018-07-13 NOTE — PROGRESS NOTE ADULT - PROBLEM SELECTOR PLAN 1
Pt. with RICCO, likely hemodynamically mediated in setting of decreased oral intake and likely infection. Scr was 1.3 on labs done on 7/2/18, increased to 2.2 on 7/11/18 and has improved to 2.00 today. Recommend IV NS @ 75 cc/hr. Check CPK level (pt. with 3-day history of coca-colored urine). Check sonogram of kidney allograft. Consider ID evaluation. Monitor labs and urine output. Avoid any potential nephrotoxins. Pt. with RICCO, likely hemodynamically mediated in setting of bacteremia and decreased oral intake. Scr was 1.3 on labs done on 7/2/18, increased to 2.2 on 7/11/18 and has improved to 2.0 today. Recommend IV NS @ 75 cc/hr. Check CPK level (pt. with recent history of coca-colored urine). Check sonogram of kidney allograft. Consider ID evaluation. Monitor labs and urine output. Avoid any potential nephrotoxins

## 2018-07-13 NOTE — PROGRESS NOTE ADULT - PROBLEM SELECTOR PLAN 3
Patient has long history of HTN. Pt. with elevated BP reading. Low salt diet. Monitor BP. Adjust BP medication dose, if BP remains elevated

## 2018-07-13 NOTE — PROGRESS NOTE ADULT - SUBJECTIVE AND OBJECTIVE BOX
[facility] DIVISION OF KIDNEY DISEASES AND HYPERTENSION -- FOLLOW UP NOTE  --------------------------------------------------------------------------------  HPI: 53 year old man with a history of DDRT (2/2014 on tacrolimus 1.5 mg BID and mycophenolate 360 mg BID), DM2, HTN, CAD s/p PCI, left nephrectomy, subdural hematoma s/p craniotomy (prior to 2014), seizure disorder, presented to the hospital with 3 days of chills, poor PO intake, and decreased vision. Pt. noted to have elevated Scr of 2.2 on labs in ER. Today his creatinine improved to 2.14. His baseline Scr ranges from 1.3-1.5.     Patient gives history of headache, chills, nausea and "cola" colored urine for the past 3 days. He has had decreased PO intake and has only been drinking some water. 3 weeks ago he was admitted to the hospital in [place] for bacteremia associated with a wound on the medial aspect of his left foot. He was discharged with IV Daptomycin and has been on it for two weeks at home with the assistance of a visiting nurse. This past week his PICC line was found to be occluded and the visiting nurse removed it. Pt. says that he saw his transplant nephrologist Dr. Miller on 7/2/18. Scr was 1.3 on labs done on 7/2/18.    Today the patient was seen and examined at bedside. He feels improved overall, but still having fevers overnight. He has intermittent headaches. He has been drinking more water and juice. He has no shortness of breath, no chest pain, no nausea, no vomiting, and no diarrhea.    PAST HISTORY  --------------------------------------------------------------------------------  No significant changes to PMH, PSH, FHx, SHx, unless otherwise noted    ALLERGIES & MEDICATIONS  --------------------------------------------------------------------------------  Allergies    dapsone (Unknown)    Intolerances    Standing Inpatient Medications  amLODIPine   Tablet 10 milliGRAM(s) Oral daily  aspirin enteric coated 81 milliGRAM(s) Oral daily  atovaquone Suspension 1500 milliGRAM(s) Oral daily  cefepime   IVPB 1000 milliGRAM(s) IV Intermittent every 24 hours  clopidogrel Tablet 75 milliGRAM(s) Oral daily  dextrose 5%. 1000 milliLiter(s) IV Continuous <Continuous>  dextrose 5%. 1000 milliLiter(s) IV Continuous <Continuous>  dextrose 50% Injectable 12.5 Gram(s) IV Push once  dextrose 50% Injectable 25 Gram(s) IV Push once  dextrose 50% Injectable 25 Gram(s) IV Push once  heparin  Injectable 5000 Unit(s) SubCutaneous every 12 hours  hydrALAZINE 75 milliGRAM(s) Oral every 8 hours  insulin lispro (HumaLOG) corrective regimen sliding scale   SubCutaneous three times a day before meals  insulin lispro (HumaLOG) corrective regimen sliding scale   SubCutaneous at bedtime  insulin lispro Injectable (HumaLOG) 1 Unit(s) SubCutaneous three times a day before meals  isosorbide   mononitrate ER Tablet (IMDUR) 30 milliGRAM(s) Oral daily  levETIRAcetam 500 milliGRAM(s) Oral two times a day  mycophenolic acid  milliGRAM(s) Oral two times a day  pantoprazole    Tablet 40 milliGRAM(s) Oral before breakfast  tacrolimus 1.5 milliGRAM(s) Oral every 12 hours  vancomycin  IVPB 1000 milliGRAM(s) IV Intermittent every 24 hours    PRN Inpatient Medications  dextrose 40% Gel 15 Gram(s) Oral once PRN  glucagon  Injectable 1 milliGRAM(s) IntraMuscular once PRN      REVIEW OF SYSTEMS  --------------------------------------------------------------------------------  Gen: fevers and chills overnight  Skin: left lower extremity wounds  Head/Eyes/Ears/Mouth: diminished vision from baseline, No headache; Normal hearing; No sinus pain/discomfort, sore throat  Respiratory: No dyspnea  CV: No chest pain  GI: See HPI  : improved discolored urine  MSK: No joint pain/swelling; no back pain; no edema  Neuro: No dizziness  Heme: No bleeding  Endo: See HPI  Psych: No depression    All other systems were reviewed and are negative, except as noted.  VITALS/PHYSICAL EXAM  --------------------------------------------------------------------------------  T(C): 37.1 (07-13-18 @ 05:39), Max: 37.1 (07-13-18 @ 05:39)  HR: 60 (07-13-18 @ 10:57) (60 - 60)  BP: 187/87 (07-13-18 @ 10:57) (168/92 - 195/92)  RR: 18 (07-13-18 @ 05:39) (17 - 18)  SpO2: 99% (07-13-18 @ 05:39) (99% - 100%)  Wt(kg): --  Height (cm): 170.18 (07-11-18 @ 19:51)  Weight (kg): 80 (07-11-18 @ 19:51)  BMI (kg/m2): 27.6 (07-11-18 @ 19:51)  BSA (m2): 1.92 (07-11-18 @ 19:51)      07-12-18 @ 07:01  -  07-13-18 @ 07:00  --------------------------------------------------------  IN: 2015 mL / OUT: 1680 mL / NET: 335 mL    07-13-18 @ 07:01  -  07-13-18 @ 11:14  --------------------------------------------------------  IN: 0 mL / OUT: 1100 mL / NET: -1100 mL      Physical Exam:  Gen: resting, NAD  	HEENT: No JVD  	Pulm: CTA B/L  	CV: S1S2+  	Abd: Soft, +BS   	Ext: LUE AVF: +thrill, Healing wound on the medial aspect of the left foot+, No LE edema B/L  	Neuro: Awake  	Skin: Warm and dry  	Vascular access: peripheral IV on right arm      LABS/STUDIES  --------------------------------------------------------------------------------              12.2   6.77  >-----------<  103      [07-12-18 @ 04:45]              38.4     134  |  100  |  23  ----------------------------<  220      [07-13-18 @ 05:30]  4.6   |  21  |  2.00        Ca     9.5     [07-13-18 @ 05:30]      Mg     1.6     [07-13-18 @ 05:30]      Phos  3.5     [07-13-18 @ 05:30]    TPro  7.3  /  Alb  3.3  /  TBili  0.5  /  DBili  x   /  AST  23  /  ALT  16  /  AlkPhos  58  [07-13-18 @ 05:30]    Creatinine Trend:  SCr 2.00 [07-13 @ 05:30]  SCr 2.14 [07-12 @ 04:45]  SCr 2.20 [07-11 @ 12:00]

## 2018-07-13 NOTE — PROGRESS NOTE ADULT - PROBLEM SELECTOR PLAN 1
-Possible sources of infection include medial malleolus wound and picc line  -Started on vanco in the ED; continue. Start on cefepime; both renally dosed   -UA not concerning for a UTI  -Osteomyelitis unlikely given negative X-ray   -F/U Blood and Urine cultures  -Podiatry consulted for foot wounds, recs as below  ---foot does not appear acutely infected at this time, no need for dressing at this time -Possible sources of infection include medial malleolus wound and picc line  -Started on vanco in the ED; continue. Start on cefepime; both renally dosed   -UA not concerning for a UTI  -Osteomyelitis unlikely given negative X-ray   -F/U Blood and Urine cultures  -Podiatry consulted for foot wounds, recs as below  ---foot does not appear acutely infected at this time, no need for dressing at this time  -ID consulted per nephrologies recommendation -Possible sources of infection include medial malleolus wound and picc line  -Started on vanco in the ED; continue. Start on cefepime; both renally dosed   -UA not concerning for a UTI  -Osteomyelitis unlikely given negative X-ray   -F/U Blood and Urine cultures  -Podiatry consulted for foot wounds, recs as below  ---foot does not appear acutely infected at this time, no need for dressing at this time  -ID consulted per nephrology's recommendation

## 2018-07-13 NOTE — CONSULT NOTE ADULT - ATTENDING COMMENTS
53 year old male with history of pacemaker, ESRD s/p R renal transplant in 2014, DM with recent MRSA bacteremia being treated with daptomycin, presents with chills.  Was on Daptomycin until 7/10, planned through 8/1 for MRSA bacteremia  Here presents with fevers/chills, bandemia  BCX with GNR 1/4, not identified by PCR, aerobic bottle  No other focal signs to suggest cause of bacteremia--UA equivocal, UCX low CFU GNR, no abd pain, fistula clean  Overall, GNR bacteremia, prior MRSA bacteremia, bandemia  - Zosyn 3.375g q 8  - Vancomycin 1g q 24 (trough before 4th dose)  - F/U BCX, repeat BCX  - Check CT A/P w/o contrast ([facility]/renal transplant)    Leobardo Fernandez MD  Pager [phone]  After 5pm and on weekends call [phone] 53 year old male with history of pacemaker, ESRD s/p R renal transplant in 2014, DM with recent MRSA bacteremia being treated with daptomycin, presents with chills.  Was on Daptomycin until 7/10, planned through 8/1 for MRSA bacteremia  Here presents with fevers/chills, bandemia  BCX with GNR 1/4, not identified by PCR, aerobic bottle  No other focal signs to suggest cause of bacteremia--UA equivocal, UCX low CFU GNR, no abd pain, fistula clean  Overall, GNR bacteremia, prior MRSA bacteremia, bandemia  - Zosyn 3.375g q 8  - Vancomycin 1g q 24 (trough before 4th dose)  - F/U BCX, repeat BCX  - Check CT A/P w/o contrast ([facility]/renal transplant)    Leobardo Fernandez MD  Pager [phone]  After 5pm and on weekends call [phone]    I was physically present for the key portions of the evaluation and management service provided. I saw and examined the patient. I agree with the above history, physical, and plan except for any discrepancies which I have documented in “Attending Attestation.” Please refer to “Attending Attestation” for final plan.

## 2018-07-13 NOTE — PROGRESS NOTE ADULT - PROBLEM SELECTOR PLAN 6
-hold home losartan  -continue isosorbide mononitrate  -started on Hydralazine -hold home losartan  -continue isosorbide mononitrate  -hydralazine increased to 75mg, Amlodipine increased to 10mg

## 2018-07-13 NOTE — CONSULT NOTE ADULT - PROBLEM SELECTOR RECOMMENDATION 9
3 days of chills, rigors, subj fevers despite on daptomycin for MRSA bacteremia. PiCC line was not in use for several days and noted to have foul smell per patient. Recently removed, but BCx found to have GNR here. Can continue cefepime to cover for new GNR bacteremia. Continue vancomycin per his previous MRSA bacteremia.

## 2018-07-13 NOTE — CONSULT NOTE ADULT - ASSESSMENT
Mr. Wolfe is a  53 year old male with history of CAD s/p stents, pacemaker, ESRD s/p R renal transplant in 2014, HTN, DM with recent MRSA bacteremia being treated with daptomycin, now found to have GNR bacteremia currently on cefepime and vancomycin

## 2018-07-13 NOTE — PROGRESS NOTE ADULT - SUBJECTIVE AND OBJECTIVE BOX
Patient is a 53y old  Male who presents with a chief complaint of as per patient had chills when taking infusion. as per patient was feeling well x3 days. (2018 19:57)      HPI:  Patient is a 53 year old male with a PMH of CAD s/p stents in  and s/p pacemaker, MI, ESRD s/p renal transplant in , HTN, HLD, CAD, DM, presenting with a three day history of chills, rigors, and night sweats. About three weeks ago patient was hospitalized at [facility] [place] [facility] in [place] after experiencing leg weakness, and discharged home on Daptomycin 500mg IV daily via Picc line for treatment of MRSA  bacteremia. Source of infection thought to be a wound on his medial left foot received while mowing his lawn. He was tolerating his IV medication well, until Saturday when he received a new shipment from his pharmacy and upon his first infusion noticed that he felt sleepy/drowsy, as if he were drunk. He had no issues with the medication on , but noticed that about 20 minutes after receiving his dose on Monday he started experiencing extreme rigors, chills, which lasted for about 4 hours. He had a similar experience on Tuesday again, this time lasting for about 2hrs. He stated that the dose he received might be higher than that which was prescribed, but he is unsure He also reported noticing a foul smell from his picc line (now removed). Denied headache, lightheadedness, chest pain, shortness of breath, dysuria, back pain, abdominal pain , diarrhea or constipation. He endorsed some increased urinary frequency. Also endorsed decreased PO intake.    ED Course:  Patient was afebrile in the ED, Tmax 98.2, HR 60, SERGEY 133/77, RR 18, O2 sat 98% on RA UA, CXR, left ankle x-ray were obtained. No leukocytosis. Hyponatremic. Started on NS and Vancomycin. (2018 15:45)      PAST MEDICAL & SURGICAL HISTORY:  Seizure: 1 episode   Hypercholesteremia  Subdural hematoma:  treated with craniotomy/ shunt  Diabetes mellitus  HTN (hypertension)  MI (myocardial infarction)  CAD (coronary artery disease)  Kidney transplanted:  donor   Pacemaker: [employer]  History of ventricular shunt:   History of craniotomy:   H/O unilateral nephrectomy  S/P primary angioplasty with coronary stent: 4 total stents (first stent placed , one year later other 3 stents placed)      MEDICATIONS  (STANDING):  aspirin enteric coated 81 milliGRAM(s) Oral daily  atovaquone Suspension 1500 milliGRAM(s) Oral daily  cefepime   IVPB 1000 milliGRAM(s) IV Intermittent every 24 hours  clopidogrel Tablet 75 milliGRAM(s) Oral daily  dextrose 5%. 1000 milliLiter(s) (50 mL/Hr) IV Continuous <Continuous>  dextrose 5%. 1000 milliLiter(s) (50 mL/Hr) IV Continuous <Continuous>  dextrose 50% Injectable 12.5 Gram(s) IV Push once  dextrose 50% Injectable 25 Gram(s) IV Push once  dextrose 50% Injectable 25 Gram(s) IV Push once  heparin  Injectable 5000 Unit(s) SubCutaneous every 12 hours  hydrALAZINE 25 milliGRAM(s) Oral every 8 hours  insulin lispro (HumaLOG) corrective regimen sliding scale   SubCutaneous three times a day before meals  insulin lispro (HumaLOG) corrective regimen sliding scale   SubCutaneous at bedtime  isosorbide   mononitrate ER Tablet (IMDUR) 30 milliGRAM(s) Oral daily  levETIRAcetam 500 milliGRAM(s) Oral two times a day  mycophenolic acid  milliGRAM(s) Oral two times a day  pantoprazole    Tablet 40 milliGRAM(s) Oral before breakfast  tacrolimus 1.5 milliGRAM(s) Oral every 12 hours  vancomycin  IVPB 1000 milliGRAM(s) IV Intermittent every 24 hours    MEDICATIONS  (PRN):  dextrose 40% Gel 15 Gram(s) Oral once PRN Blood Glucose LESS THAN 70 milliGRAM(s)/deciliter  glucagon  Injectable 1 milliGRAM(s) IntraMuscular once PRN Glucose LESS THAN 70 milligrams/deciliter      Allergies    dapsone (Unknown)    Intolerances        VITALS:    Vital Signs Last 24 Hrs  T(C): 36.7 (2018 06:11), Max: 37.6 (2018 12:00)  T(F): 98.1 (2018 06:11), Max: 99.7 (2018 12:00)  HR: 60 (2018 06:11) (59 - 66)  BP: 186/93 (2018 06:11) (118/59 - 193/94)  BP(mean): --  RR: 18 (2018 06:11) (16 - 18)  SpO2: 98% (2018 06:11) (97% - 100%)    LABS:                          12.2   6.77  )-----------( 103      ( 2018 04:45 )             38.4       07-12    134<L>  |  100  |  26<H>  ----------------------------<  197<H>  4.7   |  20<L>  |  2.14<H>    Ca    9.5      2018 04:45  Phos  3.3     07-12  Mg     1.8     -12    TPro  7.0  /  Alb  3.3  /  TBili  0.7  /  DBili  x   /  AST  19  /  ALT  15  /  AlkPhos  59  07-12      CAPILLARY BLOOD GLUCOSE      POCT Blood Glucose.: 164 mg/dL (2018 08:50)  POCT Blood Glucose.: 226 mg/dL (2018 22:39)  POCT Blood Glucose.: 157 mg/dL (2018 17:51)          LOWER EXTREMITY PHYSICAL EXAM:    Vasular: DP/PT 1/4, B/L, CFT <3 seconds B/L   Neuro: Epicritic sensation diminished  Musculoskeletal/Ortho: no gross deformities  Skin: stable eschar over medial aspect of foot at navicular, post inflammatory skin sloughing noted around eschar, edges well adhered. Callus and stable eschar noted at distal tip of 2nd toe      RADIOLOGY & ADDITIONAL STUDIES:  < from: Xray Ankle Complete 3 Views, Left (18 @ 11:54) >  EXAM:  RAD ANKLE MIN 3 VIEWS LEFT        PROCEDURE DATE:  2018         INTERPRETATION:  TIME OF EXAM: 2018 at 11:40 AM    CLINICAL INFORMATION: Medial malleolar ulceration.    TECHNIQUE:   AP, oblique and lateral radiographs of the left ankle.    INTERPRETATION:     There is no acute fracture or dislocation. No subcutaneous air or bone   destruction.  Vascular calcification is seen.      COMPARISON:  None available      IMPRESSION:  No acute bone or joint disease.                  YEIMY LEW M.D., ATTENDING RADIOLOGIST  This document has been electronically signed. 2018 12:27PM    < end of copied text >

## 2018-07-13 NOTE — PROGRESS NOTE ADULT - ASSESSMENT
53M w/ GNR bacteremia and stable eschars L foot    -foot does not appear acutely infected at this time  -No need for dressing at this time  -abx per primary team  -no pod surgical intervention

## 2018-07-13 NOTE — PROGRESS NOTE ADULT - SUBJECTIVE AND OBJECTIVE BOX
CHIEF COMPLAINT:    Interval Events: No acute event overnight. Patient is seen and examined at the bedside this morning.     REVIEW OF SYSTEMS:  Constitutional: No fever, or chills. No recent weight loss or weight gain.   HEENT: No dry eyes or eye irritation. No postnasal drip or nasal congestion.  CV: No chest pain, or palpitations. No orthopnea.   Resp: No cough, or sputum production. No shortness of breath or dyspnea on exertion.   GI: No nausea or vomiting. No diarrhea or constipation. No abdominal pain.   : No dysuria, no nocturia or increased urinary frequency.  Musculoskeletal: No back pain, no myalgias  Skin: No rash or itchiness.   Neurological: No headache or dizziness. No syncope, no weakness, numbness.  Psychiatric: Denies depressed mood.   Endocrine: No cold or heat intolerance. No dry skin.  Hematologic/Lymphatic: No anemia or bleeding problem.     OBJECTIVE:  Vital Signs Last 24 Hrs  T(C): 37.1 (13 Jul 2018 05:39), Max: 37.1 (13 Jul 2018 05:39)  T(F): 98.7 (13 Jul 2018 05:39), Max: 98.7 (13 Jul 2018 05:39)  HR: 59 (13 Jul 2018 12:37) (59 - 60)  BP: 199/91 (13 Jul 2018 12:37) (168/92 - 199/91)  BP(mean): --  RR: 18 (13 Jul 2018 05:39) (17 - 18)  SpO2: 99% (13 Jul 2018 05:39) (99% - 100%)    07-12 @ 07:01 - 07-13 @ 07:00  --------------------------------------------------------  IN: 2015 mL / OUT: 1680 mL / NET: 335 mL    07-13 @ 07:01 - 07-13 @ 12:55  --------------------------------------------------------  IN: 0 mL / OUT: 1350 mL / NET: -1350 mL      CAPILLARY BLOOD GLUCOSE      POCT Blood Glucose.: 267 mg/dL (13 Jul 2018 12:30)      PHYSICAL EXAM:  General: Alert and cooperative. Not in acute stress. Well developed, well nourished.   Head: Normocephalic, no mass and lesions.  Eyes: Intact visual fields. PERRLA, clear conjunctiva. EOMI, no ptosis.   Throat: Oral cavity and pharynx normal. No inflammation, swelling, exudate, or lesions. Teeth and gingiva in good general condition.  Neck: No lymphadenopathy, no masses, no thyromegaly. Carotid pulses 2+. No JVD.   Respiratory: Bilateral lung clear to auscultation, no crackles, no wheezes, no rhonchi.   Cardiovascular: S1/S2 auscultated, no murmur, or gallop. Rhythm is regular. There is no peripheral edema, cyanosis or pallor. Extremities are warm and well perfused. Capillary refill is less than 2 seconds. No carotid bruits.  Abdomen: Soft, non-tender, nondistended, no guarding or rebound tenderness. Active bowel sounds in all 4 quadrants. No hepatosplenomegaly.   Musculoskeletal: Adequately aligned spine. ROM intact spine and extremities. No joint erythema or tenderness. Normal muscular development. Normal gait.   Extremities: No significant deformity or joint abnormality. Peripheral pulses intact. No varicosities. No peripheral edema, atrophy.   Skin: Intact, no rash. Normal color, texture and turgor with no lesions or eruptions.  Neurological: AOAx4. CN2-12 grosslly intact. Strength and sensation symmetric and intact throughout. Reflexes 2+ throughout. Cerebellar testing normal.  Psychiatry: The mental examination revealed the patient was oriented to person, place, and time. The patient was able to demonstrate good judgement and reason, without hallucinations, abnormal affect or abnormal behaviors during the examination. Patient is not suicidal.     LINES:    HOSPITAL MEDICATIONS:  aspirin enteric coated 81 milliGRAM(s) Oral daily  clopidogrel Tablet 75 milliGRAM(s) Oral daily  heparin  Injectable 5000 Unit(s) SubCutaneous every 12 hours    atovaquone Suspension 1500 milliGRAM(s) Oral daily  cefepime   IVPB 1000 milliGRAM(s) IV Intermittent every 24 hours  vancomycin  IVPB 1000 milliGRAM(s) IV Intermittent every 24 hours    amLODIPine   Tablet 10 milliGRAM(s) Oral daily  hydrALAZINE 75 milliGRAM(s) Oral every 8 hours  isosorbide   mononitrate ER Tablet (IMDUR) 30 milliGRAM(s) Oral daily    dextrose 40% Gel 15 Gram(s) Oral once PRN  dextrose 50% Injectable 12.5 Gram(s) IV Push once  dextrose 50% Injectable 25 Gram(s) IV Push once  dextrose 50% Injectable 25 Gram(s) IV Push once  glucagon  Injectable 1 milliGRAM(s) IntraMuscular once PRN  insulin lispro (HumaLOG) corrective regimen sliding scale   SubCutaneous three times a day before meals  insulin lispro (HumaLOG) corrective regimen sliding scale   SubCutaneous at bedtime  insulin lispro Injectable (HumaLOG) 1 Unit(s) SubCutaneous three times a day before meals      levETIRAcetam 500 milliGRAM(s) Oral two times a day    pantoprazole    Tablet 40 milliGRAM(s) Oral before breakfast        dextrose 5%. 1000 milliLiter(s) IV Continuous <Continuous>  dextrose 5%. 1000 milliLiter(s) IV Continuous <Continuous>    mycophenolic acid  milliGRAM(s) Oral two times a day  tacrolimus 1.5 milliGRAM(s) Oral every 12 hours          LABS:                        12.2   6.77  )-----------( 103      ( 12 Jul 2018 04:45 )             38.4     Hgb Trend: 12.2<--, 13.4<--  07-13    134<L>  |  100  |  23  ----------------------------<  220<H>  4.6   |  21<L>  |  2.00<H>    Ca    9.5      13 Jul 2018 05:30  Phos  3.5     07-13  Mg     1.6     07-13    TPro  7.3  /  Alb  3.3  /  TBili  0.5  /  DBili  x   /  AST  23  /  ALT  16  /  AlkPhos  58  07-13    Creatinine Trend: 2.00<--, 2.14<--, 2.20<--        Venous Blood Gas:  07-11 @ 14:00  7.38/36/27/21/45.5  VBG Lactate: 1.1      MICROBIOLOGY:     RADIOLOGY:  [ ] Reviewed and interpreted by me    EKG: CHIEF COMPLAINT: Chills and Rigors    Interval Events: No acute event overnight. Patient is seen and examined at the bedside this morning.   -feeling physical better today, and at baseline.        REVIEW OF SYSTEMS:   Constitutional: -Chills, -rigors, -drowsiness, -generalized weakness. No fever, no  	CV: No chest pain, or palpitations. No orthopnea.   	Resp: No cough, or sputum production. No shortness of breath or dyspnea on exertion.   	GI: +nausea, +vomiting. No diarrhea or constipation. No abdominal pain.   	: No dysuria, no nocturia, +increased urinary frequency.  	Musculoskeletal: No back pain, no myalgias  	Skin: No rash or itchiness, left foot  	Neurological: No headache or dizziness. No syncope, no weakness, numbness.  Psychiatric: worried about his kidney function deteriorating       OBJECTIVE:  Vital Signs Last 24 Hrs  T(C): 37.1 (13 Jul 2018 05:39), Max: 37.1 (13 Jul 2018 05:39)  T(F): 98.7 (13 Jul 2018 05:39), Max: 98.7 (13 Jul 2018 05:39)  HR: 59 (13 Jul 2018 12:37) (59 - 60)  BP: 199/91 (13 Jul 2018 12:37) (168/92 - 199/91)  BP(mean): --  RR: 18 (13 Jul 2018 05:39) (17 - 18)  SpO2: 99% (13 Jul 2018 05:39) (99% - 100%)    07-12 @ 07:01 - 07-13 @ 07:00  --------------------------------------------------------  IN: 2015 mL / OUT: 1680 mL / NET: 335 mL    07-13 @ 07:01 - 07-13 @ 12:55  --------------------------------------------------------  IN: 0 mL / OUT: 1350 mL / NET: -1350 mL      CAPILLARY BLOOD GLUCOSE      POCT Blood Glucose.: 267 mg/dL (13 Jul 2018 12:30)      PHYSICAL EXAM:  General: Alert and cooperative. Not in acute stress. Well developed, well nourished.   	Head: Normocephalic,  	Eyes: Decreased vison, clear conjunctiva. EOMI, no ptosis.   	Neck: No lymphadenopathy, no masses, no thyromegaly. Carotid pulses 2+. No JVD.   	Respiratory: Bilateral lung clear to auscultation, no crackles, no wheezes, no rhonchi.   	Cardiovascular: S1/S2 auscultated, no murmur, or gallop. Rhythm is regular. There is no peripheral edema, cyanosis or pallor. Extremities are warm and well perfused.   	Abdomen: Soft, non-tender, nondistended, no guarding or rebound tenderness. Active bowel sounds in all 4 quadrants.   	Musculoskeletal: Adequately aligned spine. ROM intact spine and extremities. Healing wound on left medial malleolus and small area of necrosis on left toe  Neurological: AOAx4. CN2-12 grosslly intact. Decreased sensation in bilateral feet, decreased      LINES:    HOSPITAL MEDICATIONS:  aspirin enteric coated 81 milliGRAM(s) Oral daily  clopidogrel Tablet 75 milliGRAM(s) Oral daily  heparin  Injectable 5000 Unit(s) SubCutaneous every 12 hours    atovaquone Suspension 1500 milliGRAM(s) Oral daily  cefepime   IVPB 1000 milliGRAM(s) IV Intermittent every 24 hours  vancomycin  IVPB 1000 milliGRAM(s) IV Intermittent every 24 hours    amLODIPine   Tablet 10 milliGRAM(s) Oral daily  hydrALAZINE 75 milliGRAM(s) Oral every 8 hours  isosorbide   mononitrate ER Tablet (IMDUR) 30 milliGRAM(s) Oral daily    dextrose 40% Gel 15 Gram(s) Oral once PRN  dextrose 50% Injectable 12.5 Gram(s) IV Push once  dextrose 50% Injectable 25 Gram(s) IV Push once  dextrose 50% Injectable 25 Gram(s) IV Push once  glucagon  Injectable 1 milliGRAM(s) IntraMuscular once PRN  insulin lispro (HumaLOG) corrective regimen sliding scale   SubCutaneous three times a day before meals  insulin lispro (HumaLOG) corrective regimen sliding scale   SubCutaneous at bedtime  insulin lispro Injectable (HumaLOG) 1 Unit(s) SubCutaneous three times a day before meals      levETIRAcetam 500 milliGRAM(s) Oral two times a day    pantoprazole    Tablet 40 milliGRAM(s) Oral before breakfast        dextrose 5%. 1000 milliLiter(s) IV Continuous <Continuous>  dextrose 5%. 1000 milliLiter(s) IV Continuous <Continuous>    mycophenolic acid  milliGRAM(s) Oral two times a day  tacrolimus 1.5 milliGRAM(s) Oral every 12 hours          LABS:                        12.2   6.77  )-----------( 103      ( 12 Jul 2018 04:45 )             38.4     Hgb Trend: 12.2<--, 13.4<--  07-13    134<L>  |  100  |  23  ----------------------------<  220<H>  4.6   |  21<L>  |  2.00<H>    Ca    9.5      13 Jul 2018 05:30  Phos  3.5     07-13  Mg     1.6     07-13    TPro  7.3  /  Alb  3.3  /  TBili  0.5  /  DBili  x   /  AST  23  /  ALT  16  /  AlkPhos  58  07-13    Creatinine Trend: 2.00<--, 2.14<--, 2.20<--        Venous Blood Gas:  07-11 @ 14:00  7.38/36/27/21/45.5  VBG Lactate: 1.1

## 2018-07-13 NOTE — CONSULT NOTE ADULT - SUBJECTIVE AND OBJECTIVE BOX
HPI:  Mr. Wolfe is a 53 year old male with history of CAD s/p stents, pacemaker, ESRD s/p R renal transplant in , HTN, DM presenting with 3 days of chills rigors and subjective fevers. About 3 weeks ago, patient was admitted at [facility] in [place] for MRSA bacteremia from an left ankle ulcer with subsequent leg weakness. Patient was on daptomycin via picc line in the hospital and was temporarily placed on oral antibiotics for his drive home to [place]. Starting 4 days prior to hospitaization here, patient used IV dapotmycin through his picc line. Immediately after infusion, patient noted feeling "drunk" with chills, rigors and subjective fevers. This occurred on both days prior to admission. Patient also mentions that PICC line had a foul smell and that it may have been clogged as per a home nurse. Denied headache, lightheadedness, chest pain, shortness of breath, dysuria, back pain, abdominal pain , diarrhea or constipation. He endorsed some increased urinary frequency. Also endorsed decreased PO intake.    ED Course:  Patient was afebrile in the ED, Tmax 98.2, HR 60, SERGEY 133/77, RR 18, O2 sat 98% on RA UA, CXR, left ankle x-ray were obtained. No leukocytosis. Hyponatremic. Started on NS and Vancomycin. (2018 15:45)  On the day of admission, the patient does note his urine was signficantly darker than usual. He reports hydrating appropriately in recent days, although his prior inpatient stay paperwork indicates he may have had heat exhaustion. Patient reports having dysuria briefly about 3 weeks prior, but currently denies dysuria.       PAST MEDICAL & SURGICAL HISTORY:  Seizure: 1 episode   Hypercholesteremia  Subdural hematoma:  treated with craniotomy/ shunt  Diabetes mellitus  HTN (hypertension)  MI (myocardial infarction)  CAD (coronary artery disease)  Kidney transplanted:  donor   Pacemaker: [employer]  History of ventricular shunt:   History of craniotomy:   H/O unilateral nephrectomy  S/P primary angioplasty with coronary stent: 4 total stents (first stent placed , one year later other 3 stents placed)      Allergies    dapsone (Unknown)    Intolerances        ANTIMICROBIALS:  atovaquone Suspension 1500 daily  vancomycin  IVPB 1000 every 24 hours      OTHER MEDS:  amLODIPine   Tablet 10 milliGRAM(s) Oral daily  aspirin enteric coated 81 milliGRAM(s) Oral daily  clopidogrel Tablet 75 milliGRAM(s) Oral daily  dextrose 40% Gel 15 Gram(s) Oral once PRN  dextrose 5%. 1000 milliLiter(s) IV Continuous <Continuous>  dextrose 5%. 1000 milliLiter(s) IV Continuous <Continuous>  dextrose 50% Injectable 12.5 Gram(s) IV Push once  dextrose 50% Injectable 25 Gram(s) IV Push once  dextrose 50% Injectable 25 Gram(s) IV Push once  glucagon  Injectable 1 milliGRAM(s) IntraMuscular once PRN  heparin  Injectable 5000 Unit(s) SubCutaneous every 12 hours  hydrALAZINE 75 milliGRAM(s) Oral every 8 hours  insulin lispro (HumaLOG) corrective regimen sliding scale   SubCutaneous three times a day before meals  insulin lispro (HumaLOG) corrective regimen sliding scale   SubCutaneous at bedtime  insulin lispro Injectable (HumaLOG) 1 Unit(s) SubCutaneous three times a day before meals  isosorbide   mononitrate ER Tablet (IMDUR) 30 milliGRAM(s) Oral daily  levETIRAcetam 500 milliGRAM(s) Oral two times a day  mycophenolic acid  milliGRAM(s) Oral two times a day  pantoprazole    Tablet 40 milliGRAM(s) Oral before breakfast  tacrolimus 1.5 milliGRAM(s) Oral every 12 hours      SOCIAL HISTORY:  Former smoker, Denies alcohol or illicit drug use. [place] [facility] [place]. Retired .      FAMILY HISTORY:  Family history of hypertension  Family history of hyperlipidemia  Family history of diabetes mellitus (Sibling)  Family history of myocardial infarction      REVIEW OF SYSTEMS  [  ] ROS unobtainable because:    [ X] All other systems negative except as noted below:	    Constitutional:  [ ] fever [X ] chills  [ ] weight loss  [ ] weakness  Skin:  [ ] rash [ ] phlebitis	  Eyes: [ ] icterus [ ] pain  [ ] discharge	  ENMT: [ ] sore throat  [ ] thrush [ ] ulcers [ ] exudates  Respiratory: [ ] dyspnea [ ] hemoptysis [ ] cough [ ] sputum	  Cardiovascular:  [ ] chest pain [ ] palpitations [ ] edema	  Gastrointestinal:  [ ] nausea [ ] vomiting [ ] diarrhea [ ] constipation [ ] pain	  Genitourinary:  [ ] dysuria [ ] frequency [ ] hematuria [ ] discharge [ ] flank pain  [ ] incontinence  Musculoskeletal:  [ ] myalgias [ ] arthralgias [ ] arthritis  [ ] back pain  Neurological:  [ ] headache [ ] seizures  [ ] confusion/altered mental status  Psychiatric:  [ ] anxiety [ ] depression	  Hematology/Lymphatics:  [ ] lymphadenopathy  Endocrine:  [ ] adrenal [ ] thyroid  Allergic/Immunologic:	 [ ] transplant [ ] seasonal    PHYSICAL EXAM:  General: [X ] non-toxic  HEAD/EYES: [ X] white sclera [ ] icterus  ENT:  [X] normal [X ] supple [ ] thrush [ ] pharyngeal exudate  Cardiovascular:   [ ] murmur [X ] normal [X ] PPM/AICD  Respiratory:  [X ] clear to ausculation bilaterally  GI:  [ X] soft, non-tender, normal bowel sounds  :  [ ] sanchez [X ] no CVA tenderness   Musculoskeletal:  [ ] no synovitis  Neurologic:  [ ] decreased sensation in bialteral lower extremities.  Skin:  [X ] healing wound in left medial malleolus and healing Picc site  Psychiatric:  [X ] appropriate affect [X ] alert & oriented  Lines:  [X] no phlebitis [ ] central line          Drug Dosing Weight  Height (cm): 170.18 (2018 19:51)  Weight (kg): 80 (2018 19:51)  BMI (kg/m2): 27.6 (2018 19:51)  BSA (m2): 1.92 (2018 19:51)    Vital Signs Last 24 Hrs  T(F): 98 (18 @ 14:34), Max: 99.7 (18 @ 12:00)    Vital Signs Last 24 Hrs  HR: 59 (18 @ 14:34) (59 - 60)  BP: 177/83 (18 @ 14:34) (168/92 - 199/91)  RR: 16 (18 @ 14:34)  SpO2: 97% (18 @ 14:34) (97% - 100%)  Wt(kg): --                          12.2   6.77  )-----------( 103      ( 2018 04:45 )             38.4           134<L>  |  100  |  23  ----------------------------<  220<H>  4.6   |  21<L>  |  2.00<H>    Ca    9.5      2018 05:30  Phos  3.5     07-  Mg     1.6         TPro  7.3  /  Alb  3.3  /  TBili  0.5  /  DBili  x   /  AST  23  /  ALT  16  /  AlkPhos  58            MICROBIOLOGY:    Culture - Blood (18 @ 12:34)    Culture - Blood:   ***Blood Panel PCR results on this specimen are available  approximately 3 hours after the Gram stain result***  Gram stain, PCR, and/or culture results may not always  correspond due to difference in methodologies  ------------------------------------------------------------  This PCR assay was performed using [employer].  The  following targets are tested for:  Enterococcus, vancomycin  resistant enterococci, Listeria monocytogenes,  coagulase  negative staphylococci, S. aureus, methicillin resistant S.  aureus, Streptococcus agalactiae (Group B), S. pneumoniae,  S. pyogenes (Group A), Acinetobacter baumannii, Enterobacter  cloacae, E. coli, Klebsiella oxytoca, K. pneumoniae, Proteus  sp., Serratia marcescens, Haemophilus influenzae, Neisseria  meningitidis, Pseudomonas aeruginosa, Candida albicans, C.  glabrata, C. krusei, C. parapsilosis, C. tropicalis and the  KPC resistance gene.  **NOTE: Due to technical problems, Proteus sp. will NOT be  reported as part of the BCID paneluntil further notice.    -  Multidrug (KPC pos) resistant organism: - NOTDETECT MAGDY    -  Staphylococcus aureus: - NOTDETECT MAGDY    -  Methicillin resistant Staphylococcus aureus (MRSA): - NOTDETECT MAGDY    -  Coagulase negative Staphylococcus: - NOTDETECT MAGDY    -  Enterococcus species: - NOTDETECT AMGDY    -  Vancomycin resistant Enterococcus sp.: - NOTDETECT MAGDY    -  Escherichia coli: - NOTDETECT MAGDY    -  Klebsiella oxytoca: - NOTDETECT MAGDY    -  Klebsiella pneumoniae: - NOTDETECT MAGDY    -  Serratia marcescens: - NOTDETECT MAGDY    -  Haemophilus influenzae: - NOTDETECT MAGDY    -  Listeria monocytogenes: - NOTDETECT MAGDY    -  Neisseria meningitidis: - NOTDETECT MAGDY    -  Pseudomonas aeruginosa: - NOTDETECT MAGDY    -  Acinetobacter baumanii: - NOTDETECT MAGDY    -  Enterobacter cloacae complex: - NOTDETECT MAGDY    -  Streptococcus sp. (Not Grp A, B or S pneumoniae): - NOTDETECT MAGDY    -  Streptococcus agalactiae (Group B): - NOTDETECT MAGDY    -  Streptococcus pyogenes (Group A): - NOTDETECT MAGDY    -  Streptococcus pneumoniae: - NOTDETECT MAGDY    -  Candida albicans: - NOTDETECT MAGDY    -  Candida glabrata: - NOTDETECT MAGDY    -  Ursula krusei: - NOTDETECT MAGDY    -  Candida parapsilosis: - NOTDETECT MAGDY    -  Candida tropicalis: - NOTDETECT MAGDY    Specimen Source: BLOOD PERIPHERAL    Organism: Gram Neg Ananth To Be Identified    Organism: BLOOD CULTURE PCR    Gram Stain Blood:   ***** CRITICAL RESULT *****  PERSON CALLED / READ-BACK: OPAL RIOS. / Y  DATE / TIME CALLED: 18 [id number]  CALLED BY: ALLYSON SPARKS^Gram Neg Rods  AFTER: 15 HOURS INCUBATION  BOTTLE: AEROBIC BOTTLE    Organism Identification: BLOOD CULTURE PCR  Gram Neg Ananth To Be Identified    Method Type: PCR    Culture - Urine (18 @ 13:05)    Culture - Urine:   GNR^Gram Neg Rods  COLONY COUNT: LESS THAN 10,000 CFU/ML    Specimen Source: URINE MIDSTREAM        RADIOLOGY:  < from: Xray Ankle Complete 3 Views, Left (18 @ 11:54) >    IMPRESSION:  No acute bone or joint disease.    < end of copied text >  < from: Xray Chest 1 View-PORTABLE IMMEDIATE (18 @ 11:54) >  IMPRESSION:  No acute pulmonary disease.    < end of copied text > HPI: Mr. Wolfe is a 53 year old male with history of CAD s/p stents, pacemaker, ESRD s/p R renal transplant in , HTN, DM presenting with 3 days of chills rigors and subjective fevers. About 3 weeks ago, patient was admitted at [facility] in [place] for MRSA bacteremia from an left ankle ulcer with subsequent leg weakness. Patient was on daptomycin via picc line in the hospital and was temporarily placed on oral antibiotics for his drive home to [place]. Starting 4 days prior to hospitaization here, patient used IV dapotmycin through his picc line. Immediately after infusion, patient noted feeling "drunk" with chills, rigors and subjective fevers. This occurred on both days prior to admission. Patient also mentions that PICC line had a foul smell and that it may have been clogged as per a home nurse. Denied headache, lightheadedness, chest pain, shortness of breath, dysuria, back pain, abdominal pain , diarrhea or constipation. He endorsed some increased urinary frequency. Also endorsed decreased PO intake.    ED Course:  Patient was afebrile in the ED, Tmax 98.2, HR 60, SERGEY 133/77, RR 18, O2 sat 98% on RA UA, CXR, left ankle x-ray were obtained. No leukocytosis. Hyponatremic. Started on NS and Vancomycin. (2018 15:45)  On the day of admission, the patient does note his urine was signficantly darker than usual. He reports hydrating appropriately in recent days, although his prior inpatient stay paperwork indicates he may have had heat exhaustion. Patient reports having dysuria briefly about 3 weeks prior, but currently denies dysuria.     Attending--Patient hx renal transplant with recent episode of MRSA bacteremia thought 2/2 to LLE wound. Patient has been on Daptomycin with planned end date of . Patient refused Dapto starting 3 days ago due to concern for AMS 2/2 dapto. Patient now presented to hospital with episode of chills/rigors/fevers. Here found to have GNR in blood. No abd pain, no N/V/D. No CP/SOB. No skin infections. No other new complaints. PICC line DCed prior to admission. Otherwise no other complaints to suggest focus.    PAST MEDICAL & SURGICAL HISTORY:  Seizure: 1 episode 2012  Hypercholesteremia  Subdural hematoma: 2012 treated with craniotomy/ shunt  Diabetes mellitus  HTN (hypertension)  MI (myocardial infarction)  CAD (coronary artery disease)  Kidney transplanted:  donor   Pacemaker: [employer]  History of ventricular shunt:   History of craniotomy:   H/O unilateral nephrectomy  S/P primary angioplasty with coronary stent: 4 total stents (first stent placed , one year later other 3 stents placed)    Allergies    dapsone (Unknown)    ANTIMICROBIALS:  atovaquone Suspension 1500 daily  vancomycin  IVPB 1000 every 24 hours    OTHER MEDS:  amLODIPine   Tablet 10 milliGRAM(s) Oral daily  aspirin enteric coated 81 milliGRAM(s) Oral daily  clopidogrel Tablet 75 milliGRAM(s) Oral daily  dextrose 40% Gel 15 Gram(s) Oral once PRN  dextrose 5%. 1000 milliLiter(s) IV Continuous <Continuous>  dextrose 5%. 1000 milliLiter(s) IV Continuous <Continuous>  dextrose 50% Injectable 12.5 Gram(s) IV Push once  dextrose 50% Injectable 25 Gram(s) IV Push once  dextrose 50% Injectable 25 Gram(s) IV Push once  glucagon  Injectable 1 milliGRAM(s) IntraMuscular once PRN  heparin  Injectable 5000 Unit(s) SubCutaneous every 12 hours  hydrALAZINE 75 milliGRAM(s) Oral every 8 hours  insulin lispro (HumaLOG) corrective regimen sliding scale   SubCutaneous three times a day before meals  insulin lispro (HumaLOG) corrective regimen sliding scale   SubCutaneous at bedtime  insulin lispro Injectable (HumaLOG) 1 Unit(s) SubCutaneous three times a day before meals  isosorbide   mononitrate ER Tablet (IMDUR) 30 milliGRAM(s) Oral daily  levETIRAcetam 500 milliGRAM(s) Oral two times a day  mycophenolic acid  milliGRAM(s) Oral two times a day  pantoprazole    Tablet 40 milliGRAM(s) Oral before breakfast  tacrolimus 1.5 milliGRAM(s) Oral every 12 hours    SOCIAL HISTORY:  Former smoker, Denies alcohol or illicit drug use. [place]. Retired .    FAMILY HISTORY:  Family history of hypertension  Family history of hyperlipidemia  Family history of diabetes mellitus (Sibling)  Family history of myocardial infarction    REVIEW OF SYSTEMS  [  ] ROS unobtainable because:    [ X] All other systems negative except as noted below:	    Constitutional:  [X] fever [X ] chills  [ ] weight loss  [ ] weakness  Skin:  [ ] rash [ ] phlebitis	  Eyes: [ ] icterus [ ] pain  [ ] discharge	  ENMT: [ ] sore throat  [ ] thrush [ ] ulcers [ ] exudates  Respiratory: [ ] dyspnea [ ] hemoptysis [ ] cough [ ] sputum	  Cardiovascular:  [ ] chest pain [ ] palpitations [ ] edema	  Gastrointestinal:  No abd pain, no N/V/D	  Genitourinary:  No dysuria, no pyuria  Musculoskeletal:  [ ] myalgias [ ] arthralgias [ ] arthritis  [ ] back pain  Neurological:  [ ] headache [ ] seizures  [ ] confusion/altered mental status  Psychiatric:  [ ] anxiety [ ] depression	  Hematology/Lymphatics:  [ ] lymphadenopathy  Endocrine:  [ ] adrenal [ ] thyroid  Allergic/Immunologic:	 [ ] transplant [ ] seasonal    PHYSICAL EXAM:  General: [X ] non-toxic  HEAD/EYES: [ X] white sclera [ ] icterus  ENT:  [X] normal [X ] supple [ ] thrush [ ] pharyngeal exudate  Cardiovascular:   [ ] murmur [X ] normal [X ] PPM/AICD  Respiratory:  [X ] clear to ausculation bilaterally  GI:  [ X] soft, non-tender, normal bowel sounds; no pain to palpation of renal transplant site  :  [ ] sanchez [X ] no CVA tenderness   Musculoskeletal:  [ ] no synovitis; LLE healed wound at medial malleolus  Neurologic:  [ ] decreased sensation in bialteral lower extremities.  Skin:  [X ] healing wound in left medial malleolus and healing Picc site; LUE fistula  Psychiatric:  [X ] appropriate affect [X ] alert & oriented  Lines:  [X] no phlebitis [ ] central line    Drug Dosing Weight  Height (cm): 170.18 (2018 19:51)  Weight (kg): 80 (2018 19:51)  BMI (kg/m2): 27.6 (2018 19:51)  BSA (m2): 1.92 (2018 19:51)    Vital Signs Last 24 Hrs  T(F): 98 (18 @ 14:34), Max: 99.7 (18 @ 12:00)    Vital Signs Last 24 Hrs  HR: 59 (18 @ 14:34) (59 - 60)  BP: 177/83 (18 @ 14:34) (168/92 - 199/91)  RR: 16 (18 @ 14:34)  SpO2: 97% (18 @ 14:34) (97% - 100%)    Labs:                        12.2   6.77  )-----------( 103      ( 2018 04:45 )             38.4     07-13    134<L>  |  100  |  23  ----------------------------<  220<H>  4.6   |  21<L>  |  2.00<H>    Ca    9.5      2018 05:30  Phos  3.5       Mg     1.6         TPro  7.3  /  Alb  3.3  /  TBili  0.5  /  DBili  x   /  AST  23  /  ALT  16  /  AlkPhos  58  13    MICROBIOLOGY:    Culture - Blood (18 @ 12:34)    Culture - Blood:     Organism: Gram Neg Ananth To Be Identified    Culture - Urine (18 @ 13:05)    Culture - Urine:   GNR^Gram Neg Rods  COLONY COUNT: LESS THAN 10,000 CFU/ML    Specimen Source: URINE MIDSTREAM    RADIOLOGY:  < from: Xray Ankle Complete 3 Views, Left (18 @ 11:54) >    IMPRESSION:  No acute bone or joint disease.    < end of copied text >  < from: Xray Chest 1 View-PORTABLE IMMEDIATE (18 @ 11:54) >  IMPRESSION:  No acute pulmonary disease.

## 2018-07-13 NOTE — PROGRESS NOTE ADULT - PROBLEM SELECTOR PLAN 3
-Continue Atovaquone, Mycophenolic acid and Tacrolimus  -F/U Tacrolimus levels  -Nephrology consulted, appreciate recs  ---Check sonogram of kidney allograft. Consider ID evaluation. Monitor labs and urine output.

## 2018-07-13 NOTE — PROGRESS NOTE ADULT - PROBLEM SELECTOR PLAN 2
The patient had a DDRT in 2014 and is on tacrolimus 1.5 mg BID and mycophenolate 360 mg BID. Continue with tacrolimus and mycophenolate and F/U tacrolimus level from AM labs

## 2018-07-14 LAB
-  AMIKACIN: SIGNIFICANT CHANGE UP
-  AMPICILLIN/SULBACTAM: SIGNIFICANT CHANGE UP
-  AMPICILLIN: SIGNIFICANT CHANGE UP
-  AZTREONAM: SIGNIFICANT CHANGE UP
-  CANDIDA ALBICANS: SIGNIFICANT CHANGE UP
-  CANDIDA GLABRATA: SIGNIFICANT CHANGE UP
-  CANDIDA KRUSEI: SIGNIFICANT CHANGE UP
-  CANDIDA PARAPSILOSIS: SIGNIFICANT CHANGE UP
-  CANDIDA TROPICALIS: SIGNIFICANT CHANGE UP
-  CEFAZOLIN: SIGNIFICANT CHANGE UP
-  CEFEPIME: SIGNIFICANT CHANGE UP
-  CEFOXITIN: SIGNIFICANT CHANGE UP
-  CEFTAZIDIME: SIGNIFICANT CHANGE UP
-  CEFTRIAXONE: SIGNIFICANT CHANGE UP
-  CIPROFLOXACIN: SIGNIFICANT CHANGE UP
-  COAGULASE NEGATIVE STAPHYLOCOCCUS: SIGNIFICANT CHANGE UP
-  ERTAPENEM: SIGNIFICANT CHANGE UP
-  GENTAMICIN: SIGNIFICANT CHANGE UP
-  IMIPENEM: SIGNIFICANT CHANGE UP
-  K. PNEUMONIAE GROUP: SIGNIFICANT CHANGE UP
-  KPC RESISTANCE GENE: SIGNIFICANT CHANGE UP
-  LEVOFLOXACIN: SIGNIFICANT CHANGE UP
-  MEROPENEM: SIGNIFICANT CHANGE UP
-  PIPERACILLIN/TAZOBACTAM: SIGNIFICANT CHANGE UP
-  STREPTOCOCCUS SP. (NOT GRP A, B OR S PNEUMONIAE): SIGNIFICANT CHANGE UP
-  TIGECYCLINE: SIGNIFICANT CHANGE UP
-  TOBRAMYCIN: SIGNIFICANT CHANGE UP
-  TRIMETHOPRIM/SULFAMETHOXAZOLE: SIGNIFICANT CHANGE UP
A BAUMANNII DNA SPEC QL NAA+PROBE: SIGNIFICANT CHANGE UP
BACTERIA BLD CULT: SIGNIFICANT CHANGE UP
BUN SERPL-MCNC: 19 MG/DL — SIGNIFICANT CHANGE UP (ref 7–23)
CALCIUM SERPL-MCNC: 9.4 MG/DL — SIGNIFICANT CHANGE UP (ref 8.4–10.5)
CHLORIDE SERPL-SCNC: 99 MMOL/L — SIGNIFICANT CHANGE UP (ref 98–107)
CK SERPL-CCNC: 21 U/L — LOW (ref 30–200)
CO2 SERPL-SCNC: 21 MMOL/L — LOW (ref 22–31)
CREAT SERPL-MCNC: 1.74 MG/DL — HIGH (ref 0.5–1.3)
E CLOAC COMP DNA BLD POS QL NAA+PROBE: SIGNIFICANT CHANGE UP
E COLI DNA BLD POS QL NAA+NON-PROBE: SIGNIFICANT CHANGE UP
ENTEROCOC DNA BLD POS QL NAA+NON-PROBE: SIGNIFICANT CHANGE UP
ENTEROCOC DNA BLD POS QL NAA+NON-PROBE: SIGNIFICANT CHANGE UP
GLUCOSE SERPL-MCNC: 245 MG/DL — HIGH (ref 70–99)
GP B STREP DNA BLD POS QL NAA+NON-PROBE: SIGNIFICANT CHANGE UP
HAEM INFLU DNA BLD POS QL NAA+NON-PROBE: SIGNIFICANT CHANGE UP
HCT VFR BLD CALC: 40.3 % — SIGNIFICANT CHANGE UP (ref 39–50)
HGB BLD-MCNC: 12.8 G/DL — LOW (ref 13–17)
K OXYTOCA DNA BLD POS QL NAA+NON-PROBE: SIGNIFICANT CHANGE UP
L MONOCYTOG DNA BLD POS QL NAA+NON-PROBE: SIGNIFICANT CHANGE UP
MAGNESIUM SERPL-MCNC: 1.7 MG/DL — SIGNIFICANT CHANGE UP (ref 1.6–2.6)
MCHC RBC-ENTMCNC: 26.2 PG — LOW (ref 27–34)
MCHC RBC-ENTMCNC: 31.8 % — LOW (ref 32–36)
MCV RBC AUTO: 82.6 FL — SIGNIFICANT CHANGE UP (ref 80–100)
METHOD TYPE: SIGNIFICANT CHANGE UP
MRSA SPEC QL CULT: SIGNIFICANT CHANGE UP
MSSA DNA SPEC QL NAA+PROBE: SIGNIFICANT CHANGE UP
N MEN ISLT CULT: SIGNIFICANT CHANGE UP
NRBC # FLD: 0 — SIGNIFICANT CHANGE UP
ORGANISM # SPEC MICROSCOPIC CNT: SIGNIFICANT CHANGE UP
ORGANISM # SPEC MICROSCOPIC CNT: SIGNIFICANT CHANGE UP
P AERUGINOSA DNA BLD POS NAA+NON-PROBE: SIGNIFICANT CHANGE UP
PHOSPHATE SERPL-MCNC: 3.4 MG/DL — SIGNIFICANT CHANGE UP (ref 2.5–4.5)
PLATELET # BLD AUTO: 121 K/UL — LOW (ref 150–400)
PMV BLD: 11.9 FL — SIGNIFICANT CHANGE UP (ref 7–13)
POTASSIUM SERPL-MCNC: 4.7 MMOL/L — SIGNIFICANT CHANGE UP (ref 3.5–5.3)
POTASSIUM SERPL-SCNC: 4.7 MMOL/L — SIGNIFICANT CHANGE UP (ref 3.5–5.3)
RBC # BLD: 4.88 M/UL — SIGNIFICANT CHANGE UP (ref 4.2–5.8)
RBC # FLD: 14.1 % — SIGNIFICANT CHANGE UP (ref 10.3–14.5)
S MARCESCENS DNA BLD POS NAA+NON-PROBE: SIGNIFICANT CHANGE UP
S PNEUM DNA BLD POS QL NAA+NON-PROBE: SIGNIFICANT CHANGE UP
S PYO DNA BLD POS QL NAA+NON-PROBE: SIGNIFICANT CHANGE UP
SODIUM SERPL-SCNC: 133 MMOL/L — LOW (ref 135–145)
SPECIMEN SOURCE: SIGNIFICANT CHANGE UP
SPECIMEN SOURCE: SIGNIFICANT CHANGE UP
WBC # BLD: 5.79 K/UL — SIGNIFICANT CHANGE UP (ref 3.8–10.5)
WBC # FLD AUTO: 5.79 K/UL — SIGNIFICANT CHANGE UP (ref 3.8–10.5)

## 2018-07-14 PROCEDURE — 99232 SBSQ HOSP IP/OBS MODERATE 35: CPT

## 2018-07-14 PROCEDURE — 99233 SBSQ HOSP IP/OBS HIGH 50: CPT | Mod: GC

## 2018-07-14 PROCEDURE — 99233 SBSQ HOSP IP/OBS HIGH 50: CPT

## 2018-07-14 RX ADMIN — MYCOPHENOLIC ACID 360 MILLIGRAM(S): 180 TABLET, DELAYED RELEASE ORAL at 18:41

## 2018-07-14 RX ADMIN — PIPERACILLIN AND TAZOBACTAM 25 GRAM(S): 4; .5 INJECTION, POWDER, LYOPHILIZED, FOR SOLUTION INTRAVENOUS at 20:06

## 2018-07-14 RX ADMIN — Medication 3: at 13:19

## 2018-07-14 RX ADMIN — MYCOPHENOLIC ACID 360 MILLIGRAM(S): 180 TABLET, DELAYED RELEASE ORAL at 05:50

## 2018-07-14 RX ADMIN — PANTOPRAZOLE SODIUM 40 MILLIGRAM(S): 20 TABLET, DELAYED RELEASE ORAL at 06:02

## 2018-07-14 RX ADMIN — TACROLIMUS 1.5 MILLIGRAM(S): 5 CAPSULE ORAL at 18:41

## 2018-07-14 RX ADMIN — Medication 75 MILLIGRAM(S): at 05:50

## 2018-07-14 RX ADMIN — Medication 1 UNIT(S): at 09:34

## 2018-07-14 RX ADMIN — LEVETIRACETAM 500 MILLIGRAM(S): 250 TABLET, FILM COATED ORAL at 18:41

## 2018-07-14 RX ADMIN — Medication 3: at 23:15

## 2018-07-14 RX ADMIN — Medication 2: at 09:34

## 2018-07-14 RX ADMIN — ATOVAQUONE 1500 MILLIGRAM(S): 750 SUSPENSION ORAL at 11:47

## 2018-07-14 RX ADMIN — Medication 1 UNIT(S): at 18:38

## 2018-07-14 RX ADMIN — AMLODIPINE BESYLATE 10 MILLIGRAM(S): 2.5 TABLET ORAL at 05:50

## 2018-07-14 RX ADMIN — Medication 75 MILLIGRAM(S): at 23:15

## 2018-07-14 RX ADMIN — HEPARIN SODIUM 5000 UNIT(S): 5000 INJECTION INTRAVENOUS; SUBCUTANEOUS at 05:50

## 2018-07-14 RX ADMIN — Medication 81 MILLIGRAM(S): at 11:47

## 2018-07-14 RX ADMIN — Medication 250 MILLIGRAM(S): at 18:39

## 2018-07-14 RX ADMIN — PIPERACILLIN AND TAZOBACTAM 25 GRAM(S): 4; .5 INJECTION, POWDER, LYOPHILIZED, FOR SOLUTION INTRAVENOUS at 05:51

## 2018-07-14 RX ADMIN — HEPARIN SODIUM 5000 UNIT(S): 5000 INJECTION INTRAVENOUS; SUBCUTANEOUS at 18:39

## 2018-07-14 RX ADMIN — Medication 1 UNIT(S): at 13:20

## 2018-07-14 RX ADMIN — TACROLIMUS 1.5 MILLIGRAM(S): 5 CAPSULE ORAL at 05:51

## 2018-07-14 RX ADMIN — Medication 75 MILLIGRAM(S): at 13:41

## 2018-07-14 RX ADMIN — Medication 3: at 18:38

## 2018-07-14 RX ADMIN — CLOPIDOGREL BISULFATE 75 MILLIGRAM(S): 75 TABLET, FILM COATED ORAL at 11:47

## 2018-07-14 RX ADMIN — LEVETIRACETAM 500 MILLIGRAM(S): 250 TABLET, FILM COATED ORAL at 05:51

## 2018-07-14 NOTE — PROGRESS NOTE ADULT - PROBLEM SELECTOR PLAN 2
The patient had a DDRT in 2014 and is on tacrolimus 1.5 mg BID and mycophenolate 720 mg BID. Continue with tacrolimus and reduced mycophenolate dose (360 mg BID) and F/U tacrolimus level from AM labs

## 2018-07-14 NOTE — PROGRESS NOTE ADULT - ASSESSMENT
53 year old male with history of pacemaker, ESRD s/p R renal transplant in 2014, DM with recent MRSA bacteremia being treated with daptomycin, planned through 8/1 but pt self- discontinued 7/10, p/w fevers, chills, bandemia found to have GNR bacteremia w no clear source.      - UA negative  - no clear abdominal source  - Zosyn 3.375g q 8  - Vancomycin 1g q 24 (trough before 4th dose)  - F/U BCX, repeat BCX  - Check CT A/P w/o contrast ([facility]/renal transplant)- pending

## 2018-07-14 NOTE — PROGRESS NOTE ADULT - SUBJECTIVE AND OBJECTIVE BOX
Patient is a 53y old  Male who presents with a chief complaint of as per patient had chills when taking infusion. as per patient was feeling well x3 days. (2018 19:57)      HPI:  Patient is a 53 year old male with a PMH of CAD s/p stents in  and s/p pacemaker, MI, ESRD s/p renal transplant in , HTN, HLD, CAD, DM, presenting with a three day history of chills, rigors, and night sweats. About three weeks ago patient was hospitalized at [facility] in [place] after experiencing leg weakness, and discharged home on Daptomycin 500mg IV daily via Picc line for treatment of MRSA  bacteremia. Source of infection thought to be a wound on his medial left foot received while mowing his lawn. He was tolerating his IV medication well, until Saturday when he received a new shipment from his pharmacy and upon his first infusion noticed that he felt sleepy/drowsy, as if he were drunk. He had no issues with the medication on , but noticed that about 20 minutes after receiving his dose on Monday he started experiencing extreme rigors, chills, which lasted for about 4 hours. He had a similar experience on Tuesday again, this time lasting for about 2hrs. He stated that the dose he received might be higher than that which was prescribed, but he is unsure He also reported noticing a foul smell from his picc line (now removed). Denied headache, lightheadedness, chest pain, shortness of breath, dysuria, back pain, abdominal pain , diarrhea or constipation. He endorsed some increased urinary frequency. Also endorsed decreased PO intake.    ED Course:  Patient was afebrile in the ED, Tmax 98.2, HR 60, SERGEY 133/77, RR 18, O2 sat 98% on RA UA, CXR, left ankle x-ray were obtained. No leukocytosis. Hyponatremic. Started on NS and Vancomycin. (2018 15:45)      PAST MEDICAL & SURGICAL HISTORY:  Seizure: 1 episode   Hypercholesteremia  Subdural hematoma:  treated with craniotomy/ shunt  Diabetes mellitus  HTN (hypertension)  MI (myocardial infarction)  CAD (coronary artery disease)  Kidney transplanted:  donor   Pacemaker: [employer]  History of ventricular shunt:   History of craniotomy:   H/O unilateral nephrectomy  S/P primary angioplasty with coronary stent: 4 total stents (first stent placed , one year later other 3 stents placed)      MEDICATIONS  (STANDING):  aspirin enteric coated 81 milliGRAM(s) Oral daily  atovaquone Suspension 1500 milliGRAM(s) Oral daily  cefepime   IVPB 1000 milliGRAM(s) IV Intermittent every 24 hours  clopidogrel Tablet 75 milliGRAM(s) Oral daily  dextrose 5%. 1000 milliLiter(s) (50 mL/Hr) IV Continuous <Continuous>  dextrose 5%. 1000 milliLiter(s) (50 mL/Hr) IV Continuous <Continuous>  dextrose 50% Injectable 12.5 Gram(s) IV Push once  dextrose 50% Injectable 25 Gram(s) IV Push once  dextrose 50% Injectable 25 Gram(s) IV Push once  heparin  Injectable 5000 Unit(s) SubCutaneous every 12 hours  hydrALAZINE 25 milliGRAM(s) Oral every 8 hours  insulin lispro (HumaLOG) corrective regimen sliding scale   SubCutaneous three times a day before meals  insulin lispro (HumaLOG) corrective regimen sliding scale   SubCutaneous at bedtime  isosorbide   mononitrate ER Tablet (IMDUR) 30 milliGRAM(s) Oral daily  levETIRAcetam 500 milliGRAM(s) Oral two times a day  mycophenolic acid  milliGRAM(s) Oral two times a day  pantoprazole    Tablet 40 milliGRAM(s) Oral before breakfast  tacrolimus 1.5 milliGRAM(s) Oral every 12 hours  vancomycin  IVPB 1000 milliGRAM(s) IV Intermittent every 24 hours    MEDICATIONS  (PRN):  dextrose 40% Gel 15 Gram(s) Oral once PRN Blood Glucose LESS THAN 70 milliGRAM(s)/deciliter  glucagon  Injectable 1 milliGRAM(s) IntraMuscular once PRN Glucose LESS THAN 70 milligrams/deciliter      Allergies    dapsone (Unknown)    Intolerances        VITALS:    Vital Signs Last 24 Hrs  T(C): 36.7 (2018 06:11), Max: 37.6 (2018 12:00)  T(F): 98.1 (2018 06:11), Max: 99.7 (2018 12:00)  HR: 60 (2018 06:11) (59 - 66)  BP: 186/93 (2018 06:11) (118/59 - 193/94)  BP(mean): --  RR: 18 (2018 06:11) (16 - 18)  SpO2: 98% (2018 06:11) (97% - 100%)    LABS:                          12.2   6.77  )-----------( 103      ( 2018 04:45 )             38.4       07-12    134<L>  |  100  |  26<H>  ----------------------------<  197<H>  4.7   |  20<L>  |  2.14<H>    Ca    9.5      2018 04:45  Phos  3.3     07-12  Mg     1.8     -12    TPro  7.0  /  Alb  3.3  /  TBili  0.7  /  DBili  x   /  AST  19  /  ALT  15  /  AlkPhos  59  07-12      CAPILLARY BLOOD GLUCOSE      POCT Blood Glucose.: 164 mg/dL (2018 08:50)  POCT Blood Glucose.: 226 mg/dL (2018 22:39)  POCT Blood Glucose.: 157 mg/dL (2018 17:51)          LOWER EXTREMITY PHYSICAL EXAM:    Vasular: DP/PT 1/4, B/L, CFT <3 seconds B/L   Neuro: Epicritic sensation diminished  Musculoskeletal/Ortho: no gross deformities  Skin: stable eschar over medial aspect of foot at navicular, post inflammatory skin sloughing noted around eschar, edges well adhered. Callus and stable eschar noted at distal tip of 2nd toe      RADIOLOGY & ADDITIONAL STUDIES:  < from: Xray Ankle Complete 3 Views, Left (18 @ 11:54) >  EXAM:  RAD ANKLE MIN 3 VIEWS LEFT        PROCEDURE DATE:  2018         INTERPRETATION:  TIME OF EXAM: 2018 at 11:40 AM    CLINICAL INFORMATION: Medial malleolar ulceration.    TECHNIQUE:   AP, oblique and lateral radiographs of the left ankle.    INTERPRETATION:     There is no acute fracture or dislocation. No subcutaneous air or bone   destruction.  Vascular calcification is seen.      COMPARISON:  None available      IMPRESSION:  No acute bone or joint disease.                  YEIMY LEW M.D., ATTENDING RADIOLOGIST  This document has been electronically signed. 2018 12:27PM    < end of copied text >

## 2018-07-14 NOTE — PROGRESS NOTE ADULT - PROBLEM SELECTOR PLAN 1
-Possible sources of infection include medial malleolus wound and picc line  -Started on vanco in the ED; continue. Start on cefepime; both renally dosed   -UA not concerning for a UTI  -Osteomyelitis unlikely given negative X-ray   -F/U Blood and Urine cultures  -Podiatry consulted for foot wounds, recs as below  ---foot does not appear acutely infected at this time, no need for dressing at this time  -ID consulted per nephrology's recommendation -Possible sources of infection include medial malleolus wound and picc line  -Started on vanco in the ED; continue. Start on cefepime; both renally dosed   -UA not concerning for a UTI  -Osteomyelitis unlikely given negative X-ray   -F/U Blood and Urine cultures  -Podiatry consulted for foot wounds, recs as below  ---foot does not appear acutely infected at this time, no need for dressing at this time  -ID consulted per nephrology's recommendation, Cefepime switched to Zosyn (7/13- ) and continuing with Vanc (7/11- )

## 2018-07-14 NOTE — PROGRESS NOTE ADULT - SUBJECTIVE AND OBJECTIVE BOX
[facility] DIVISION OF KIDNEY DISEASES AND HYPERTENSION -- FOLLOW UP NOTE  --------------------------------------------------------------------------------  HPI: 53 year old man with a history of DDRT (2/2014 on tacrolimus 1.5 mg BID and mycophenolate 360 mg BID), DM2, HTN, CAD s/p PCI, left nephrectomy, subdural hematoma s/p craniotomy (prior to 2014), seizure disorder, presented to the hospital with 3 days of chills, poor PO intake, and decreased vision. Pt. noted to have elevated Scr of 2.2 on labs in ER. Today his creatinine improved to 2.14. His baseline Scr ranges from 1.3-1.5.     Patient gives history of headache, chills, nausea and "cola" colored urine for the past 3 days. He has had decreased PO intake and has only been drinking some water. 3 weeks ago he was admitted to the hospital in [place] for bacteremia associated with a wound on the medial aspect of his left foot. He was discharged with IV Daptomycin and has been on it for two weeks at home with the assistance of a visiting nurse. This past week his PICC line was found to be occluded and the visiting nurse removed it. Pt. says that he saw his transplant nephrologist Dr. Miller on 7/2/18. Scr was 1.3 on labs done on 7/2/18.    Today the patient was seen and examined at bedside. He feels improved overall, now without fevers and chills overnight. He has intermittent headaches. Denies shortness of breath, chest pain, nausea, vomiting, and diarrhea.  PAST HISTORY  --------------------------------------------------------------------------------  No significant changes to PMH, PSH, FHx, SHx, unless otherwise noted    ALLERGIES & MEDICATIONS  --------------------------------------------------------------------------------  Allergies    dapsone (Unknown)    Intolerances    Standing Inpatient Medications  amLODIPine   Tablet 10 milliGRAM(s) Oral daily  aspirin enteric coated 81 milliGRAM(s) Oral daily  atovaquone Suspension 1500 milliGRAM(s) Oral daily  clopidogrel Tablet 75 milliGRAM(s) Oral daily  dextrose 5%. 1000 milliLiter(s) IV Continuous <Continuous>  dextrose 5%. 1000 milliLiter(s) IV Continuous <Continuous>  dextrose 50% Injectable 12.5 Gram(s) IV Push once  dextrose 50% Injectable 25 Gram(s) IV Push once  dextrose 50% Injectable 25 Gram(s) IV Push once  heparin  Injectable 5000 Unit(s) SubCutaneous every 12 hours  hydrALAZINE 75 milliGRAM(s) Oral every 8 hours  insulin lispro (HumaLOG) corrective regimen sliding scale   SubCutaneous three times a day before meals  insulin lispro (HumaLOG) corrective regimen sliding scale   SubCutaneous at bedtime  insulin lispro Injectable (HumaLOG) 1 Unit(s) SubCutaneous three times a day before meals  isosorbide   mononitrate ER Tablet (IMDUR) 60 milliGRAM(s) Oral daily  levETIRAcetam 500 milliGRAM(s) Oral two times a day  mycophenolic acid  milliGRAM(s) Oral two times a day  pantoprazole    Tablet 40 milliGRAM(s) Oral before breakfast  piperacillin/tazobactam IVPB. 3.375 Gram(s) IV Intermittent every 12 hours  tacrolimus 1.5 milliGRAM(s) Oral every 12 hours  vancomycin  IVPB 1000 milliGRAM(s) IV Intermittent every 24 hours    PRN Inpatient Medications  dextrose 40% Gel 15 Gram(s) Oral once PRN  glucagon  Injectable 1 milliGRAM(s) IntraMuscular once PRN      REVIEW OF SYSTEMS  --------------------------------------------------------------------------------  Gen: + lethargy, no fever  Respiratory: No dyspnea  CV: No chest pain  GI: No abdominal pain  MSK: No LE edema  Neuro: No dizziness  Heme: No bleeding    All other systems were reviewed and are negative, except as noted.    VITALS/PHYSICAL EXAM  --------------------------------------------------------------------------------  T(C): 36.8 (07-14-18 @ 05:47), Max: 37 (07-13-18 @ 21:21)  HR: 60 (07-14-18 @ 11:44) (59 - 60)  BP: 136/75 (07-14-18 @ 11:44) (136/75 - 178/91)  RR: 18 (07-14-18 @ 11:44) (16 - 18)  SpO2: 100% (07-14-18 @ 11:44) (97% - 100%)  Wt(kg): --    07-13-18 @ 07:01  -  07-14-18 @ 07:00  --------------------------------------------------------  IN: 165 mL / OUT: 2150 mL / NET: -1985 mL    07-14-18 @ 07:01  -  07-14-18 @ 12:40  --------------------------------------------------------  IN: 0 mL / OUT: 400 mL / NET: -400 mL    Physical Exam:  Gen: resting, NAD  	HEENT: No JVD  	Pulm: CTA B/L  	CV: S1S2+  	Abd: Soft, +BS   	Ext:  Healing wound on the medial aspect of the left foot+, No LE edema B/L  	Neuro: Awake  	Skin: Warm and dry  	Vascular access: LUE AVF: +thrill  LABS/STUDIES  --------------------------------------------------------------------------------              12.8   5.79  >-----------<  121      [07-14-18 @ 05:55]              40.3     133  |  99  |  19  ----------------------------<  245      [07-14-18 @ 05:55]  4.7   |  21  |  1.74        Ca     9.4     [07-14-18 @ 05:55]      Mg     1.7     [07-14-18 @ 05:55]      Phos  3.4     [07-14-18 @ 05:55]    TPro  7.3  /  Alb  3.3  /  TBili  0.5  /  DBili  x   /  AST  23  /  ALT  16  /  AlkPhos  58  [07-13-18 @ 05:30]    CK 21      [07-14-18 @ 05:55]    Creatinine Trend:  SCr 1.74 [07-14 @ 05:55]  SCr 2.00 [07-13 @ 05:30]  SCr 2.14 [07-12 @ 04:45]  SCr 2.20 [07-11 @ 12:00]

## 2018-07-14 NOTE — PROGRESS NOTE ADULT - SUBJECTIVE AND OBJECTIVE BOX
CHIEF COMPLAINT:    Interval Events: No acute event overnight. Patient is seen and examined at the bedside this morning.     REVIEW OF SYSTEMS:  Constitutional: No fever, or chills. No recent weight loss or weight gain.   HEENT: No dry eyes or eye irritation. No postnasal drip or nasal congestion.  CV: No chest pain, or palpitations. No orthopnea.   Resp: No cough, or sputum production. No shortness of breath or dyspnea on exertion.   GI: No nausea or vomiting. No diarrhea or constipation. No abdominal pain.   : No dysuria, no nocturia or increased urinary frequency.  Musculoskeletal: No back pain, no myalgias  Skin: No rash or itchiness.   Neurological: No headache or dizziness. No syncope, no weakness, numbness.  Psychiatric: Denies depressed mood.   Endocrine: No cold or heat intolerance. No dry skin.  Hematologic/Lymphatic: No anemia or bleeding problem.     OBJECTIVE:  Vital Signs Last 24 Hrs  T(C): 36.8 (14 Jul 2018 05:47), Max: 37 (13 Jul 2018 21:21)  T(F): 98.2 (14 Jul 2018 05:47), Max: 98.6 (13 Jul 2018 21:21)  HR: 60 (14 Jul 2018 05:47) (59 - 60)  BP: 178/91 (14 Jul 2018 05:47) (143/76 - 199/91)  BP(mean): --  RR: 17 (14 Jul 2018 05:47) (16 - 18)  SpO2: 100% (14 Jul 2018 05:47) (97% - 100%)    07-13 @ 07:01  -  07-14 @ 07:00  --------------------------------------------------------  IN: 165 mL / OUT: 2150 mL / NET: -1985 mL      CAPILLARY BLOOD GLUCOSE      POCT Blood Glucose.: 241 mg/dL (13 Jul 2018 22:06)      PHYSICAL EXAM:  General: Alert and cooperative. Not in acute stress. Well developed, well nourished.   Head: Normocephalic, no mass and lesions.  Eyes: Intact visual fields. PERRLA, clear conjunctiva. EOMI, no ptosis.   Throat: Oral cavity and pharynx normal. No inflammation, swelling, exudate, or lesions. Teeth and gingiva in good general condition.  Neck: No lymphadenopathy, no masses, no thyromegaly. Carotid pulses 2+. No JVD.   Respiratory: Bilateral lung clear to auscultation, no crackles, no wheezes, no rhonchi.   Cardiovascular: S1/S2 auscultated, no murmur, or gallop. Rhythm is regular. There is no peripheral edema, cyanosis or pallor. Extremities are warm and well perfused. Capillary refill is less than 2 seconds. No carotid bruits.  Abdomen: Soft, non-tender, nondistended, no guarding or rebound tenderness. Active bowel sounds in all 4 quadrants. No hepatosplenomegaly.   Musculoskeletal: Adequately aligned spine. ROM intact spine and extremities. No joint erythema or tenderness. Normal muscular development. Normal gait.   Extremities: No significant deformity or joint abnormality. Peripheral pulses intact. No varicosities. No peripheral edema, atrophy.   Skin: Intact, no rash. Normal color, texture and turgor with no lesions or eruptions.  Neurological: AOAx4. CN2-12 grosslly intact. Strength and sensation symmetric and intact throughout. Reflexes 2+ throughout. Cerebellar testing normal.  Psychiatry: The mental examination revealed the patient was oriented to person, place, and time. The patient was able to demonstrate good judgement and reason, without hallucinations, abnormal affect or abnormal behaviors during the examination. Patient is not suicidal.     LINES:    HOSPITAL MEDICATIONS:  aspirin enteric coated 81 milliGRAM(s) Oral daily  clopidogrel Tablet 75 milliGRAM(s) Oral daily  heparin  Injectable 5000 Unit(s) SubCutaneous every 12 hours    atovaquone Suspension 1500 milliGRAM(s) Oral daily  piperacillin/tazobactam IVPB. 3.375 Gram(s) IV Intermittent every 12 hours  vancomycin  IVPB 1000 milliGRAM(s) IV Intermittent every 24 hours    amLODIPine   Tablet 10 milliGRAM(s) Oral daily  hydrALAZINE 75 milliGRAM(s) Oral every 8 hours  isosorbide   mononitrate ER Tablet (IMDUR) 60 milliGRAM(s) Oral daily    dextrose 40% Gel 15 Gram(s) Oral once PRN  dextrose 50% Injectable 12.5 Gram(s) IV Push once  dextrose 50% Injectable 25 Gram(s) IV Push once  dextrose 50% Injectable 25 Gram(s) IV Push once  glucagon  Injectable 1 milliGRAM(s) IntraMuscular once PRN  insulin lispro (HumaLOG) corrective regimen sliding scale   SubCutaneous three times a day before meals  insulin lispro (HumaLOG) corrective regimen sliding scale   SubCutaneous at bedtime  insulin lispro Injectable (HumaLOG) 1 Unit(s) SubCutaneous three times a day before meals      levETIRAcetam 500 milliGRAM(s) Oral two times a day    pantoprazole    Tablet 40 milliGRAM(s) Oral before breakfast        dextrose 5%. 1000 milliLiter(s) IV Continuous <Continuous>  dextrose 5%. 1000 milliLiter(s) IV Continuous <Continuous>    mycophenolic acid  milliGRAM(s) Oral two times a day  tacrolimus 1.5 milliGRAM(s) Oral every 12 hours          LABS:                        12.8   5.79  )-----------( 121      ( 14 Jul 2018 05:55 )             40.3     Hgb Trend: 12.8<--, 12.2<--, 13.4<--  07-14    133<L>  |  99  |  19  ----------------------------<  245<H>  4.7   |  21<L>  |  1.74<H>    Ca    9.4      14 Jul 2018 05:55  Phos  3.4     07-14  Mg     1.7     07-14    TPro  7.3  /  Alb  3.3  /  TBili  0.5  /  DBili  x   /  AST  23  /  ALT  16  /  AlkPhos  58  07-13    Creatinine Trend: 1.74<--, 2.00<--, 2.14<--, 2.20<--            MICROBIOLOGY:     RADIOLOGY:  [ ] Reviewed and interpreted by me    EKG: CHIEF COMPLAINT: Chills and Rigors    Interval Events: No acute event overnight. Patient is seen and examined at the bedside this morning.   -feeling physical better today    REVIEW OF SYSTEMS:   Constitutional: -Chills, -rigors, -drowsiness, -generalized weakness. No fever, no  	CV: No chest pain, or palpitations. No orthopnea.   	Resp: No cough, or sputum production. No shortness of breath or dyspnea on exertion.   	GI: -nausea, -vomiting. No diarrhea or constipation. No abdominal pain.   	: No dysuria, no nocturia, +increased urinary frequency.  	Musculoskeletal: No back pain, no myalgias  	Skin: No rash or itchiness, left foot  	Neurological: No headache or dizziness. No syncope, no weakness, numbness.  Psychiatric: worried about his kidney function deteriorating     OBJECTIVE:  Vital Signs Last 24 Hrs  T(C): 36.8 (14 Jul 2018 05:47), Max: 37 (13 Jul 2018 21:21)  T(F): 98.2 (14 Jul 2018 05:47), Max: 98.6 (13 Jul 2018 21:21)  HR: 60 (14 Jul 2018 05:47) (59 - 60)  BP: 178/91 (14 Jul 2018 05:47) (143/76 - 199/91)  BP(mean): --  RR: 17 (14 Jul 2018 05:47) (16 - 18)  SpO2: 100% (14 Jul 2018 05:47) (97% - 100%)    07-13 @ 07:01  -  07-14 @ 07:00  --------------------------------------------------------  IN: 165 mL / OUT: 2150 mL / NET: -1985 mL      CAPILLARY BLOOD GLUCOSE      POCT Blood Glucose.: 241 mg/dL (13 Jul 2018 22:06)      PHYSICAL EXAM:  General: Alert and cooperative. Not in acute stress. Well developed, well nourished.   	Head: Normocephalic,  	Eyes: Decreased vison, clear conjunctiva. EOMI, no ptosis.   	Neck: No lymphadenopathy, no masses, no thyromegaly.   	Respiratory: Bilateral lung clear to auscultation, no crackles, no wheezes, no rhonchi.   	Cardiovascular: S1/S2 auscultated, no murmur, or gallop. Rhythm is regular. There is no peripheral edema, cyanosis or pallor. Extremities are warm and well perfused.   	Abdomen: Soft, non-tender, nondistended, no guarding or rebound tenderness. Active bowel sounds in all 4 quadrants.   	Musculoskeletal: Adequately aligned spine. ROM intact spine and extremities. Healing wound on left medial malleolus and small area of necrosis on left toe  Neurological: AOAx4. CN2-12 grosslly intact. Decreased sensation in bilateral feet  LINES:    HOSPITAL MEDICATIONS:  aspirin enteric coated 81 milliGRAM(s) Oral daily  clopidogrel Tablet 75 milliGRAM(s) Oral daily  heparin  Injectable 5000 Unit(s) SubCutaneous every 12 hours    atovaquone Suspension 1500 milliGRAM(s) Oral daily  piperacillin/tazobactam IVPB. 3.375 Gram(s) IV Intermittent every 12 hours  vancomycin  IVPB 1000 milliGRAM(s) IV Intermittent every 24 hours    amLODIPine   Tablet 10 milliGRAM(s) Oral daily  hydrALAZINE 75 milliGRAM(s) Oral every 8 hours  isosorbide   mononitrate ER Tablet (IMDUR) 60 milliGRAM(s) Oral daily    dextrose 40% Gel 15 Gram(s) Oral once PRN  dextrose 50% Injectable 12.5 Gram(s) IV Push once  dextrose 50% Injectable 25 Gram(s) IV Push once  dextrose 50% Injectable 25 Gram(s) IV Push once  glucagon  Injectable 1 milliGRAM(s) IntraMuscular once PRN  insulin lispro (HumaLOG) corrective regimen sliding scale   SubCutaneous three times a day before meals  insulin lispro (HumaLOG) corrective regimen sliding scale   SubCutaneous at bedtime  insulin lispro Injectable (HumaLOG) 1 Unit(s) SubCutaneous three times a day before meals      levETIRAcetam 500 milliGRAM(s) Oral two times a day    pantoprazole    Tablet 40 milliGRAM(s) Oral before breakfast        dextrose 5%. 1000 milliLiter(s) IV Continuous <Continuous>  dextrose 5%. 1000 milliLiter(s) IV Continuous <Continuous>    mycophenolic acid  milliGRAM(s) Oral two times a day  tacrolimus 1.5 milliGRAM(s) Oral every 12 hours          LABS:                        12.8   5.79  )-----------( 121      ( 14 Jul 2018 05:55 )             40.3     Hgb Trend: 12.8<--, 12.2<--, 13.4<--  07-14    133<L>  |  99  |  19  ----------------------------<  245<H>  4.7   |  21<L>  |  1.74<H>    Ca    9.4      14 Jul 2018 05:55  Phos  3.4     07-14  Mg     1.7     07-14    TPro  7.3  /  Alb  3.3  /  TBili  0.5  /  DBili  x   /  AST  23  /  ALT  16  /  AlkPhos  58  07-13    Creatinine Trend: 1.74<--, 2.00<--, 2.14<--, 2.20<--            MICROBIOLOGY:     RADIOLOGY:  [ ] Reviewed and interpreted by me    EKG:

## 2018-07-14 NOTE — PROGRESS NOTE ADULT - PROBLEM SELECTOR PLAN 6
-hold home losartan  -continue isosorbide mononitrate  -hydralazine increased to 75mg, Amlodipine increased to 10mg -hold home losartan  -continue isosorbide mononitrate 60mg; will increase to 90 if BP not well controlled  -hydralazine increased to 75mg, Amlodipine increased to 10mg

## 2018-07-14 NOTE — PROGRESS NOTE ADULT - SUBJECTIVE AND OBJECTIVE BOX
Follow Up:  Overnight no events. Afebrile overnight. Pt feels well. Offers no complaints. Wants to go homel explained he has a serious infection.       ROS:    All other systems negative    Constitutional: no fever, no chills  Head: no trauma  Eyes: no vision changes, no eye pain  ENT:  no sore throat, no rhinorrhea  Cardiovascular:  no chest pain, no palpitation  Respiratory:  no SOB, no cough  GI:  no abd pain, no vomiting, no diarrhea  urinary: no dysuria, no hematuria, no flank pain  musculoskeletal:  no joint pain, no joint swelling  skin:  no rash  neurology:  no headache, no seizure, no change in mental status  psych: no anxiety, no depression       Allergies  dapsone (Unknown)        ANTIMICROBIALS:  atovaquone Suspension 1500 daily  piperacillin/tazobactam IVPB. 3.375 every 12 hours  vancomycin  IVPB 1000 every 24 hours      OTHER MEDS:  amLODIPine   Tablet 10 milliGRAM(s) Oral daily  aspirin enteric coated 81 milliGRAM(s) Oral daily  clopidogrel Tablet 75 milliGRAM(s) Oral daily  dextrose 40% Gel 15 Gram(s) Oral once PRN  dextrose 5%. 1000 milliLiter(s) IV Continuous <Continuous>  dextrose 5%. 1000 milliLiter(s) IV Continuous <Continuous>  dextrose 50% Injectable 12.5 Gram(s) IV Push once  dextrose 50% Injectable 25 Gram(s) IV Push once  dextrose 50% Injectable 25 Gram(s) IV Push once  glucagon  Injectable 1 milliGRAM(s) IntraMuscular once PRN  heparin  Injectable 5000 Unit(s) SubCutaneous every 12 hours  hydrALAZINE 75 milliGRAM(s) Oral every 8 hours  insulin lispro (HumaLOG) corrective regimen sliding scale   SubCutaneous three times a day before meals  insulin lispro (HumaLOG) corrective regimen sliding scale   SubCutaneous at bedtime  insulin lispro Injectable (HumaLOG) 1 Unit(s) SubCutaneous three times a day before meals  isosorbide   mononitrate ER Tablet (IMDUR) 60 milliGRAM(s) Oral daily  levETIRAcetam 500 milliGRAM(s) Oral two times a day  mycophenolic acid  milliGRAM(s) Oral two times a day  pantoprazole    Tablet 40 milliGRAM(s) Oral before breakfast  tacrolimus 1.5 milliGRAM(s) Oral every 12 hours      Vital Signs Last 24 Hrs  T(C): 36.8 (14 Jul 2018 05:47), Max: 37 (13 Jul 2018 21:21)  T(F): 98.2 (14 Jul 2018 05:47), Max: 98.6 (13 Jul 2018 21:21)  HR: 60 (14 Jul 2018 05:47) (59 - 60)  BP: 178/91 (14 Jul 2018 05:47) (143/76 - 199/91)  BP(mean): --  RR: 17 (14 Jul 2018 05:47) (16 - 18)  SpO2: 100% (14 Jul 2018 05:47) (97% - 100%)    Physical Exam:  General:    NAD,  non toxic, A&O x 3  Head: atraumatic, normocephalic  Eye: normal sclera and conjunctiva  ENT:    no oropharyngeal lesions,   no LAD,   neck supple  Cardio:     regular S1, S2,  no murmur  Respiratory:    clear b/l,    no wheezing  abd:     soft,   BS +,   no tenderness,    no organomegaly  :   no CVAT,  no suprapubic tenderness,   no  sanchez  Musculoskeletal:   no joint swelling,   no edema  vascular: no lines, normal pulses  Skin:    no rash  Neurologic:     no focal deficit  psych: normal affect, no suicidal ideation                          12.8   5.79  )-----------( 121      ( 14 Jul 2018 05:55 )             40.3       07-14    133<L>  |  99  |  19  ----------------------------<  245<H>  4.7   |  21<L>  |  1.74<H>    Ca    9.4      14 Jul 2018 05:55  Phos  3.4     07-14  Mg     1.7     07-14    TPro  7.3  /  Alb  3.3  /  TBili  0.5  /  DBili  x   /  AST  23  /  ALT  16  /  AlkPhos  58  07-13          MICROBIOLOGY:  Vancomycin Level, Trough: 11.0 ug/mL (07-13-18 @ 14:31)  v  URINE MIDSTREAM  07-11-18 --  --  --  UA negative    BLOOD PERIPHERAL  07-11-18 --  --  BLOOD CULTURE PCR  Gram Neg Ananth To Be Identified      RADIOLOGY:  < from: Xray Ankle Complete 3 Views, Left (07.11.18 @ 11:54) >    IMPRESSION:  No acute bone or joint disease.    < end of copied text >  < from: Xray Chest 1 View-PORTABLE IMMEDIATE (07.11.18 @ 11:54) >  IMPRESSION:  No acute pulmonary disease.

## 2018-07-15 DIAGNOSIS — Z71.89 OTHER SPECIFIED COUNSELING: ICD-10-CM

## 2018-07-15 LAB
APPEARANCE UR: CLEAR — SIGNIFICANT CHANGE UP
B PERT DNA SPEC QL NAA+PROBE: SIGNIFICANT CHANGE UP
BILIRUB UR-MCNC: NEGATIVE — SIGNIFICANT CHANGE UP
BLOOD UR QL VISUAL: HIGH
BUN SERPL-MCNC: 22 MG/DL — SIGNIFICANT CHANGE UP (ref 7–23)
C PNEUM DNA SPEC QL NAA+PROBE: NOT DETECTED — SIGNIFICANT CHANGE UP
CALCIUM SERPL-MCNC: 9.5 MG/DL — SIGNIFICANT CHANGE UP (ref 8.4–10.5)
CHLORIDE SERPL-SCNC: 95 MMOL/L — LOW (ref 98–107)
CO2 SERPL-SCNC: 18 MMOL/L — LOW (ref 22–31)
COLOR SPEC: YELLOW — SIGNIFICANT CHANGE UP
CREAT ?TM UR-MCNC: 67.9 MG/DL — SIGNIFICANT CHANGE UP
CREAT SERPL-MCNC: 1.9 MG/DL — HIGH (ref 0.5–1.3)
FLUAV H1 2009 PAND RNA SPEC QL NAA+PROBE: NOT DETECTED — SIGNIFICANT CHANGE UP
FLUAV H1 RNA SPEC QL NAA+PROBE: NOT DETECTED — SIGNIFICANT CHANGE UP
FLUAV H3 RNA SPEC QL NAA+PROBE: NOT DETECTED — SIGNIFICANT CHANGE UP
FLUAV SUBTYP SPEC NAA+PROBE: SIGNIFICANT CHANGE UP
FLUBV RNA SPEC QL NAA+PROBE: NOT DETECTED — SIGNIFICANT CHANGE UP
GLUCOSE SERPL-MCNC: 236 MG/DL — HIGH (ref 70–99)
GLUCOSE UR-MCNC: 1000 — SIGNIFICANT CHANGE UP
HADV DNA SPEC QL NAA+PROBE: NOT DETECTED — SIGNIFICANT CHANGE UP
HCOV 229E RNA SPEC QL NAA+PROBE: NOT DETECTED — SIGNIFICANT CHANGE UP
HCOV HKU1 RNA SPEC QL NAA+PROBE: NOT DETECTED — SIGNIFICANT CHANGE UP
HCOV NL63 RNA SPEC QL NAA+PROBE: NOT DETECTED — SIGNIFICANT CHANGE UP
HCOV OC43 RNA SPEC QL NAA+PROBE: NOT DETECTED — SIGNIFICANT CHANGE UP
HCT VFR BLD CALC: 42 % — SIGNIFICANT CHANGE UP (ref 39–50)
HGB BLD-MCNC: 13.1 G/DL — SIGNIFICANT CHANGE UP (ref 13–17)
HMPV RNA SPEC QL NAA+PROBE: NOT DETECTED — SIGNIFICANT CHANGE UP
HPIV1 RNA SPEC QL NAA+PROBE: NOT DETECTED — SIGNIFICANT CHANGE UP
HPIV2 RNA SPEC QL NAA+PROBE: NOT DETECTED — SIGNIFICANT CHANGE UP
HPIV3 RNA SPEC QL NAA+PROBE: NOT DETECTED — SIGNIFICANT CHANGE UP
HPIV4 RNA SPEC QL NAA+PROBE: NOT DETECTED — SIGNIFICANT CHANGE UP
KETONES UR-MCNC: NEGATIVE — SIGNIFICANT CHANGE UP
LEUKOCYTE ESTERASE UR-ACNC: NEGATIVE — SIGNIFICANT CHANGE UP
M PNEUMO DNA SPEC QL NAA+PROBE: NOT DETECTED — SIGNIFICANT CHANGE UP
MCHC RBC-ENTMCNC: 25.7 PG — LOW (ref 27–34)
MCHC RBC-ENTMCNC: 31.2 % — LOW (ref 32–36)
MCV RBC AUTO: 82.5 FL — SIGNIFICANT CHANGE UP (ref 80–100)
MUCOUS THREADS # UR AUTO: SIGNIFICANT CHANGE UP
NITRITE UR-MCNC: NEGATIVE — SIGNIFICANT CHANGE UP
NON-SQ EPI CELLS # UR AUTO: <1 — SIGNIFICANT CHANGE UP
NRBC # FLD: 0 — SIGNIFICANT CHANGE UP
OSMOLALITY UR: 313 MOSMO/KG — SIGNIFICANT CHANGE UP (ref 50–1200)
PH UR: 5.5 — SIGNIFICANT CHANGE UP (ref 4.6–8)
PLATELET # BLD AUTO: 123 K/UL — LOW (ref 150–400)
PMV BLD: 12.4 FL — SIGNIFICANT CHANGE UP (ref 7–13)
POTASSIUM SERPL-MCNC: 4.4 MMOL/L — SIGNIFICANT CHANGE UP (ref 3.5–5.3)
POTASSIUM SERPL-SCNC: 4.4 MMOL/L — SIGNIFICANT CHANGE UP (ref 3.5–5.3)
PROT UR-MCNC: 30 MG/DL — HIGH
RBC # BLD: 5.09 M/UL — SIGNIFICANT CHANGE UP (ref 4.2–5.8)
RBC # FLD: 14.2 % — SIGNIFICANT CHANGE UP (ref 10.3–14.5)
RBC CASTS # UR COMP ASSIST: SIGNIFICANT CHANGE UP (ref 0–?)
RSV RNA SPEC QL NAA+PROBE: NOT DETECTED — SIGNIFICANT CHANGE UP
RV+EV RNA SPEC QL NAA+PROBE: NOT DETECTED — SIGNIFICANT CHANGE UP
SODIUM SERPL-SCNC: 132 MMOL/L — LOW (ref 135–145)
SODIUM UR-SCNC: 47 MMOL/L — SIGNIFICANT CHANGE UP
SP GR SPEC: 1.01 — SIGNIFICANT CHANGE UP (ref 1–1.04)
SQUAMOUS # UR AUTO: SIGNIFICANT CHANGE UP
UROBILINOGEN FLD QL: NORMAL MG/DL — SIGNIFICANT CHANGE UP
UUN UR-MCNC: 409.2 MG/DL — SIGNIFICANT CHANGE UP
WBC # BLD: 6.73 K/UL — SIGNIFICANT CHANGE UP (ref 3.8–10.5)
WBC # FLD AUTO: 6.73 K/UL — SIGNIFICANT CHANGE UP (ref 3.8–10.5)
WBC UR QL: SIGNIFICANT CHANGE UP (ref 0–?)

## 2018-07-15 PROCEDURE — 99233 SBSQ HOSP IP/OBS HIGH 50: CPT | Mod: GC

## 2018-07-15 PROCEDURE — 99233 SBSQ HOSP IP/OBS HIGH 50: CPT

## 2018-07-15 PROCEDURE — 74176 CT ABD & PELVIS W/O CONTRAST: CPT | Mod: 26

## 2018-07-15 RX ORDER — SODIUM CHLORIDE 9 MG/ML
1000 INJECTION INTRAMUSCULAR; INTRAVENOUS; SUBCUTANEOUS
Qty: 0 | Refills: 0 | Status: DISCONTINUED | OUTPATIENT
Start: 2018-07-15 | End: 2018-07-16

## 2018-07-15 RX ADMIN — SODIUM CHLORIDE 75 MILLILITER(S): 9 INJECTION INTRAMUSCULAR; INTRAVENOUS; SUBCUTANEOUS at 08:58

## 2018-07-15 RX ADMIN — PANTOPRAZOLE SODIUM 40 MILLIGRAM(S): 20 TABLET, DELAYED RELEASE ORAL at 05:51

## 2018-07-15 RX ADMIN — Medication 81 MILLIGRAM(S): at 13:41

## 2018-07-15 RX ADMIN — HEPARIN SODIUM 5000 UNIT(S): 5000 INJECTION INTRAVENOUS; SUBCUTANEOUS at 05:54

## 2018-07-15 RX ADMIN — Medication 75 MILLIGRAM(S): at 05:51

## 2018-07-15 RX ADMIN — Medication 3: at 13:48

## 2018-07-15 RX ADMIN — Medication 1 UNIT(S): at 18:45

## 2018-07-15 RX ADMIN — AMLODIPINE BESYLATE 10 MILLIGRAM(S): 2.5 TABLET ORAL at 05:51

## 2018-07-15 RX ADMIN — PIPERACILLIN AND TAZOBACTAM 25 GRAM(S): 4; .5 INJECTION, POWDER, LYOPHILIZED, FOR SOLUTION INTRAVENOUS at 18:45

## 2018-07-15 RX ADMIN — Medication 1 UNIT(S): at 13:48

## 2018-07-15 RX ADMIN — Medication 4: at 18:45

## 2018-07-15 RX ADMIN — Medication 2: at 09:39

## 2018-07-15 RX ADMIN — LEVETIRACETAM 500 MILLIGRAM(S): 250 TABLET, FILM COATED ORAL at 05:51

## 2018-07-15 RX ADMIN — PIPERACILLIN AND TAZOBACTAM 25 GRAM(S): 4; .5 INJECTION, POWDER, LYOPHILIZED, FOR SOLUTION INTRAVENOUS at 07:50

## 2018-07-15 RX ADMIN — CLOPIDOGREL BISULFATE 75 MILLIGRAM(S): 75 TABLET, FILM COATED ORAL at 13:41

## 2018-07-15 RX ADMIN — MYCOPHENOLIC ACID 360 MILLIGRAM(S): 180 TABLET, DELAYED RELEASE ORAL at 17:12

## 2018-07-15 RX ADMIN — ISOSORBIDE MONONITRATE 60 MILLIGRAM(S): 60 TABLET, EXTENDED RELEASE ORAL at 13:41

## 2018-07-15 RX ADMIN — MYCOPHENOLIC ACID 360 MILLIGRAM(S): 180 TABLET, DELAYED RELEASE ORAL at 05:51

## 2018-07-15 RX ADMIN — HEPARIN SODIUM 5000 UNIT(S): 5000 INJECTION INTRAVENOUS; SUBCUTANEOUS at 17:12

## 2018-07-15 RX ADMIN — Medication 5: at 22:19

## 2018-07-15 RX ADMIN — Medication 75 MILLIGRAM(S): at 15:19

## 2018-07-15 RX ADMIN — Medication 1 UNIT(S): at 09:39

## 2018-07-15 RX ADMIN — TACROLIMUS 1.5 MILLIGRAM(S): 5 CAPSULE ORAL at 17:12

## 2018-07-15 RX ADMIN — ATOVAQUONE 1500 MILLIGRAM(S): 750 SUSPENSION ORAL at 13:41

## 2018-07-15 RX ADMIN — Medication 250 MILLIGRAM(S): at 17:12

## 2018-07-15 RX ADMIN — TACROLIMUS 1.5 MILLIGRAM(S): 5 CAPSULE ORAL at 05:51

## 2018-07-15 RX ADMIN — LEVETIRACETAM 500 MILLIGRAM(S): 250 TABLET, FILM COATED ORAL at 17:12

## 2018-07-15 NOTE — PROGRESS NOTE ADULT - PROBLEM SELECTOR PLAN 2
The patient had a DDRT in 2014 and is on tacrolimus 1.5 mg BID and mycophenolate 720 mg BID. Continue with tacrolimus and reduced mycophenolate dose (360 mg BID) and follow up tacrolimus level from AM labs

## 2018-07-15 NOTE — PROGRESS NOTE ADULT - PROBLEM SELECTOR PLAN 1
-Possible sources of infection include medial malleolus wound and picc line  -Started on vanco in the ED; continue. Start on cefepime; both renally dosed   -UA not concerning for a UTI  -Osteomyelitis unlikely given negative X-ray   -F/U Blood and Urine cultures  -Podiatry consulted for foot wounds, recs as below  ---foot does not appear acutely infected at this time, no need for dressing at this time  -ID consulted per nephrology's recommendation, Cefepime switched to Zosyn (7/13- ) and continuing with Vanc (7/11- )

## 2018-07-15 NOTE — PROGRESS NOTE ADULT - SUBJECTIVE AND OBJECTIVE BOX
[facility] DIVISION OF KIDNEY DISEASES AND HYPERTENSION -- FOLLOW UP NOTE  --------------------------------------------------------------------------------  Chief Complaint:    24 hour events/subjective:        PAST HISTORY  --------------------------------------------------------------------------------  No significant changes to PMH, PSH, FHx, SHx, unless otherwise noted    ALLERGIES & MEDICATIONS  --------------------------------------------------------------------------------  Allergies    dapsone (Unknown)    Intolerances      Standing Inpatient Medications  amLODIPine   Tablet 10 milliGRAM(s) Oral daily  aspirin enteric coated 81 milliGRAM(s) Oral daily  atovaquone Suspension 1500 milliGRAM(s) Oral daily  clopidogrel Tablet 75 milliGRAM(s) Oral daily  dextrose 5%. 1000 milliLiter(s) IV Continuous <Continuous>  dextrose 5%. 1000 milliLiter(s) IV Continuous <Continuous>  dextrose 50% Injectable 12.5 Gram(s) IV Push once  dextrose 50% Injectable 25 Gram(s) IV Push once  dextrose 50% Injectable 25 Gram(s) IV Push once  heparin  Injectable 5000 Unit(s) SubCutaneous every 12 hours  hydrALAZINE 75 milliGRAM(s) Oral every 8 hours  insulin lispro (HumaLOG) corrective regimen sliding scale   SubCutaneous three times a day before meals  insulin lispro (HumaLOG) corrective regimen sliding scale   SubCutaneous at bedtime  insulin lispro Injectable (HumaLOG) 1 Unit(s) SubCutaneous three times a day before meals  isosorbide   mononitrate ER Tablet (IMDUR) 60 milliGRAM(s) Oral daily  levETIRAcetam 500 milliGRAM(s) Oral two times a day  mycophenolic acid  milliGRAM(s) Oral two times a day  pantoprazole    Tablet 40 milliGRAM(s) Oral before breakfast  piperacillin/tazobactam IVPB. 3.375 Gram(s) IV Intermittent every 12 hours  sodium chloride 0.9%. 1000 milliLiter(s) IV Continuous <Continuous>  tacrolimus 1.5 milliGRAM(s) Oral every 12 hours  vancomycin  IVPB 1000 milliGRAM(s) IV Intermittent every 24 hours    PRN Inpatient Medications  dextrose 40% Gel 15 Gram(s) Oral once PRN  glucagon  Injectable 1 milliGRAM(s) IntraMuscular once PRN      REVIEW OF SYSTEMS  --------------------------------------------------------------------------------  Gen: No weight changes, fatigue, fevers/chills, weakness  Skin: No rashes  Head/Eyes/Ears/Mouth: No headache; Normal hearing; Normal vision w/o blurriness; No sinus pain/discomfort, sore throat  Respiratory: No dyspnea, cough, wheezing, hemoptysis  CV: No chest pain, PND, orthopnea  GI: No abdominal pain, diarrhea, constipation, nausea, vomiting, melena, hematochezia  : No increased frequency, dysuria, hematuria, nocturia  MSK: No joint pain/swelling; no back pain; no edema  Neuro: No dizziness/lightheadedness, weakness, seizures, numbness, tingling  Heme: No easy bruising or bleeding  Endo: No heat/cold intolerance  Psych: No significant nervousness, anxiety, stress, depression    All other systems were reviewed and are negative, except as noted.    VITALS/PHYSICAL EXAM  --------------------------------------------------------------------------------  T(C): 36.9 (07-15-18 @ 05:48), Max: 37 (07-14-18 @ 21:12)  HR: 60 (07-15-18 @ 05:48) (60 - 60)  BP: 151/75 (07-15-18 @ 05:48) (135/72 - 151/75)  RR: 18 (07-15-18 @ 05:48) (16 - 18)  SpO2: 98% (07-15-18 @ 05:48) (98% - 100%)  Wt(kg): --        07-14-18 @ 07:01  -  07-15-18 @ 07:00  --------------------------------------------------------  IN: 0 mL / OUT: 1450 mL / NET: -1450 mL      Physical Exam:  	Gen: NAD, well-appearing  	HEENT: PERRL, supple neck, clear oropharynx  	Pulm: CTA B/L  	CV: RRR, S1S2; no rub  	Back: No spinal or CVA tenderness; no sacral edema  	Abd: +BS, soft, nontender/nondistended  	: No suprapubic tenderness  	UE: Warm, FROM, no clubbing, intact strength; no edema; no asterixis  	LE: Warm, FROM, no clubbing, intact strength; no edema  	Neuro: No focal deficits, intact gait  	Psych: Normal affect and mood  	Skin: Warm, without rashes  	Vascular access:    LABS/STUDIES  --------------------------------------------------------------------------------              13.1   6.73  >-----------<  123      [07-15-18 @ 05:35]              42.0     132  |  95  |  22  ----------------------------<  236      [07-15-18 @ 05:35]  4.4   |  18  |  1.90        Ca     9.5     [07-15-18 @ 05:35]      Mg     1.7     [07-14-18 @ 05:55]      Phos  3.4     [07-14-18 @ 05:55]    Creatinine Trend:  SCr 1.90 [07-15 @ 05:35]  SCr 1.74 [07-14 @ 05:55]  SCr 2.00 [07-13 @ 05:30]  SCr 2.14 [07-12 @ 04:45]  SCr 2.20 [07-11 @ 12:00]    Urinalysis - [07-15-18 @ 10:18]      Color YELLOW / Appearance CLEAR / SG 1.008 / pH 5.5      Gluc 1000 / Ketone NEGATIVE  / Bili NEGATIVE / Urobili NORMAL       Blood MODERATE / Protein 30 / Leuk Est NEGATIVE / Nitrite NEGATIVE      RBC 25-50 / WBC 2-5 / Hyaline  / Gran  / Sq Epi OCC / Non Sq Epi  / Bacteria     Urine Creatinine 67.90      [07-15-18 @ 08:18]  Urine Sodium 47      [07-15-18 @ 05:38]  Urine Urea Nitrogen 409.2      [07-15-18 @ 09:00]  Urine Osmolality 313      [07-15-18 @ 05:38] [facility] DIVISION OF KIDNEY DISEASES AND HYPERTENSION -- FOLLOW UP NOTE  --------------------------------------------------------------------------------  HPI: 53 year old man with a history of DDRT (2/2014 on tacrolimus 1.5 mg BID and mycophenolate 360 mg BID), DM2, HTN, CAD s/p PCI, left nephrectomy, subdural hematoma s/p craniotomy (prior to 2014), seizure disorder, presented to the hospital with 3 days of chills, poor PO intake, and decreased vision. Pt. noted to have elevated Scr of 2.2 on labs in ER. Today his creatinine improved to 2.14. His baseline Scr ranges from 1.3-1.5.     Patient gives history of headache, chills, nausea and "cola" colored urine for the past 3 days. He has had decreased PO intake and has only been drinking some water. 3 weeks ago he was admitted to the hospital in [place] for bacteremia associated with a wound on the medial aspect of his left foot. He was discharged with IV Daptomycin and has been on it for two weeks at home with the assistance of a visiting nurse. This past week his PICC line was found to be occluded and the visiting nurse removed it. Pt. says that he saw his transplant nephrologist Dr. Miller on 7/2/18. Scr was 1.3 on labs done on 7/2/18.    Today the patient was seen and examined at bedside. He feels improved overall. Has been able to have bowel movements and urinating without concern. Denies shortness of breath, chest pain, nausea, vomiting, and diarrhea.    PAST HISTORY  --------------------------------------------------------------------------------  No significant changes to PMH, PSH, FHx, SHx, unless otherwise noted    ALLERGIES & MEDICATIONS  --------------------------------------------------------------------------------  Allergies    dapsone (Unknown)    Intolerances    Standing Inpatient Medications  amLODIPine   Tablet 10 milliGRAM(s) Oral daily  aspirin enteric coated 81 milliGRAM(s) Oral daily  atovaquone Suspension 1500 milliGRAM(s) Oral daily  clopidogrel Tablet 75 milliGRAM(s) Oral daily  dextrose 5%. 1000 milliLiter(s) IV Continuous <Continuous>  dextrose 5%. 1000 milliLiter(s) IV Continuous <Continuous>  dextrose 50% Injectable 12.5 Gram(s) IV Push once  dextrose 50% Injectable 25 Gram(s) IV Push once  dextrose 50% Injectable 25 Gram(s) IV Push once  heparin  Injectable 5000 Unit(s) SubCutaneous every 12 hours  hydrALAZINE 75 milliGRAM(s) Oral every 8 hours  insulin lispro (HumaLOG) corrective regimen sliding scale   SubCutaneous three times a day before meals  insulin lispro (HumaLOG) corrective regimen sliding scale   SubCutaneous at bedtime  insulin lispro Injectable (HumaLOG) 1 Unit(s) SubCutaneous three times a day before meals  isosorbide   mononitrate ER Tablet (IMDUR) 60 milliGRAM(s) Oral daily  levETIRAcetam 500 milliGRAM(s) Oral two times a day  mycophenolic acid  milliGRAM(s) Oral two times a day  pantoprazole    Tablet 40 milliGRAM(s) Oral before breakfast  piperacillin/tazobactam IVPB. 3.375 Gram(s) IV Intermittent every 12 hours  sodium chloride 0.9%. 1000 milliLiter(s) IV Continuous <Continuous>  tacrolimus 1.5 milliGRAM(s) Oral every 12 hours  vancomycin  IVPB 1000 milliGRAM(s) IV Intermittent every 24 hours    PRN Inpatient Medications  dextrose 40% Gel 15 Gram(s) Oral once PRN  glucagon  Injectable 1 milliGRAM(s) IntraMuscular once PRN    REVIEW OF SYSTEMS  --------------------------------------------------------------------------------  Gen: No lethargy, no fever  Respiratory: No dyspnea  CV: No chest pain  GI: No abdominal pain  MSK: No LE edema  Neuro: No dizziness  Heme: No bleeding    All other systems were reviewed and are negative, except as noted.  VITALS/PHYSICAL EXAM  --------------------------------------------------------------------------------  T(C): 36.9 (07-15-18 @ 05:48), Max: 37 (07-14-18 @ 21:12)  HR: 60 (07-15-18 @ 05:48) (60 - 60)  BP: 151/75 (07-15-18 @ 05:48) (135/72 - 151/75)  RR: 18 (07-15-18 @ 05:48) (16 - 18)  SpO2: 98% (07-15-18 @ 05:48) (98% - 100%)  Wt(kg): --    07-14-18 @ 07:01  -  07-15-18 @ 07:00  --------------------------------------------------------  IN: 0 mL / OUT: 1450 mL / NET: -1450 mL      Physical Exam:  Gen: resting, NAD  	HEENT: No JVD  	Pulm: CTA B/L  	CV: S1S2+  	Abd: Soft, +BS   	Ext:  Healing wound on the medial aspect of the left foot. No LE edema B/L  	Neuro: Awake  	Skin: Warm and dry  	Vascular access: LUE AVF: +thrill  LABS/STUDIES  --------------------------------------------------------------------------------              13.1   6.73  >-----------<  123      [07-15-18 @ 05:35]              42.0     132  |  95  |  22  ----------------------------<  236      [07-15-18 @ 05:35]  4.4   |  18  |  1.90        Ca     9.5     [07-15-18 @ 05:35]      Mg     1.7     [07-14-18 @ 05:55]      Phos  3.4     [07-14-18 @ 05:55]    Creatinine Trend:  SCr 1.90 [07-15 @ 05:35]  SCr 1.74 [07-14 @ 05:55]  SCr 2.00 [07-13 @ 05:30]  SCr 2.14 [07-12 @ 04:45]  SCr 2.20 [07-11 @ 12:00]    Urinalysis - [07-15-18 @ 10:18]      Color YELLOW / Appearance CLEAR / SG 1.008 / pH 5.5      Gluc 1000 / Ketone NEGATIVE  / Bili NEGATIVE / Urobili NORMAL       Blood MODERATE / Protein 30 / Leuk Est NEGATIVE / Nitrite NEGATIVE      RBC 25-50 / WBC 2-5 / Hyaline  / Gran  / Sq Epi OCC / Non Sq Epi  / Bacteria     Urine Creatinine 67.90      [07-15-18 @ 08:18]  Urine Sodium 47      [07-15-18 @ 05:38]  Urine Urea Nitrogen 409.2      [07-15-18 @ 09:00]  Urine Osmolality 313      [07-15-18 @ 05:38] [facility] DIVISION OF KIDNEY DISEASES AND HYPERTENSION -- FOLLOW UP NOTE  --------------------------------------------------------------------------------  HPI: 53 year old man with a history of DDRT (2/2014 on tacrolimus 1.5 mg BID and mycophenolate 360 mg BID), DM2, HTN, CAD s/p PCI, left nephrectomy, subdural hematoma s/p craniotomy (prior to 2014), seizure disorder, presented to the hospital with 3 days of chills, poor PO intake, and decreased vision. Pt. noted to have elevated Scr of 2.2 on labs in ER.  Scr ranges from 1.3-1.5.     Patient gives history of headache, chills, nausea and "cola" colored urine for the past 3 days. He has had decreased PO intake and has only been drinking some water. 3 weeks ago he was admitted to the hospital in [place] for bacteremia associated with a wound on the medial aspect of his left foot. He was discharged with IV Daptomycin and has been on it for two weeks at home with the assistance of a visiting nurse. This past week his PICC line was found to be occluded and the visiting nurse removed it. Pt. says that he saw his transplant nephrologist Dr. Miller on 7/2/18. Scr was 1.3 on labs done on 7/2/18.    Today the patient was seen and examined at bedside. He feels improved overall. Has been able to have bowel movements and urinating without concern. Denies shortness of breath, chest pain, nausea, vomiting, and diarrhea.    PAST HISTORY  --------------------------------------------------------------------------------  No significant changes to PMH, PSH, FHx, SHx, unless otherwise noted    ALLERGIES & MEDICATIONS  --------------------------------------------------------------------------------  Allergies    dapsone (Unknown)    Intolerances    Standing Inpatient Medications  amLODIPine   Tablet 10 milliGRAM(s) Oral daily  aspirin enteric coated 81 milliGRAM(s) Oral daily  atovaquone Suspension 1500 milliGRAM(s) Oral daily  clopidogrel Tablet 75 milliGRAM(s) Oral daily  dextrose 5%. 1000 milliLiter(s) IV Continuous <Continuous>  dextrose 5%. 1000 milliLiter(s) IV Continuous <Continuous>  dextrose 50% Injectable 12.5 Gram(s) IV Push once  dextrose 50% Injectable 25 Gram(s) IV Push once  dextrose 50% Injectable 25 Gram(s) IV Push once  heparin  Injectable 5000 Unit(s) SubCutaneous every 12 hours  hydrALAZINE 75 milliGRAM(s) Oral every 8 hours  insulin lispro (HumaLOG) corrective regimen sliding scale   SubCutaneous three times a day before meals  insulin lispro (HumaLOG) corrective regimen sliding scale   SubCutaneous at bedtime  insulin lispro Injectable (HumaLOG) 1 Unit(s) SubCutaneous three times a day before meals  isosorbide   mononitrate ER Tablet (IMDUR) 60 milliGRAM(s) Oral daily  levETIRAcetam 500 milliGRAM(s) Oral two times a day  mycophenolic acid  milliGRAM(s) Oral two times a day  pantoprazole    Tablet 40 milliGRAM(s) Oral before breakfast  piperacillin/tazobactam IVPB. 3.375 Gram(s) IV Intermittent every 12 hours  sodium chloride 0.9%. 1000 milliLiter(s) IV Continuous <Continuous>  tacrolimus 1.5 milliGRAM(s) Oral every 12 hours  vancomycin  IVPB 1000 milliGRAM(s) IV Intermittent every 24 hours    PRN Inpatient Medications  dextrose 40% Gel 15 Gram(s) Oral once PRN  glucagon  Injectable 1 milliGRAM(s) IntraMuscular once PRN    REVIEW OF SYSTEMS  --------------------------------------------------------------------------------  Gen: No lethargy, no fever  Respiratory: No dyspnea  CV: No chest pain  GI: No abdominal pain  MSK: No LE edema  Neuro: No dizziness  Heme: No bleeding    All other systems were reviewed and are negative, except as noted.  VITALS/PHYSICAL EXAM  --------------------------------------------------------------------------------  T(C): 36.9 (07-15-18 @ 05:48), Max: 37 (07-14-18 @ 21:12)  HR: 60 (07-15-18 @ 05:48) (60 - 60)  BP: 151/75 (07-15-18 @ 05:48) (135/72 - 151/75)  RR: 18 (07-15-18 @ 05:48) (16 - 18)  SpO2: 98% (07-15-18 @ 05:48) (98% - 100%)  Wt(kg): --    07-14-18 @ 07:01  -  07-15-18 @ 07:00  --------------------------------------------------------  IN: 0 mL / OUT: 1450 mL / NET: -1450 mL      Physical Exam:  Gen: resting, NAD  	HEENT: No JVD  	Pulm: CTA B/L  	CV: S1S2+  	Abd: Soft, +BS   	Ext:  Healing wound on the medial aspect of the left foot. No LE edema B/L  	Neuro: Awake  	Skin: Warm and dry  	Vascular access: LUE AVF: +thrill  LABS/STUDIES  --------------------------------------------------------------------------------              13.1   6.73  >-----------<  123      [07-15-18 @ 05:35]              42.0     132  |  95  |  22  ----------------------------<  236      [07-15-18 @ 05:35]  4.4   |  18  |  1.90        Ca     9.5     [07-15-18 @ 05:35]      Mg     1.7     [07-14-18 @ 05:55]      Phos  3.4     [07-14-18 @ 05:55]    Creatinine Trend:  SCr 1.90 [07-15 @ 05:35]  SCr 1.74 [07-14 @ 05:55]  SCr 2.00 [07-13 @ 05:30]  SCr 2.14 [07-12 @ 04:45]  SCr 2.20 [07-11 @ 12:00]    Urinalysis - [07-15-18 @ 10:18]      Color YELLOW / Appearance CLEAR / SG 1.008 / pH 5.5      Gluc 1000 / Ketone NEGATIVE  / Bili NEGATIVE / Urobili NORMAL       Blood MODERATE / Protein 30 / Leuk Est NEGATIVE / Nitrite NEGATIVE      RBC 25-50 / WBC 2-5 / Hyaline  / Gran  / Sq Epi OCC / Non Sq Epi  / Bacteria     Urine Creatinine 67.90      [07-15-18 @ 08:18]  Urine Sodium 47      [07-15-18 @ 05:38]  Urine Urea Nitrogen 409.2      [07-15-18 @ 09:00]  Urine Osmolality 313      [07-15-18 @ 05:38]

## 2018-07-15 NOTE — PROGRESS NOTE ADULT - PROBLEM SELECTOR PLAN 3
-Continue Atovaquone, Mycophenolic acid and Tacrolimus  -F/U Tacrolimus levels  -Nephrology consulted, appreciate recs  Renal US pending

## 2018-07-15 NOTE — PROGRESS NOTE ADULT - SUBJECTIVE AND OBJECTIVE BOX
CHIEF COMPLAINT: Chills and Rigors    Interval Events: No acute event overnight. Patient is seen and examined at the bedside this morning.     REVIEW OF SYSTEMS:   Constitutional: -Chills, -rigors, -drowsiness, -generalized weakness. No fever, no  	CV: No chest pain, or palpitations. No orthopnea.   	Resp: No cough, or sputum production. No shortness of breath or dyspnea on exertion.   	GI: -nausea, -vomiting. No diarrhea or constipation. No abdominal pain.   	: No dysuria, no nocturia, +increased urinary frequency.  	Musculoskeletal: No back pain, no myalgias  	Skin: No rash or itchiness, left foot  	Neurological: No headache or dizziness. No syncope, no weakness, numbness.  Psychiatric: worried about his kidney function deteriorating     OBJECTIVE:  Vital Signs Last 24 Hrs  T(C): 36.9 (15 Jul 2018 05:48), Max: 37 (14 Jul 2018 21:12)  T(F): 98.5 (15 Jul 2018 05:48), Max: 98.6 (14 Jul 2018 21:12)  HR: 60 (15 Jul 2018 05:48) (60 - 60)  BP: 151/75 (15 Jul 2018 05:48) (135/72 - 151/75)  BP(mean): --  RR: 18 (15 Jul 2018 05:48) (16 - 18)  SpO2: 98% (15 Jul 2018 05:48) (98% - 100%)    PHYSICAL EXAM:  General: Alert and cooperative. Not in acute stress. Well developed, well nourished.   	Head: Normocephalic,  	Eyes: Decreased vison, clear conjunctiva. EOMI, no ptosis.   	Neck: No lymphadenopathy, no masses, no thyromegaly.   	Respiratory: Bilateral lung clear to auscultation, no crackles, no wheezes, no rhonchi.   	Cardiovascular: S1/S2 auscultated, no murmur, or gallop. Rhythm is regular. There is no peripheral edema, cyanosis or pallor. Extremities are warm and well perfused.   	Abdomen: Soft, non-tender, nondistended, no guarding or rebound tenderness. Active bowel sounds in all 4 quadrants.   	Musculoskeletal: Adequately aligned spine. ROM intact spine and extremities. Healing wound on left medial malleolus and small area of necrosis on left toe  Neurological: AOAx4. CN2-12 grosslly intact. Decreased sensation in bilateral feet      MEDICATIONS  (STANDING):  amLODIPine   Tablet 10 milliGRAM(s) Oral daily  aspirin enteric coated 81 milliGRAM(s) Oral daily  atovaquone Suspension 1500 milliGRAM(s) Oral daily  clopidogrel Tablet 75 milliGRAM(s) Oral daily  dextrose 5%. 1000 milliLiter(s) (50 mL/Hr) IV Continuous <Continuous>  dextrose 5%. 1000 milliLiter(s) (50 mL/Hr) IV Continuous <Continuous>  dextrose 50% Injectable 12.5 Gram(s) IV Push once  dextrose 50% Injectable 25 Gram(s) IV Push once  dextrose 50% Injectable 25 Gram(s) IV Push once  heparin  Injectable 5000 Unit(s) SubCutaneous every 12 hours  hydrALAZINE 75 milliGRAM(s) Oral every 8 hours  insulin lispro (HumaLOG) corrective regimen sliding scale   SubCutaneous three times a day before meals  insulin lispro (HumaLOG) corrective regimen sliding scale   SubCutaneous at bedtime  insulin lispro Injectable (HumaLOG) 1 Unit(s) SubCutaneous three times a day before meals  isosorbide   mononitrate ER Tablet (IMDUR) 60 milliGRAM(s) Oral daily  levETIRAcetam 500 milliGRAM(s) Oral two times a day  mycophenolic acid  milliGRAM(s) Oral two times a day  pantoprazole    Tablet 40 milliGRAM(s) Oral before breakfast  piperacillin/tazobactam IVPB. 3.375 Gram(s) IV Intermittent every 12 hours  sodium chloride 0.9%. 1000 milliLiter(s) (75 mL/Hr) IV Continuous <Continuous>  tacrolimus 1.5 milliGRAM(s) Oral every 12 hours  vancomycin  IVPB 1000 milliGRAM(s) IV Intermittent every 24 hours    MEDICATIONS  (PRN):  dextrose 40% Gel 15 Gram(s) Oral once PRN Blood Glucose LESS THAN 70 milliGRAM(s)/deciliter  glucagon  Injectable 1 milliGRAM(s) IntraMuscular once PRN Glucose LESS THAN 70 milligrams/deciliter              LABS:                                   13.1   6.73  )-----------( 123      ( 15 Jul 2018 05:35 )             42.0     07-15    132<L>  |  95<L>  |  22  ----------------------------<  236<H>  4.4   |  18<L>  |  1.90<H>    Ca    9.5      15 Jul 2018 05:35  Phos  3.4     07-14  Mg     1.7     07-14              MICROBIOLOGY:     RADIOLOGY:  [ ] Reviewed and interpreted by me    EKG:

## 2018-07-15 NOTE — PROGRESS NOTE ADULT - PROBLEM SELECTOR PLAN 6
-hold home losartan  -continue isosorbide mononitrate 60mg; will increase to 90 if BP not well controlled  -c/w hydralazine 75mg, Amlodipine increased to 10mg

## 2018-07-15 NOTE — PROGRESS NOTE ADULT - PROBLEM SELECTOR PLAN 1
Pt. with RICCO, likely hemodynamically mediated in setting of bacteremia and decreased oral intake. Scr was 1.3 on labs done on 7/2/18, increased to 2.2 on 7/11/18 and has is stable at 1.90 today. Recommend continuing IV NS @ 75 cc/hr. Check sonogram of kidney allograft or CT of Abdomen/Pelvis, vancomycin trough level, tacrolimus trough level before AM dose. Obtain urine culture. Monitor labs and urine output. Avoid any potential nephrotoxins Pt. with RICCO, likely hemodynamically mediated in setting of bacteremia and decreased oral intake. Scr was 1.3 on labs done on 7/2/18, increased to 2.2 on 7/11/18 and has is stable at 1.90 today. Recommend continuing IV NS @ 75 cc/hr. Check sonogram of kidney allograft or CT of Abdomen/Pelvis, vancomycin trough level, tacrolimus trough level before AM dose. Obtain urine culture. Monitor labs and urine output. Avoid any potential nephrotoxins.  Given new hematuria and RICCO would also check RVP as adenovirus can cause hemorrhagic cystitis / RICCO in transplant.  Would also check c3 and c4 could this be infection associated GN?

## 2018-07-16 LAB
BACTERIA BLD CULT: SIGNIFICANT CHANGE UP
BUN SERPL-MCNC: 19 MG/DL — SIGNIFICANT CHANGE UP (ref 7–23)
C3 SERPL-MCNC: 139.3 MG/DL — SIGNIFICANT CHANGE UP (ref 90–180)
C4 SERPL-MCNC: 32.6 MG/DL — SIGNIFICANT CHANGE UP (ref 10–40)
CALCIUM SERPL-MCNC: 9.3 MG/DL — SIGNIFICANT CHANGE UP (ref 8.4–10.5)
CHLORIDE SERPL-SCNC: 100 MMOL/L — SIGNIFICANT CHANGE UP (ref 98–107)
CO2 SERPL-SCNC: 18 MMOL/L — LOW (ref 22–31)
CREAT SERPL-MCNC: 1.77 MG/DL — HIGH (ref 0.5–1.3)
GLUCOSE SERPL-MCNC: 262 MG/DL — HIGH (ref 70–99)
HCT VFR BLD CALC: 38.7 % — LOW (ref 39–50)
HGB BLD-MCNC: 12.4 G/DL — LOW (ref 13–17)
MCHC RBC-ENTMCNC: 26.2 PG — LOW (ref 27–34)
MCHC RBC-ENTMCNC: 32 % — SIGNIFICANT CHANGE UP (ref 32–36)
MCV RBC AUTO: 81.8 FL — SIGNIFICANT CHANGE UP (ref 80–100)
NRBC # FLD: 0 — SIGNIFICANT CHANGE UP
PLATELET # BLD AUTO: 131 K/UL — LOW (ref 150–400)
PMV BLD: 12.5 FL — SIGNIFICANT CHANGE UP (ref 7–13)
POTASSIUM SERPL-MCNC: 4.3 MMOL/L — SIGNIFICANT CHANGE UP (ref 3.5–5.3)
POTASSIUM SERPL-SCNC: 4.3 MMOL/L — SIGNIFICANT CHANGE UP (ref 3.5–5.3)
RBC # BLD: 4.73 M/UL — SIGNIFICANT CHANGE UP (ref 4.2–5.8)
RBC # FLD: 14.2 % — SIGNIFICANT CHANGE UP (ref 10.3–14.5)
SODIUM SERPL-SCNC: 133 MMOL/L — LOW (ref 135–145)
TACROLIMUS SERPL-MCNC: 6.7 NG/ML — SIGNIFICANT CHANGE UP
WBC # BLD: 6.38 K/UL — SIGNIFICANT CHANGE UP (ref 3.8–10.5)
WBC # FLD AUTO: 6.38 K/UL — SIGNIFICANT CHANGE UP (ref 3.8–10.5)

## 2018-07-16 PROCEDURE — 99232 SBSQ HOSP IP/OBS MODERATE 35: CPT

## 2018-07-16 PROCEDURE — 99233 SBSQ HOSP IP/OBS HIGH 50: CPT | Mod: GC

## 2018-07-16 PROCEDURE — 99232 SBSQ HOSP IP/OBS MODERATE 35: CPT | Mod: GC

## 2018-07-16 RX ORDER — CEFTRIAXONE 500 MG/1
2 INJECTION, POWDER, FOR SOLUTION INTRAMUSCULAR; INTRAVENOUS EVERY 24 HOURS
Qty: 0 | Refills: 0 | Status: DISCONTINUED | OUTPATIENT
Start: 2018-07-17 | End: 2018-07-18

## 2018-07-16 RX ORDER — CEFTRIAXONE 500 MG/1
INJECTION, POWDER, FOR SOLUTION INTRAMUSCULAR; INTRAVENOUS
Qty: 0 | Refills: 0 | Status: DISCONTINUED | OUTPATIENT
Start: 2018-07-16 | End: 2018-07-18

## 2018-07-16 RX ORDER — CEFTRIAXONE 500 MG/1
2 INJECTION, POWDER, FOR SOLUTION INTRAMUSCULAR; INTRAVENOUS EVERY 24 HOURS
Qty: 0 | Refills: 0 | Status: DISCONTINUED | OUTPATIENT
Start: 2018-07-16 | End: 2018-07-16

## 2018-07-16 RX ORDER — CEFTRIAXONE 500 MG/1
2 INJECTION, POWDER, FOR SOLUTION INTRAMUSCULAR; INTRAVENOUS ONCE
Qty: 0 | Refills: 0 | Status: COMPLETED | OUTPATIENT
Start: 2018-07-16 | End: 2018-07-16

## 2018-07-16 RX ADMIN — Medication 75 MILLIGRAM(S): at 05:40

## 2018-07-16 RX ADMIN — Medication 75 MILLIGRAM(S): at 14:47

## 2018-07-16 RX ADMIN — LEVETIRACETAM 500 MILLIGRAM(S): 250 TABLET, FILM COATED ORAL at 17:28

## 2018-07-16 RX ADMIN — CLOPIDOGREL BISULFATE 75 MILLIGRAM(S): 75 TABLET, FILM COATED ORAL at 12:27

## 2018-07-16 RX ADMIN — HEPARIN SODIUM 5000 UNIT(S): 5000 INJECTION INTRAVENOUS; SUBCUTANEOUS at 17:28

## 2018-07-16 RX ADMIN — Medication 81 MILLIGRAM(S): at 12:27

## 2018-07-16 RX ADMIN — CEFTRIAXONE 100 GRAM(S): 500 INJECTION, POWDER, FOR SOLUTION INTRAMUSCULAR; INTRAVENOUS at 17:27

## 2018-07-16 RX ADMIN — LEVETIRACETAM 500 MILLIGRAM(S): 250 TABLET, FILM COATED ORAL at 05:38

## 2018-07-16 RX ADMIN — AMLODIPINE BESYLATE 10 MILLIGRAM(S): 2.5 TABLET ORAL at 05:40

## 2018-07-16 RX ADMIN — Medication 75 MILLIGRAM(S): at 22:47

## 2018-07-16 RX ADMIN — Medication 3: at 13:28

## 2018-07-16 RX ADMIN — Medication 1 UNIT(S): at 09:26

## 2018-07-16 RX ADMIN — Medication 250 MILLIGRAM(S): at 18:11

## 2018-07-16 RX ADMIN — Medication 1 UNIT(S): at 18:50

## 2018-07-16 RX ADMIN — MYCOPHENOLIC ACID 360 MILLIGRAM(S): 180 TABLET, DELAYED RELEASE ORAL at 05:37

## 2018-07-16 RX ADMIN — Medication 1 UNIT(S): at 13:27

## 2018-07-16 RX ADMIN — TACROLIMUS 1.5 MILLIGRAM(S): 5 CAPSULE ORAL at 17:28

## 2018-07-16 RX ADMIN — Medication 2: at 09:26

## 2018-07-16 RX ADMIN — PANTOPRAZOLE SODIUM 40 MILLIGRAM(S): 20 TABLET, DELAYED RELEASE ORAL at 05:41

## 2018-07-16 RX ADMIN — TACROLIMUS 1.5 MILLIGRAM(S): 5 CAPSULE ORAL at 05:38

## 2018-07-16 RX ADMIN — PIPERACILLIN AND TAZOBACTAM 25 GRAM(S): 4; .5 INJECTION, POWDER, LYOPHILIZED, FOR SOLUTION INTRAVENOUS at 05:39

## 2018-07-16 RX ADMIN — ISOSORBIDE MONONITRATE 60 MILLIGRAM(S): 60 TABLET, EXTENDED RELEASE ORAL at 12:27

## 2018-07-16 RX ADMIN — Medication 4: at 22:46

## 2018-07-16 RX ADMIN — HEPARIN SODIUM 5000 UNIT(S): 5000 INJECTION INTRAVENOUS; SUBCUTANEOUS at 05:36

## 2018-07-16 RX ADMIN — Medication 5: at 18:50

## 2018-07-16 RX ADMIN — MYCOPHENOLIC ACID 360 MILLIGRAM(S): 180 TABLET, DELAYED RELEASE ORAL at 17:28

## 2018-07-16 RX ADMIN — ATOVAQUONE 1500 MILLIGRAM(S): 750 SUSPENSION ORAL at 12:27

## 2018-07-16 NOTE — PROGRESS NOTE ADULT - ASSESSMENT
53 year old man with a history of DDRT (2014), CAD, DM2, HTN, siezure presents to the hospital with chills, decreased vision, headache. Pt. found to have RICCO. 53 year old man with a history of DDRT (2014), CAD, DM2, HTN, siezure admitted with bactermia and RICCO.

## 2018-07-16 NOTE — PROGRESS NOTE ADULT - SUBJECTIVE AND OBJECTIVE BOX
CHIEF COMPLAINT:    Interval Events: No acute event overnight. Patient is seen and examined at the bedside this morning.     REVIEW OF SYSTEMS:  Constitutional: No fever, or chills. No recent weight loss or weight gain.   HEENT: No dry eyes or eye irritation. No postnasal drip or nasal congestion.  CV: No chest pain, or palpitations. No orthopnea.   Resp: No cough, or sputum production. No shortness of breath or dyspnea on exertion.   GI: No nausea or vomiting. No diarrhea or constipation. No abdominal pain.   : No dysuria, no nocturia or increased urinary frequency.  Musculoskeletal: No back pain, no myalgias  Skin: No rash or itchiness.   Neurological: No headache or dizziness. No syncope, no weakness, numbness.  Psychiatric: Denies depressed mood.   Endocrine: No cold or heat intolerance. No dry skin.  Hematologic/Lymphatic: No anemia or bleeding problem.     OBJECTIVE:  Vital Signs Last 24 Hrs  T(C): 36.7 (2018 05:34), Max: 36.9 (15 Jul 2018 21:36)  T(F): 98.1 (2018 05:34), Max: 98.5 (15 Jul 2018 21:36)  HR: 57 (2018 05:34) (57 - 60)  BP: 154/72 (2018 05:34) (120/70 - 154/72)  BP(mean): --  RR: 20 (2018 05:34) (16 - 20)  SpO2: 100% (2018 05:34) (98% - 100%)    07-15 @ 07:01  -  07-16 @ 07:00  --------------------------------------------------------  IN: 540 mL / OUT: 1050 mL / NET: -510 mL      CAPILLARY BLOOD GLUCOSE      POCT Blood Glucose.: 202 mg/dL (2018 09:17)      PHYSICAL EXAM:  General: Alert and cooperative. Not in acute stress. Well developed, well nourished.   Head: Normocephalic, no mass and lesions.  Eyes: Intact visual fields. PERRLA, clear conjunctiva. EOMI, no ptosis.   Throat: Oral cavity and pharynx normal. No inflammation, swelling, exudate, or lesions. Teeth and gingiva in good general condition.  Neck: No lymphadenopathy, no masses, no thyromegaly. Carotid pulses 2+. No JVD.   Respiratory: Bilateral lung clear to auscultation, no crackles, no wheezes, no rhonchi.   Cardiovascular: S1/S2 auscultated, no murmur, or gallop. Rhythm is regular. There is no peripheral edema, cyanosis or pallor. Extremities are warm and well perfused. Capillary refill is less than 2 seconds. No carotid bruits.  Abdomen: Soft, non-tender, nondistended, no guarding or rebound tenderness. Active bowel sounds in all 4 quadrants. No hepatosplenomegaly.   Musculoskeletal: Adequately aligned spine. ROM intact spine and extremities. No joint erythema or tenderness. Normal muscular development. Normal gait.   Extremities: No significant deformity or joint abnormality. Peripheral pulses intact. No varicosities. No peripheral edema, atrophy.   Skin: Intact, no rash. Normal color, texture and turgor with no lesions or eruptions.  Neurological: AOAx4. CN2-12 grosslly intact. Strength and sensation symmetric and intact throughout. Reflexes 2+ throughout. Cerebellar testing normal.  Psychiatry: The mental examination revealed the patient was oriented to person, place, and time. The patient was able to demonstrate good judgement and reason, without hallucinations, abnormal affect or abnormal behaviors during the examination. Patient is not suicidal.     LINES:    HOSPITAL MEDICATIONS:  aspirin enteric coated 81 milliGRAM(s) Oral daily  clopidogrel Tablet 75 milliGRAM(s) Oral daily  heparin  Injectable 5000 Unit(s) SubCutaneous every 12 hours    atovaquone Suspension 1500 milliGRAM(s) Oral daily  piperacillin/tazobactam IVPB. 3.375 Gram(s) IV Intermittent every 12 hours  vancomycin  IVPB 1000 milliGRAM(s) IV Intermittent every 24 hours    amLODIPine   Tablet 10 milliGRAM(s) Oral daily  hydrALAZINE 75 milliGRAM(s) Oral every 8 hours  isosorbide   mononitrate ER Tablet (IMDUR) 60 milliGRAM(s) Oral daily    dextrose 40% Gel 15 Gram(s) Oral once PRN  dextrose 50% Injectable 12.5 Gram(s) IV Push once  dextrose 50% Injectable 25 Gram(s) IV Push once  dextrose 50% Injectable 25 Gram(s) IV Push once  glucagon  Injectable 1 milliGRAM(s) IntraMuscular once PRN  insulin lispro (HumaLOG) corrective regimen sliding scale   SubCutaneous three times a day before meals  insulin lispro (HumaLOG) corrective regimen sliding scale   SubCutaneous at bedtime  insulin lispro Injectable (HumaLOG) 1 Unit(s) SubCutaneous three times a day before meals      levETIRAcetam 500 milliGRAM(s) Oral two times a day    pantoprazole    Tablet 40 milliGRAM(s) Oral before breakfast        dextrose 5%. 1000 milliLiter(s) IV Continuous <Continuous>  dextrose 5%. 1000 milliLiter(s) IV Continuous <Continuous>  sodium chloride 0.9%. 1000 milliLiter(s) IV Continuous <Continuous>    mycophenolic acid  milliGRAM(s) Oral two times a day  tacrolimus 1.5 milliGRAM(s) Oral every 12 hours          LABS:                        12.4   6.38  )-----------( 131      ( 2018 05:20 )             38.7     Hgb Trend: 12.4<--, 13.1<--, 12.8<--, 12.2<--, 13.4<--  07-16    133<L>  |  100  |  19  ----------------------------<  262<H>  4.3   |  18<L>  |  1.77<H>    Ca    9.3      2018 05:20      Creatinine Trend: 1.77<--, 1.90<--, 1.74<--, 2.00<--, 2.14<--, 2.20<--    Urinalysis Basic - ( 15 Jul 2018 10:18 )    Color: YELLOW / Appearance: CLEAR / S.008 / pH: 5.5  Gluc: 1000 / Ketone: NEGATIVE  / Bili: NEGATIVE / Urobili: NORMAL mg/dL   Blood: MODERATE / Protein: 30 mg/dL / Nitrite: NEGATIVE   Leuk Esterase: NEGATIVE / RBC: 25-50 / WBC 2-5   Sq Epi: OCC / Non Sq Epi: x / Bacteria: x            MICROBIOLOGY:     RADIOLOGY:  [ ] Reviewed and interpreted by me    EKG: CHIEF COMPLAINT: Chills and Rigors    Interval Events: No acute event overnight. Patient is seen and examined at the bedside this morning.       OBJECTIVE:  Vital Signs Last 24 Hrs  T(C): 36.7 (2018 05:34), Max: 36.9 (15 Jul 2018 21:36)  T(F): 98.1 (2018 05:34), Max: 98.5 (15 Jul 2018 21:36)  HR: 57 (2018 05:34) (57 - 60)  BP: 154/72 (2018 05:34) (120/70 - 154/72)  BP(mean): --  RR: 20 (2018 05:34) (16 - 20)  SpO2: 100% (2018 05:34) (98% - 100%)    07-15 @ 07:01  -  07-16 @ 07:00  --------------------------------------------------------  IN: 540 mL / OUT: 1050 mL / NET: -510 mL      CAPILLARY BLOOD GLUCOSE      POCT Blood Glucose.: 202 mg/dL (2018 09:17)      REVIEW OF SYSTEMS:   Constitutional: -Chills, -rigors, -drowsiness, -generalized weakness. No fever, no  	CV: No chest pain, or palpitations. No orthopnea.   	Resp: No cough, or sputum production. No shortness of breath or dyspnea on exertion.   	GI: -nausea, -vomiting. No diarrhea or constipation. No abdominal pain.   	: No dysuria, no nocturia, +increased urinary frequency.  	Musculoskeletal: No back pain, no myalgias  	Skin: No rash or itchiness, left foot  	Neurological: No headache or dizziness. No syncope, no weakness, numbness.  Psychiatric: worried about his kidney function deteriorating       HOSPITAL MEDICATIONS:  aspirin enteric coated 81 milliGRAM(s) Oral daily  clopidogrel Tablet 75 milliGRAM(s) Oral daily  heparin  Injectable 5000 Unit(s) SubCutaneous every 12 hours    atovaquone Suspension 1500 milliGRAM(s) Oral daily  piperacillin/tazobactam IVPB. 3.375 Gram(s) IV Intermittent every 12 hours  vancomycin  IVPB 1000 milliGRAM(s) IV Intermittent every 24 hours    amLODIPine   Tablet 10 milliGRAM(s) Oral daily  hydrALAZINE 75 milliGRAM(s) Oral every 8 hours  isosorbide   mononitrate ER Tablet (IMDUR) 60 milliGRAM(s) Oral daily    dextrose 40% Gel 15 Gram(s) Oral once PRN  dextrose 50% Injectable 12.5 Gram(s) IV Push once  dextrose 50% Injectable 25 Gram(s) IV Push once  dextrose 50% Injectable 25 Gram(s) IV Push once  glucagon  Injectable 1 milliGRAM(s) IntraMuscular once PRN  insulin lispro (HumaLOG) corrective regimen sliding scale   SubCutaneous three times a day before meals  insulin lispro (HumaLOG) corrective regimen sliding scale   SubCutaneous at bedtime  insulin lispro Injectable (HumaLOG) 1 Unit(s) SubCutaneous three times a day before meals      levETIRAcetam 500 milliGRAM(s) Oral two times a day    pantoprazole    Tablet 40 milliGRAM(s) Oral before breakfast        dextrose 5%. 1000 milliLiter(s) IV Continuous <Continuous>  dextrose 5%. 1000 milliLiter(s) IV Continuous <Continuous>  sodium chloride 0.9%. 1000 milliLiter(s) IV Continuous <Continuous>    mycophenolic acid  milliGRAM(s) Oral two times a day  tacrolimus 1.5 milliGRAM(s) Oral every 12 hours          LABS:                        12.4   6.38  )-----------( 131      ( 2018 05:20 )             38.7     Hgb Trend: 12.4<--, 13.1<--, 12.8<--, 12.2<--, 13.4<--  07-16    133<L>  |  100  |  19  ----------------------------<  262<H>  4.3   |  18<L>  |  1.77<H>    Ca    9.3      2018 05:20      Creatinine Trend: 1.77<--, 1.90<--, 1.74<--, 2.00<--, 2.14<--, 2.20<--    Urinalysis Basic - ( 15 Jul 2018 10:18 )    Color: YELLOW / Appearance: CLEAR / S.008 / pH: 5.5  Gluc: 1000 / Ketone: NEGATIVE  / Bili: NEGATIVE / Urobili: NORMAL mg/dL   Blood: MODERATE / Protein: 30 mg/dL / Nitrite: NEGATIVE   Leuk Esterase: NEGATIVE / RBC: 25-50 / WBC 2-5   Sq Epi: OCC / Non Sq Epi: x / Bacteria: x

## 2018-07-16 NOTE — PROGRESS NOTE ADULT - ASSESSMENT
53 year old male with history of pacemaker, ESRD s/p R renal transplant in 2014, DM with recent MRSA bacteremia being treated with daptomycin, presents with chills.  Was on Daptomycin until 7/10, planned through 8/1 for MRSA bacteremia  Here presents with fevers/chills, bandemia  BCX with GNR 1/4, [place]  CT with evidence transplant inflammation  Overall, GNR bacteremia, prior MRSA bacteremia, pyelonephritis in transplant patient  - Ceftriaxone 2g q 24  - Vancomycin 1g q 24 (trough before 4th dose)  - Plan to complete therapy for GNR bacteremia with PO abx; however needs to complete course of IV therapy for MRSA bacteremia through 8/1 as planned  - If patient agreeable, can place PICC line as long as BCX remain negative/no signs active sepsis    Leobardo Fernandez MD  Pager [phone]  After 5pm and on weekends call [phone]

## 2018-07-16 NOTE — PROGRESS NOTE ADULT - SUBJECTIVE AND OBJECTIVE BOX
CC: F/U for Bacteremia    Saw/spoke to patient. Patient overall well. No new complaints.    Allergies  dapsone (Unknown)    ANTIMICROBIALS:  atovaquone Suspension 1500 daily  piperacillin/tazobactam IVPB. 3.375 every 12 hours  vancomycin  IVPB 1000 every 24 hours    PE:    Vital Signs Last 24 Hrs  T(C): 36.7 (2018 05:34), Max: 36.9 (15 Jul 2018 21:36)  T(F): 98.1 (2018 05:34), Max: 98.5 (15 Jul 2018 21:36)  HR: 60 (2018 12:26) (57 - 60)  BP: 147/66 (2018 12:26) (120/70 - 154/72)  RR: 18 (2018 12:26) (16 - 20)  SpO2: 99% (2018 12:26) (98% - 100%)    Gen: AOx3, NAD, non-toxic, on cell phone  CV: S1+S2 normal, no murmurs, nontachycardic  Resp: Clear bilat, no resp distress, no crackles/wheezes  Abd: Soft, nontender, +BS  Ext: LE ulcer healing    LABS:                        12.4   6.38  )-----------( 131      ( 2018 05:20 )             38.7     07-16    133<L>  |  100  |  19  ----------------------------<  262<H>  4.3   |  18<L>  |  1.77<H>    Ca    9.3      2018 05:20    Urinalysis Basic - ( 15 Jul 2018 10:18 )    Color: YELLOW / Appearance: CLEAR / S.008 / pH: 5.5  Gluc: 1000 / Ketone: NEGATIVE  / Bili: NEGATIVE / Urobili: NORMAL mg/dL   Blood: MODERATE / Protein: 30 mg/dL / Nitrite: NEGATIVE   Leuk Esterase: NEGATIVE / RBC: 25-50 / WBC 2-5   Sq Epi: OCC / Non Sq Epi: x / Bacteria: x    MICROBIOLOGY:    BLOOD VENOUS  18 NGTD    BLOOD PERIPHERAL  18 NGTD    BLOOD PERIPHERAL  18 --  --  BLOOD CULTURE PCR  KLUYVERA ASCORBATA    (otherwise reviewed)    RADIOLOGY:    7/15 CT:    IMPRESSION:     Inflammatory changes right lower quadrant renal transplant.  Mild cardiomegaly.

## 2018-07-16 NOTE — PROGRESS NOTE ADULT - PROBLEM SELECTOR PLAN 2
-RICCO on CKD, consider prerenal vs ATN  -monitor  -F/U CPK -RICCO on CKD, consider prerenal vs ATN  -monitor

## 2018-07-16 NOTE — PROGRESS NOTE ADULT - PROBLEM SELECTOR PLAN 1
-Possible sources of infection include medial malleolus wound and picc line  -Started on vanco in the ED; continue. Start on cefepime; both renally dosed   -UA not concerning for a UTI  -Osteomyelitis unlikely given negative X-ray   -F/U Blood and Urine cultures  -Podiatry consulted for foot wounds, recs as below  ---foot does not appear acutely infected at this time, no need for dressing at this time  -ID consulted per nephrology's recommendation, Cefepime switched to Zosyn (7/13- ) and continuing with Vanc (7/11- ) -Possible sources of infection include medial malleolus wound and picc line  -Started on vanco in the ED; continue. Start on cefepime; both renally dosed   -UA not concerning for a UTI  -RVP negative   -Osteomyelitis unlikely given negative X-ray   -F/U Blood and Urine cultures  -Podiatry consulted for foot wounds, recs as below  ---foot does not appear acutely infected at this time, no need for dressing at this time  -ID consulted per nephrology's recommendation, Cefepime switched to Zosyn (7/13- ) and continuing with Vanc (7/11- ); f/u re length  of tx

## 2018-07-16 NOTE — PROGRESS NOTE ADULT - PROBLEM SELECTOR PLAN 7
-DVT: Heparin SubQ  -GI: Protonix      Steven Hilliard PGY3  Internal Medicine  [facility]  pager [phone] -DVT: Heparin SubQ  -GI: Protonix      Cora Meeks PGY1  Internal Medicine  [facility]  pager [phone]

## 2018-07-16 NOTE — PROGRESS NOTE ADULT - PROBLEM SELECTOR PLAN 1
Pt. with RICCO, likely hemodynamically mediated in setting of bacteremia and decreased oral intake. Scr was 1.3 on labs done on 7/2/18, increased to 2.2 on 7/11/18 and has improved to 1.77 today. Continue with IV NS @ 75 cc/hr. Check sonogram of kidney allograft or CT of Abdomen/Pelvis. Monitor BMP daily. Avoid nephrotoxins. Pt. with RICCO in setting of bacteremia and decreased oral intake. Scr was 1.3 on labs done on 7/2/18, increased to 2.2 on 7/11/18 and has improved to 1.77 today.CT abdomen shows inflammatory changes over renal transplant. Patient with hemodynamically mediated RICCO in setting of decreased PO intake and infection. Continue with IV NS @ 75 cc/hr.  Monitor BMP daily. Avoid nephrotoxins. Pt. with RICCO in setting of bacteremia and decreased oral intake. Scr was 1.3 on labs done on 7/2/18, increased to 2.2 on 7/11/18 and has improved to 1.77 today. CT abdomen shows inflammatory changes over renal transplant. Pt. on IV NS @ 75 cc/hr. Monitor BMP daily. Avoid any potential nephrotoxins

## 2018-07-16 NOTE — PROGRESS NOTE ADULT - SUBJECTIVE AND OBJECTIVE BOX
[facility] Division of Kidney Diseases & Hypertension  FOLLOW UP NOTE  [phone]--------------------------------------------------------------------------------    HPI: 53 year old man with a history of DDRT (2/2014 on tacrolimus 1.5 mg BID and mycophenolate 360 mg BID), DM2, HTN, CAD s/p PCI, left nephrectomy, subdural hematoma s/p craniotomy (prior to 2014), seizure disorder, presented to the hospital with 3 days of chills, poor PO intake, and decreased vision.3 weeks ago he was admitted to the hospital in [place] for bacteremia associated with a wound on the medial aspect of his left foot. He was discharged with IV Daptomycin and has been on it for two weeks at home with the assistance of a visiting nurse. This past week his PICC line was found to be occluded and the visiting nurse removed it.On review of previous labs, Scr ranges from 1.3-1.5.  Pt. says that he saw his transplant nephrologist Dr. Miller on 7/2/18 and Scr was 1.3 on labs done during office visit. However Pt. noted to have elevated Scr of 2.2 on labs in ER.         Today the patient was seen and examined at bedside. Patient feels well overall and denies shortness of breath, chest pain, nausea, vomiting, or diarrhea.      PAST HISTORY  --------------------------------------------------------------------------------  No significant changes to PMH, PSH, FHx, SHx, unless otherwise noted    ALLERGIES & MEDICATIONS  --------------------------------------------------------------------------------  Allergies    dapsone (Unknown)    Intolerances      Standing Inpatient Medications  amLODIPine   Tablet 10 milliGRAM(s) Oral daily  aspirin enteric coated 81 milliGRAM(s) Oral daily  atovaquone Suspension 1500 milliGRAM(s) Oral daily  clopidogrel Tablet 75 milliGRAM(s) Oral daily  dextrose 5%. 1000 milliLiter(s) IV Continuous <Continuous>  dextrose 5%. 1000 milliLiter(s) IV Continuous <Continuous>  dextrose 50% Injectable 12.5 Gram(s) IV Push once  dextrose 50% Injectable 25 Gram(s) IV Push once  dextrose 50% Injectable 25 Gram(s) IV Push once  heparin  Injectable 5000 Unit(s) SubCutaneous every 12 hours  hydrALAZINE 75 milliGRAM(s) Oral every 8 hours  insulin lispro (HumaLOG) corrective regimen sliding scale   SubCutaneous three times a day before meals  insulin lispro (HumaLOG) corrective regimen sliding scale   SubCutaneous at bedtime  insulin lispro Injectable (HumaLOG) 1 Unit(s) SubCutaneous three times a day before meals  isosorbide   mononitrate ER Tablet (IMDUR) 60 milliGRAM(s) Oral daily  levETIRAcetam 500 milliGRAM(s) Oral two times a day  mycophenolic acid  milliGRAM(s) Oral two times a day  pantoprazole    Tablet 40 milliGRAM(s) Oral before breakfast  piperacillin/tazobactam IVPB. 3.375 Gram(s) IV Intermittent every 12 hours  sodium chloride 0.9%. 1000 milliLiter(s) IV Continuous <Continuous>  tacrolimus 1.5 milliGRAM(s) Oral every 12 hours  vancomycin  IVPB 1000 milliGRAM(s) IV Intermittent every 24 hours    PRN Inpatient Medications  dextrose 40% Gel 15 Gram(s) Oral once PRN  glucagon  Injectable 1 milliGRAM(s) IntraMuscular once PRN      REVIEW OF SYSTEMS  --------------------------------------------------------------------------------  Gen: no fever  Respiratory: No dyspnea  CV: No chest pain  GI: No abdominal pain  MSK: No LE edema  Neuro: No dizziness  Heme: No bleeding    All other systems were reviewed and are negative, except as noted.    VITALS/PHYSICAL EXAM  --------------------------------------------------------------------------------  T(C): 36.7 (07-16-18 @ 05:34), Max: 36.9 (07-15-18 @ 21:36)  HR: 60 (07-16-18 @ 12:26) (57 - 60)  BP: 147/66 (07-16-18 @ 12:26) (120/70 - 154/72)  RR: 18 (07-16-18 @ 12:26) (16 - 20)  SpO2: 99% (07-16-18 @ 12:26) (98% - 100%)  Wt(kg): --        07-15-18 @ 07:01  -  07-16-18 @ 07:00  --------------------------------------------------------  IN: 540 mL / OUT: 1050 mL / NET: -510 mL    07-16-18 @ 07:01  -  07-16-18 @ 13:06  --------------------------------------------------------  IN: 0 mL / OUT: 200 mL / NET: -200 mL      Physical Exam:  	  Gen: resting, NAD  	HEENT: No JVD  	Pulm: CTA B/L  	CV: S1S2+  	Abd: Soft, +BS   	Ext:  Healing wound on the medial aspect of the left foot. No LE edema B/L  	Neuro: Awake  	Skin: Warm and dry  	Vascular access: LUE AVF: +thrill    LABS/STUDIES  --------------------------------------------------------------------------------              12.4   6.38  >-----------<  131      [07-16-18 @ 05:20]              38.7     133  |  100  |  19  ----------------------------<  262      [07-16-18 @ 05:20]  4.3   |  18  |  1.77        Ca     9.3     [07-16-18 @ 05:20]            Creatinine Trend:  SCr 1.77 [07-16 @ 05:20]  SCr 1.90 [07-15 @ 05:35]  SCr 1.74 [07-14 @ 05:55]  SCr 2.00 [07-13 @ 05:30]  SCr 2.14 [07-12 @ 04:45]    Urinalysis - [07-15-18 @ 10:18]      Color YELLOW / Appearance CLEAR / SG 1.008 / pH 5.5      Gluc 1000 / Ketone NEGATIVE  / Bili NEGATIVE / Urobili NORMAL       Blood MODERATE / Protein 30 / Leuk Est NEGATIVE / Nitrite NEGATIVE      RBC 25-50 / WBC 2-5 / Hyaline  / Gran  / Sq Epi OCC / Non Sq Epi  / Bacteria     Urine Creatinine 67.90      [07-15-18 @ 08:18]  Urine Sodium 47      [07-15-18 @ 05:38]  Urine Urea Nitrogen 409.2      [07-15-18 @ 09:00]  Urine Osmolality 313      [07-15-18 @ 05:38]    HbA1c 6.8      [07-12-18 @ 04:45]      C3 Complement 139.3      [07-16-18 @ 05:20]  C4 Complement 32.6      [07-16-18 @ 05:20] [facility] Division of Kidney Diseases & Hypertension  FOLLOW UP NOTE  [phone]--------------------------------------------------------------------------------    HPI: 53 year old man with a history of DDRT (2/2014 on tacrolimus 1.5 mg BID and mycophenolate 360 mg BID), DM2, HTN, CAD s/p PCI, left nephrectomy, subdural hematoma s/p craniotomy (prior to 2014), seizure disorder, presented to the hospital with 3 days of chills, poor PO intake, and decreased vision. Approximately 3.5 weeks ago he was admitted to the hospital in [place] for bacteremia associated with a wound on the medial aspect of his left foot. He was discharged with IV Daptomycin and has been on it for two weeks at home with the assistance of a visiting nurse. This past week his PICC line was found to be occluded and the visiting nurse removed it. On review of previous labs, Scr ranges from 1.3-1.5.  Pt. says that he saw his transplant nephrologist Dr. Miller on 7/2/18 and Scr was 1.3 on labs done during office visit. However Pt. found to have elevated Scr of 2.2 on labs in ER.  Patient was seen and examined at bedside today. Patient feels well overall and denies shortness of breath, chest pain, nausea, vomiting, or diarrhea.    PAST HISTORY  --------------------------------------------------------------------------------  No significant changes to PMH, PSH, FHx, SHx, unless otherwise noted    ALLERGIES & MEDICATIONS  --------------------------------------------------------------------------------  Allergies    dapsone (Unknown)    Intolerances    Standing Inpatient Medications  amLODIPine   Tablet 10 milliGRAM(s) Oral daily  aspirin enteric coated 81 milliGRAM(s) Oral daily  atovaquone Suspension 1500 milliGRAM(s) Oral daily  clopidogrel Tablet 75 milliGRAM(s) Oral daily  dextrose 5%. 1000 milliLiter(s) IV Continuous <Continuous>  dextrose 5%. 1000 milliLiter(s) IV Continuous <Continuous>  dextrose 50% Injectable 12.5 Gram(s) IV Push once  dextrose 50% Injectable 25 Gram(s) IV Push once  dextrose 50% Injectable 25 Gram(s) IV Push once  heparin  Injectable 5000 Unit(s) SubCutaneous every 12 hours  hydrALAZINE 75 milliGRAM(s) Oral every 8 hours  insulin lispro (HumaLOG) corrective regimen sliding scale   SubCutaneous three times a day before meals  insulin lispro (HumaLOG) corrective regimen sliding scale   SubCutaneous at bedtime  insulin lispro Injectable (HumaLOG) 1 Unit(s) SubCutaneous three times a day before meals  isosorbide   mononitrate ER Tablet (IMDUR) 60 milliGRAM(s) Oral daily  levETIRAcetam 500 milliGRAM(s) Oral two times a day  mycophenolic acid  milliGRAM(s) Oral two times a day  pantoprazole    Tablet 40 milliGRAM(s) Oral before breakfast  piperacillin/tazobactam IVPB. 3.375 Gram(s) IV Intermittent every 12 hours  sodium chloride 0.9%. 1000 milliLiter(s) IV Continuous <Continuous>  tacrolimus 1.5 milliGRAM(s) Oral every 12 hours  vancomycin  IVPB 1000 milliGRAM(s) IV Intermittent every 24 hours    REVIEW OF SYSTEMS  --------------------------------------------------------------------------------  Gen: no fever  Respiratory: No dyspnea  CV: No chest pain  GI: No abdominal pain  MSK: No LE edema  Neuro: No dizziness  Heme: No bleeding    All other systems were reviewed and are negative, except as noted.    VITALS/PHYSICAL EXAM  --------------------------------------------------------------------------------  T(C): 36.7 (07-16-18 @ 05:34), Max: 36.9 (07-15-18 @ 21:36)  HR: 60 (07-16-18 @ 12:26) (57 - 60)  BP: 147/66 (07-16-18 @ 12:26) (120/70 - 154/72)  RR: 18 (07-16-18 @ 12:26) (16 - 20)  SpO2: 99% (07-16-18 @ 12:26) (98% - 100%)  Wt(kg): -    07-15-18 @ 07:01  -  07-16-18 @ 07:00  --------------------------------------------------------  IN: 540 mL / OUT: 1050 mL / NET: -510 mL    07-16-18 @ 07:01  -  07-16-18 @ 13:06  --------------------------------------------------------  IN: 0 mL / OUT: 200 mL / NET: -200 mL    Physical Exam:  	Gen: resting, NAD  	HEENT: No JVD  	Pulm: CTA B/L  	CV: S1S2+  	Abd: Soft, +BS   	Ext:  Healing wound on the medial aspect of the left foot. No LE edema B/L  	Neuro: Awake  	Skin: Warm and dry  	Vascular access: HUSEYIN ARGUETAF: +thrill    LABS/STUDIES  --------------------------------------------------------------------------------              12.4   6.38  >-----------<  131      [07-16-18 @ 05:20]              38.7     133  |  100  |  19  ----------------------------<  262      [07-16-18 @ 05:20]  4.3   |  18  |  1.77        Ca     9.3     [07-16-18 @ 05:20]    Creatinine Trend:  SCr 1.77 [07-16 @ 05:20]  SCr 1.90 [07-15 @ 05:35]  SCr 1.74 [07-14 @ 05:55]  SCr 2.00 [07-13 @ 05:30]  SCr 2.14 [07-12 @ 04:45]    Urinalysis - [07-15-18 @ 10:18]      Color YELLOW / Appearance CLEAR / SG 1.008 / pH 5.5      Gluc 1000 / Ketone NEGATIVE  / Bili NEGATIVE / Urobili NORMAL       Blood MODERATE / Protein 30 / Leuk Est NEGATIVE / Nitrite NEGATIVE      RBC 25-50 / WBC 2-5 / Hyaline  / Gran  / Sq Epi OCC / Non Sq Epi  / Bacteria     Urine Creatinine 67.90      [07-15-18 @ 08:18]  Urine Sodium 47      [07-15-18 @ 05:38]  Urine Urea Nitrogen 409.2      [07-15-18 @ 09:00]  Urine Osmolality 313      [07-15-18 @ 05:38]    C3 Complement 139.3      [07-16-18 @ 05:20]  C4 Complement 32.6      [07-16-18 @ 05:20]

## 2018-07-16 NOTE — PROGRESS NOTE ADULT - PROBLEM SELECTOR PLAN 3
-Continue Atovaquone, Mycophenolic acid and Tacrolimus  -F/U Tacrolimus levels  -Nephrology consulted, appreciate recs  Renal US pending -Continue Atovaquone, Mycophenolic acid and Tacrolimus  -F/U Tacrolimus levels  -Nephrology consulted, appreciate recs  Renal US pending  -CTAP: No perinephric collection or hydronephrosis

## 2018-07-16 NOTE — PROGRESS NOTE ADULT - PROBLEM SELECTOR PLAN 2
Patient with h/o DDRT in 2014 and is on tacrolimus 1.5 mg BID and mycophenolate 720 mg BID. Continue with tacrolimus and reduced mycophenolate dose (360 mg BID). Follow up 12 hour tacrolimus trough level. Patient with DDRT in 2014 and is on tacrolimus 1.5 mg BID and mycophenolate 720 mg BID (as outpatient). Continue with tacrolimus and reduced mycophenolate dose (360 mg BID). Follow up 12 hour tacrolimus trough level

## 2018-07-17 ENCOUNTER — CLINICAL ADVICE (OUTPATIENT)
Age: 53
End: 2018-07-17

## 2018-07-17 LAB
BUN SERPL-MCNC: 17 MG/DL — SIGNIFICANT CHANGE UP (ref 7–23)
CALCIUM SERPL-MCNC: 9.3 MG/DL — SIGNIFICANT CHANGE UP (ref 8.4–10.5)
CHLORIDE SERPL-SCNC: 100 MMOL/L — SIGNIFICANT CHANGE UP (ref 98–107)
CO2 SERPL-SCNC: 19 MMOL/L — LOW (ref 22–31)
CREAT SERPL-MCNC: 1.58 MG/DL — HIGH (ref 0.5–1.3)
GLUCOSE SERPL-MCNC: 230 MG/DL — HIGH (ref 70–99)
HCT VFR BLD CALC: 40 % — SIGNIFICANT CHANGE UP (ref 39–50)
HGB BLD-MCNC: 12.7 G/DL — LOW (ref 13–17)
MCHC RBC-ENTMCNC: 26.5 PG — LOW (ref 27–34)
MCHC RBC-ENTMCNC: 31.8 % — LOW (ref 32–36)
MCV RBC AUTO: 83.5 FL — SIGNIFICANT CHANGE UP (ref 80–100)
NRBC # FLD: 0 — SIGNIFICANT CHANGE UP
PLATELET # BLD AUTO: 139 K/UL — LOW (ref 150–400)
PMV BLD: 12.4 FL — SIGNIFICANT CHANGE UP (ref 7–13)
POTASSIUM SERPL-MCNC: 4.4 MMOL/L — SIGNIFICANT CHANGE UP (ref 3.5–5.3)
POTASSIUM SERPL-SCNC: 4.4 MMOL/L — SIGNIFICANT CHANGE UP (ref 3.5–5.3)
RBC # BLD: 4.79 M/UL — SIGNIFICANT CHANGE UP (ref 4.2–5.8)
RBC # FLD: 14.3 % — SIGNIFICANT CHANGE UP (ref 10.3–14.5)
SODIUM SERPL-SCNC: 133 MMOL/L — LOW (ref 135–145)
TACROLIMUS SERPL-MCNC: 7 NG/ML — SIGNIFICANT CHANGE UP
VANCOMYCIN TROUGH SERPL-MCNC: 14.5 UG/ML — SIGNIFICANT CHANGE UP (ref 10–20)
WBC # BLD: 7.3 K/UL — SIGNIFICANT CHANGE UP (ref 3.8–10.5)
WBC # FLD AUTO: 7.3 K/UL — SIGNIFICANT CHANGE UP (ref 3.8–10.5)

## 2018-07-17 PROCEDURE — 99232 SBSQ HOSP IP/OBS MODERATE 35: CPT | Mod: GC

## 2018-07-17 PROCEDURE — 99232 SBSQ HOSP IP/OBS MODERATE 35: CPT

## 2018-07-17 PROCEDURE — 99233 SBSQ HOSP IP/OBS HIGH 50: CPT | Mod: GC

## 2018-07-17 RX ADMIN — MYCOPHENOLIC ACID 360 MILLIGRAM(S): 180 TABLET, DELAYED RELEASE ORAL at 05:37

## 2018-07-17 RX ADMIN — Medication 4: at 18:28

## 2018-07-17 RX ADMIN — ISOSORBIDE MONONITRATE 60 MILLIGRAM(S): 60 TABLET, EXTENDED RELEASE ORAL at 13:36

## 2018-07-17 RX ADMIN — Medication 1 UNIT(S): at 18:28

## 2018-07-17 RX ADMIN — Medication 75 MILLIGRAM(S): at 13:36

## 2018-07-17 RX ADMIN — HEPARIN SODIUM 5000 UNIT(S): 5000 INJECTION INTRAVENOUS; SUBCUTANEOUS at 05:42

## 2018-07-17 RX ADMIN — Medication 250 MILLIGRAM(S): at 19:58

## 2018-07-17 RX ADMIN — TACROLIMUS 1.5 MILLIGRAM(S): 5 CAPSULE ORAL at 18:28

## 2018-07-17 RX ADMIN — Medication 1 UNIT(S): at 09:13

## 2018-07-17 RX ADMIN — TACROLIMUS 1.5 MILLIGRAM(S): 5 CAPSULE ORAL at 05:38

## 2018-07-17 RX ADMIN — ATOVAQUONE 1500 MILLIGRAM(S): 750 SUSPENSION ORAL at 13:34

## 2018-07-17 RX ADMIN — Medication 1 UNIT(S): at 13:10

## 2018-07-17 RX ADMIN — PANTOPRAZOLE SODIUM 40 MILLIGRAM(S): 20 TABLET, DELAYED RELEASE ORAL at 05:38

## 2018-07-17 RX ADMIN — Medication 81 MILLIGRAM(S): at 13:34

## 2018-07-17 RX ADMIN — MYCOPHENOLIC ACID 360 MILLIGRAM(S): 180 TABLET, DELAYED RELEASE ORAL at 18:28

## 2018-07-17 RX ADMIN — LEVETIRACETAM 500 MILLIGRAM(S): 250 TABLET, FILM COATED ORAL at 05:38

## 2018-07-17 RX ADMIN — Medication 3: at 13:10

## 2018-07-17 RX ADMIN — CEFTRIAXONE 100 GRAM(S): 500 INJECTION, POWDER, FOR SOLUTION INTRAMUSCULAR; INTRAVENOUS at 18:27

## 2018-07-17 RX ADMIN — Medication 2: at 09:13

## 2018-07-17 RX ADMIN — HEPARIN SODIUM 5000 UNIT(S): 5000 INJECTION INTRAVENOUS; SUBCUTANEOUS at 18:51

## 2018-07-17 RX ADMIN — Medication 4: at 22:57

## 2018-07-17 RX ADMIN — LEVETIRACETAM 500 MILLIGRAM(S): 250 TABLET, FILM COATED ORAL at 18:27

## 2018-07-17 RX ADMIN — AMLODIPINE BESYLATE 10 MILLIGRAM(S): 2.5 TABLET ORAL at 05:37

## 2018-07-17 RX ADMIN — CLOPIDOGREL BISULFATE 75 MILLIGRAM(S): 75 TABLET, FILM COATED ORAL at 13:35

## 2018-07-17 RX ADMIN — Medication 75 MILLIGRAM(S): at 22:57

## 2018-07-17 RX ADMIN — Medication 75 MILLIGRAM(S): at 05:37

## 2018-07-17 NOTE — PROGRESS NOTE ADULT - PROBLEM SELECTOR PLAN 2
Patient with DDRT in 2014 and is on tacrolimus 1.5 mg BID and mycophenolate 720 mg BID (as outpatient). Continue with tacrolimus and reduced mycophenolate dose (360 mg BID). Tacrolimus trough level of 7.0. Follow up 12 hour tacrolimus trough level Patient with DDRT in 2014 and is on tacrolimus 1.5 mg BID and mycophenolate 720 mg BID (as outpatient). Continue with tacrolimus and reduced mycophenolate dose (360 mg BID). Pt. with tacrolimus trough level of 7.0. Monitor 12 hour tacrolimus trough level

## 2018-07-17 NOTE — CHART NOTE - NSCHARTNOTEFT_GEN_A_CORE
PRE-INTERVENTIONAL RADIOLOGY PROCEDURE NOTE      Patient Age: 53    Patient Gender: Male    Procedure:  Picc line placements    Diagnosis/Indication: IV antibiotics for MRSA bacteremia    Interventional Radiology Attending Physician:    Ordering Attending Physician: TERRANCE Tatum    Pertinent Medical History: Recent hx of MRSA bacteremia now with GNR bacteremia    Pertinent labs:                      12.7   7.30  )-----------( 139      ( 17 Jul 2018 05:30 )             40.0       07-17    133<L>  |  100  |  17  ----------------------------<  230<H>  4.4   |  19<L>  |  1.58<H>    Ca    9.3      17 Jul 2018 05:30                Patient and Family Aware ? Yes

## 2018-07-17 NOTE — PROGRESS NOTE ADULT - PROBLEM SELECTOR PLAN 1
Pt. with RICCO in setting of bacteremia and decreased oral intake. Scr was 1.3 on labs done on 7/2/18, increased to 2.2 on 7/11/18 and has improved to 1.58 today. CT abdomen shows inflammatory changes over renal transplant. Monitor BMP daily. Avoid any potential nephrotoxins Pt. with RICCO in setting of bacteremia and decreased oral intake. Scr was 1.3 on labs done on 7/2/18, increased to 2.2 on 7/11/18 and has improved to 1.58 today. Monitor BMP daily. Avoid any potential nephrotoxins

## 2018-07-17 NOTE — PROGRESS NOTE ADULT - PROBLEM SELECTOR PLAN 6
-hold home losartan  -continue isosorbide mononitrate 60mg; will increase to 90 if BP not well controlled  -c/w hydralazine 75mg, Amlodipine increased to 10mg -hold home losartan  -continue isosorbide mononitrate 60mg; will increase to 90 if BP not well controlled  -c/w hydralazine 75mg, Amlodipine 10mg

## 2018-07-17 NOTE — PROGRESS NOTE ADULT - PROBLEM SELECTOR PLAN 3
-Continue Atovaquone, Mycophenolic acid and Tacrolimus  -F/U Tacrolimus levels  -Nephrology consulted, appreciate recs  Renal US pending  -CTAP: No perinephric collection or hydronephrosis -Continue Atovaquone, Mycophenolic acid and Tacrolimus  -F/U Tacrolimus levels  -Nephrology consulted, appreciate recs    -CTAP: No perinephric collection or hydronephrosis

## 2018-07-17 NOTE — PROGRESS NOTE ADULT - ASSESSMENT
53 year old male with history of pacemaker, ESRD s/p R renal transplant in 2014, DM with recent MRSA bacteremia being treated with daptomycin, presents with chills.  Was on Daptomycin until 7/10, planned through 8/1 for MRSA bacteremia  Here presents with fevers/chills, bandemia  BCX with GNR 1/4, [place]  CT with evidence transplant inflammation  Overall, GNR bacteremia, prior MRSA bacteremia, pyelonephritis in transplant patient  - Ceftriaxone 2g q 24 through 7/26/18  - Vancomycin 1g q 24 (trough before next dose) through 8/1/18  - Okay for PICC as long as no signs active sepsis  - If for home IV abx, I will manage outpatient abx; Would check CBC, CMP, Vanco trough weekly while on IV abx, fax to [phone]  - Follow up in ID clinic 1st week of August  - Will sign off. Please call with further questions or change in status.    Leobardo Fernandez MD  Pager [phone]  After 5pm and on weekends call [phone]

## 2018-07-17 NOTE — PROGRESS NOTE ADULT - ASSESSMENT
53 year old man with a history of DDRT (2014), CAD, DM2, HTN, siezure admitted with bactermia and RICCO.

## 2018-07-17 NOTE — PROGRESS NOTE ADULT - PROBLEM SELECTOR PLAN 4
-AIC at outside facilty 6/26/18 was 7  -c/w ISS  -Podiatry consult appreciated -AIC at outside facilty 6/26/18 was 7  -c/w ISS  -Podiatry consulted

## 2018-07-17 NOTE — PROGRESS NOTE ADULT - SUBJECTIVE AND OBJECTIVE BOX
CC: F/U for Bacteremia    Saw/spoke to patient. No fevers, no chills. Overall well. no new complaints.    Allergies  dapsone (Unknown)    ANTIMICROBIALS:  atovaquone Suspension 1500 daily  cefTRIAXone   IVPB 2 every 24 hours  cefTRIAXone   IVPB    vancomycin  IVPB 1000 every 24 hours    PE:    Vital Signs Last 24 Hrs  T(C): 37.2 (17 Jul 2018 13:53), Max: 37.2 (17 Jul 2018 13:53)  T(F): 99 (17 Jul 2018 13:53), Max: 99 (17 Jul 2018 13:53)  HR: 79 (17 Jul 2018 13:53) (59 - 79)  BP: 159/74 (17 Jul 2018 13:53) (154/71 - 159/74)  RR: 18 (17 Jul 2018 13:53) (18 - 18)  SpO2: 99% (17 Jul 2018 13:53) (98% - 99%)    Gen: AOx3, NAD, non-toxic, slightly agitated  CV: S1+S2 normal, no murmurs, nontachycardic  Resp: Clear bilat, no resp distress, no crackles/wheezes  Abd: Soft, nontender, +BS  Ext: No LE edema, no wounds    LABS:                        12.7   7.30  )-----------( 139      ( 17 Jul 2018 05:30 )             40.0     07-17    133<L>  |  100  |  17  ----------------------------<  230<H>  4.4   |  19<L>  |  1.58<H>    Ca    9.3      17 Jul 2018 05:30    MICROBIOLOGY:    BLOOD VENOUS  07-13-18 NGTD    BLOOD PERIPHERAL  07-11-18 --  --  BLOOD CULTURE PCR  KLUYVERA ASCORBATA    (otherwise reviewed)    RADIOLOGY:    No new available

## 2018-07-17 NOTE — PROGRESS NOTE ADULT - SUBJECTIVE AND OBJECTIVE BOX
CHIEF COMPLAINT: Chills and Rigors    Interval Events: No acute event overnight. Patient is seen and examined at the bedside this morning.   -patient reports that he feels like he is now back at     OBJECTIVE:  Vital Signs Last 24 Hrs  T(C): 36.8 (17 Jul 2018 05:34), Max: 36.9 (16 Jul 2018 14:30)  T(F): 98.3 (17 Jul 2018 05:34), Max: 98.5 (16 Jul 2018 14:30)  HR: 59 (17 Jul 2018 05:34) (59 - 60)  BP: 155/71 (17 Jul 2018 05:34) (147/66 - 155/71)  BP(mean): --  RR: 18 (17 Jul 2018 05:34) (18 - 18)  SpO2: 98% (17 Jul 2018 05:34) (98% - 99%)    07-16 @ 07:01  -  07-17 @ 07:00  --------------------------------------------------------  IN: 600 mL / OUT: 750 mL / NET: -150 mL    07-17 @ 07:01  -  07-17 @ 10:45  --------------------------------------------------------  IN: 0 mL / OUT: 300 mL / NET: -300 mL      CAPILLARY BLOOD GLUCOSE      POCT Blood Glucose.: 213 mg/dL (17 Jul 2018 09:00)      PHYSICAL EXAM:  General: Alert and cooperative. Not in acute stress. Well developed, well nourished.   Head: Normocephalic, no mass and lesions.  Eyes: Intact visual fields. PERRLA, clear conjunctiva. EOMI, no ptosis.   Throat: Oral cavity and pharynx normal. No inflammation, swelling, exudate, or lesions. Teeth and gingiva in good general condition.  Neck: No lymphadenopathy, no masses, no thyromegaly. Carotid pulses 2+. No JVD.   Respiratory: Bilateral lung clear to auscultation, no crackles, no wheezes, no rhonchi.   Cardiovascular: S1/S2 auscultated, no murmur, or gallop. Rhythm is regular. There is no peripheral edema, cyanosis or pallor. Extremities are warm and well perfused. Capillary refill is less than 2 seconds. No carotid bruits.  Abdomen: Soft, non-tender, nondistended, no guarding or rebound tenderness. Active bowel sounds in all 4 quadrants. No hepatosplenomegaly.   Musculoskeletal: Adequately aligned spine. ROM intact spine and extremities. No joint erythema or tenderness. Normal muscular development. Normal gait.   Extremities: No significant deformity or joint abnormality. Peripheral pulses intact. No varicosities. No peripheral edema, atrophy.   Skin: Intact, no rash. Normal color, texture and turgor with no lesions or eruptions.  Neurological: AOAx4. CN2-12 grosslly intact. Strength and sensation symmetric and intact throughout. Reflexes 2+ throughout. Cerebellar testing normal.  Psychiatry: The mental examination revealed the patient was oriented to person, place, and time. The patient was able to demonstrate good judgement and reason, without hallucinations, abnormal affect or abnormal behaviors during the examination. Patient is not suicidal.     LINES:    HOSPITAL MEDICATIONS:  aspirin enteric coated 81 milliGRAM(s) Oral daily  clopidogrel Tablet 75 milliGRAM(s) Oral daily  heparin  Injectable 5000 Unit(s) SubCutaneous every 12 hours    atovaquone Suspension 1500 milliGRAM(s) Oral daily  cefTRIAXone   IVPB 2 Gram(s) IV Intermittent every 24 hours  cefTRIAXone   IVPB      vancomycin  IVPB 1000 milliGRAM(s) IV Intermittent every 24 hours    amLODIPine   Tablet 10 milliGRAM(s) Oral daily  hydrALAZINE 75 milliGRAM(s) Oral every 8 hours  isosorbide   mononitrate ER Tablet (IMDUR) 60 milliGRAM(s) Oral daily    dextrose 40% Gel 15 Gram(s) Oral once PRN  dextrose 50% Injectable 12.5 Gram(s) IV Push once  dextrose 50% Injectable 25 Gram(s) IV Push once  dextrose 50% Injectable 25 Gram(s) IV Push once  glucagon  Injectable 1 milliGRAM(s) IntraMuscular once PRN  insulin lispro (HumaLOG) corrective regimen sliding scale   SubCutaneous three times a day before meals  insulin lispro (HumaLOG) corrective regimen sliding scale   SubCutaneous at bedtime  insulin lispro Injectable (HumaLOG) 1 Unit(s) SubCutaneous three times a day before meals      levETIRAcetam 500 milliGRAM(s) Oral two times a day    pantoprazole    Tablet 40 milliGRAM(s) Oral before breakfast        dextrose 5%. 1000 milliLiter(s) IV Continuous <Continuous>  dextrose 5%. 1000 milliLiter(s) IV Continuous <Continuous>    mycophenolic acid  milliGRAM(s) Oral two times a day  tacrolimus 1.5 milliGRAM(s) Oral every 12 hours          LABS:                        12.7   7.30  )-----------( 139      ( 17 Jul 2018 05:30 )             40.0     Hgb Trend: 12.7<--, 12.4<--, 13.1<--, 12.8<--, 12.2<--  07-17    133<L>  |  100  |  17  ----------------------------<  230<H>  4.4   |  19<L>  |  1.58<H>    Ca    9.3      17 Jul 2018 05:30      Creatinine Trend: 1.58<--, 1.77<--, 1.90<--, 1.74<--, 2.00<--, 2.14<--            MICROBIOLOGY:     RADIOLOGY:  [ ] Reviewed and interpreted by me    EKG: CHIEF COMPLAINT: Chills and Rigors    Interval Events: No acute event overnight. Patient is seen and examined at the bedside this morning.   -patient reports that he feels like he is now back at his baseline state of health  -patient refused picc line needed for continued outpatient antibiotics for his MRSA bacteremia.     Review of Systems   Constitutional: -Chills, -rigors, -drowsiness, -generalized weakness. No fever, no  	CV: No chest pain, or palpitations. No orthopnea.   	Resp: No cough, or sputum production. No shortness of breath or dyspnea on exertion.   	GI: -nausea, -vomiting. No diarrhea or constipation. No abdominal pain.   	: No dysuria, no nocturia, +increased urinary frequency.  	Musculoskeletal: No back pain, no myalgias  	Skin: No rash or itchiness, left foot  	Neurological: No headache or dizziness. No syncope, no weakness, numbness.      OBJECTIVE:  Vital Signs Last 24 Hrs  T(C): 36.8 (17 Jul 2018 05:34), Max: 36.9 (16 Jul 2018 14:30)  T(F): 98.3 (17 Jul 2018 05:34), Max: 98.5 (16 Jul 2018 14:30)  HR: 59 (17 Jul 2018 05:34) (59 - 60)  BP: 155/71 (17 Jul 2018 05:34) (147/66 - 155/71)  BP(mean): --  RR: 18 (17 Jul 2018 05:34) (18 - 18)  SpO2: 98% (17 Jul 2018 05:34) (98% - 99%)    07-16 @ 07:01 - 07-17 @ 07:00  --------------------------------------------------------  IN: 600 mL / OUT: 750 mL / NET: -150 mL    07-17 @ 07:01 - 07-17 @ 10:45  --------------------------------------------------------  IN: 0 mL / OUT: 300 mL / NET: -300 mL      CAPILLARY BLOOD GLUCOSE      POCT Blood Glucose.: 213 mg/dL (17 Jul 2018 09:00)      PHYSICAL EXAM:  General: Alert and cooperative. Not in acute stress. Well developed, well nourished.   	Head: Normocephalic,  	Eyes: Decreased vison, clear conjunctiva. EOMI, no ptosis.   	Neck: No lymphadenopathy, no masses, no thyromegaly.   	Respiratory: Bilateral lung clear to auscultation, no crackles, no wheezes, no rhonchi.   	Cardiovascular: S1/S2 auscultated, no murmur, or gallop. Rhythm is regular. There is no peripheral edema, cyanosis or pallor. Extremities are warm and well perfused.   	Abdomen: Soft, non-tender, nondistended, no guarding or rebound tenderness. Active bowel sounds in all 4 quadrants.   	Musculoskeletal: Adequately aligned spine. ROM intact spine and extremities. Healing wound on left medial malleolus and small area of necrosis on left toe  Neurological: AOAx4. CN2-12 grosslly intact. Decreased sensation in bilateral feet      LINES:    HOSPITAL MEDICATIONS:  aspirin enteric coated 81 milliGRAM(s) Oral daily  clopidogrel Tablet 75 milliGRAM(s) Oral daily  heparin  Injectable 5000 Unit(s) SubCutaneous every 12 hours    atovaquone Suspension 1500 milliGRAM(s) Oral daily  cefTRIAXone   IVPB 2 Gram(s) IV Intermittent every 24 hours  cefTRIAXone   IVPB      vancomycin  IVPB 1000 milliGRAM(s) IV Intermittent every 24 hours    amLODIPine   Tablet 10 milliGRAM(s) Oral daily  hydrALAZINE 75 milliGRAM(s) Oral every 8 hours  isosorbide   mononitrate ER Tablet (IMDUR) 60 milliGRAM(s) Oral daily    dextrose 40% Gel 15 Gram(s) Oral once PRN  dextrose 50% Injectable 12.5 Gram(s) IV Push once  dextrose 50% Injectable 25 Gram(s) IV Push once  dextrose 50% Injectable 25 Gram(s) IV Push once  glucagon  Injectable 1 milliGRAM(s) IntraMuscular once PRN  insulin lispro (HumaLOG) corrective regimen sliding scale   SubCutaneous three times a day before meals  insulin lispro (HumaLOG) corrective regimen sliding scale   SubCutaneous at bedtime  insulin lispro Injectable (HumaLOG) 1 Unit(s) SubCutaneous three times a day before meals      levETIRAcetam 500 milliGRAM(s) Oral two times a day    pantoprazole    Tablet 40 milliGRAM(s) Oral before breakfast        dextrose 5%. 1000 milliLiter(s) IV Continuous <Continuous>  dextrose 5%. 1000 milliLiter(s) IV Continuous <Continuous>    mycophenolic acid  milliGRAM(s) Oral two times a day  tacrolimus 1.5 milliGRAM(s) Oral every 12 hours          LABS:                        12.7   7.30  )-----------( 139      ( 17 Jul 2018 05:30 )             40.0     Hgb Trend: 12.7<--, 12.4<--, 13.1<--, 12.8<--, 12.2<--  07-17    133<L>  |  100  |  17  ----------------------------<  230<H>  4.4   |  19<L>  |  1.58<H>    Ca    9.3      17 Jul 2018 05:30      Creatinine Trend: 1.58<--, 1.77<--, 1.90<--, 1.74<--, 2.00<--, 2.14<--          EKG:

## 2018-07-17 NOTE — PROGRESS NOTE ADULT - PROBLEM SELECTOR PLAN 1
-Possible sources of infection include medial malleolus wound and picc line  -Started on vanco in the ED; continue. Start on cefepime; both renally dosed   -UA not concerning for a UTI  -RVP negative   -Osteomyelitis unlikely given negative X-ray   -F/U Blood and Urine cultures  -Podiatry consulted for foot wounds, recs as below  ---foot does not appear acutely infected at this time, no need for dressing at this time  -ID consulted per nephrology's recommendation, Cefepime switched to Zosyn (7/13- ) and continuing with Vanc (7/11- ); f/u re length  of tx -Possible sources of infection include medial malleolus wound and picc line  -Started on vanco in the ED; continue. Start on cefepime; both renally dosed   -UA not concerning for a UTI  -RVP negative   -Osteomyelitis unlikely given negative X-ray   -F/U Blood and Urine cultures  -Podiatry consulted for foot wounds, recs as below  ---foot does not appear acutely infected at this time, no need for dressing at this time  -ID consulted per nephrology's recommendation, Cefepime switched to Zosyn (7/13- ) and continuing with Vanc (7/11- ); Patient to be switched to PO to complete treatment for GNR, and then picc line to be placed for vanc treatment until 8/1/18

## 2018-07-18 ENCOUNTER — TRANSCRIPTION ENCOUNTER (OUTPATIENT)
Age: 53
End: 2018-07-18

## 2018-07-18 VITALS — WEIGHT: 184.97 LBS

## 2018-07-18 LAB
BACTERIA BLD CULT: SIGNIFICANT CHANGE UP
BACTERIA BLD CULT: SIGNIFICANT CHANGE UP
BLD GP AB SCN SERPL QL: NEGATIVE — SIGNIFICANT CHANGE UP
BUN SERPL-MCNC: 21 MG/DL — SIGNIFICANT CHANGE UP (ref 7–23)
CALCIUM SERPL-MCNC: 9.8 MG/DL — SIGNIFICANT CHANGE UP (ref 8.4–10.5)
CHLORIDE SERPL-SCNC: 98 MMOL/L — SIGNIFICANT CHANGE UP (ref 98–107)
CO2 SERPL-SCNC: 19 MMOL/L — LOW (ref 22–31)
CREAT SERPL-MCNC: 1.68 MG/DL — HIGH (ref 0.5–1.3)
GLUCOSE SERPL-MCNC: 275 MG/DL — HIGH (ref 70–99)
HCT VFR BLD CALC: 43.8 % — SIGNIFICANT CHANGE UP (ref 39–50)
HGB BLD-MCNC: 13.7 G/DL — SIGNIFICANT CHANGE UP (ref 13–17)
INR BLD: 1.01 — SIGNIFICANT CHANGE UP (ref 0.88–1.17)
MCHC RBC-ENTMCNC: 26.4 PG — LOW (ref 27–34)
MCHC RBC-ENTMCNC: 31.3 % — LOW (ref 32–36)
MCV RBC AUTO: 84.4 FL — SIGNIFICANT CHANGE UP (ref 80–100)
NRBC # FLD: 0 — SIGNIFICANT CHANGE UP
PLATELET # BLD AUTO: 158 K/UL — SIGNIFICANT CHANGE UP (ref 150–400)
PMV BLD: 12.5 FL — SIGNIFICANT CHANGE UP (ref 7–13)
POTASSIUM SERPL-MCNC: 4.6 MMOL/L — SIGNIFICANT CHANGE UP (ref 3.5–5.3)
POTASSIUM SERPL-SCNC: 4.6 MMOL/L — SIGNIFICANT CHANGE UP (ref 3.5–5.3)
PROTHROM AB SERPL-ACNC: 11.2 SEC — SIGNIFICANT CHANGE UP (ref 9.8–13.1)
RBC # BLD: 5.19 M/UL — SIGNIFICANT CHANGE UP (ref 4.2–5.8)
RBC # FLD: 14.5 % — SIGNIFICANT CHANGE UP (ref 10.3–14.5)
RH IG SCN BLD-IMP: POSITIVE — SIGNIFICANT CHANGE UP
SODIUM SERPL-SCNC: 132 MMOL/L — LOW (ref 135–145)
TACROLIMUS SERPL-MCNC: 25.3 NG/ML — SIGNIFICANT CHANGE UP
WBC # BLD: 6.83 K/UL — SIGNIFICANT CHANGE UP (ref 3.8–10.5)
WBC # FLD AUTO: 6.83 K/UL — SIGNIFICANT CHANGE UP (ref 3.8–10.5)

## 2018-07-18 PROCEDURE — 77001 FLUOROGUIDE FOR VEIN DEVICE: CPT | Mod: 26,GC

## 2018-07-18 PROCEDURE — 36569 INSJ PICC 5 YR+ W/O IMAGING: CPT

## 2018-07-18 PROCEDURE — 99232 SBSQ HOSP IP/OBS MODERATE 35: CPT | Mod: GC

## 2018-07-18 PROCEDURE — 76937 US GUIDE VASCULAR ACCESS: CPT | Mod: 26

## 2018-07-18 PROCEDURE — 99239 HOSP IP/OBS DSCHRG MGMT >30: CPT

## 2018-07-18 RX ORDER — TACROLIMUS 5 MG/1
1 CAPSULE ORAL
Qty: 0 | Refills: 0 | COMMUNITY
Start: 2018-07-18

## 2018-07-18 RX ORDER — VANCOMYCIN HCL 1 G
1 VIAL (EA) INTRAVENOUS
Qty: 14 | Refills: 0 | OUTPATIENT
Start: 2018-07-18 | End: 2018-07-31

## 2018-07-18 RX ORDER — INSULIN LISPRO 100/ML
2 VIAL (ML) SUBCUTANEOUS
Qty: 0 | Refills: 0 | Status: DISCONTINUED | OUTPATIENT
Start: 2018-07-18 | End: 2018-07-18

## 2018-07-18 RX ORDER — INSULIN GLARGINE 100 [IU]/ML
42 INJECTION, SOLUTION SUBCUTANEOUS
Qty: 0 | Refills: 0 | COMMUNITY

## 2018-07-18 RX ORDER — HYDRALAZINE HCL 50 MG
3 TABLET ORAL
Qty: 270 | Refills: 0 | OUTPATIENT
Start: 2018-07-18 | End: 2018-08-16

## 2018-07-18 RX ORDER — CEFTRIAXONE 500 MG/1
2 INJECTION, POWDER, FOR SOLUTION INTRAMUSCULAR; INTRAVENOUS
Qty: 0 | Refills: 0 | COMMUNITY
Start: 2018-07-18 | End: 2018-07-26

## 2018-07-18 RX ORDER — VANCOMYCIN HCL 1 G
1 VIAL (EA) INTRAVENOUS
Qty: 0 | Refills: 0 | COMMUNITY
Start: 2018-07-18

## 2018-07-18 RX ORDER — TACROLIMUS 5 MG/1
1 CAPSULE ORAL
Qty: 0 | Refills: 0 | COMMUNITY

## 2018-07-18 RX ORDER — ACETAMINOPHEN 500 MG
2 TABLET ORAL
Qty: 0 | Refills: 0 | COMMUNITY

## 2018-07-18 RX ORDER — MYCOPHENOLIC ACID 180 MG/1
1 TABLET, DELAYED RELEASE ORAL
Qty: 0 | Refills: 0 | COMMUNITY
Start: 2018-07-18

## 2018-07-18 RX ORDER — INSULIN GLARGINE 100 [IU]/ML
4 INJECTION, SOLUTION SUBCUTANEOUS AT BEDTIME
Qty: 0 | Refills: 0 | Status: DISCONTINUED | OUTPATIENT
Start: 2018-07-18 | End: 2018-07-18

## 2018-07-18 RX ORDER — CEFTRIAXONE 500 MG/1
2 INJECTION, POWDER, FOR SOLUTION INTRAMUSCULAR; INTRAVENOUS
Qty: 16 | Refills: 0 | OUTPATIENT
Start: 2018-07-18 | End: 2018-07-25

## 2018-07-18 RX ORDER — AMLODIPINE BESYLATE 2.5 MG/1
1 TABLET ORAL
Qty: 30 | Refills: 0 | OUTPATIENT
Start: 2018-07-18 | End: 2018-08-16

## 2018-07-18 RX ORDER — ISOSORBIDE MONONITRATE 60 MG/1
1 TABLET, EXTENDED RELEASE ORAL
Qty: 0 | Refills: 0 | COMMUNITY
Start: 2018-07-18

## 2018-07-18 RX ORDER — LOSARTAN POTASSIUM 100 MG/1
1 TABLET, FILM COATED ORAL
Qty: 0 | Refills: 0 | COMMUNITY

## 2018-07-18 RX ORDER — AMLODIPINE BESYLATE 2.5 MG/1
1 TABLET ORAL
Qty: 0 | Refills: 0 | COMMUNITY
Start: 2018-07-18

## 2018-07-18 RX ORDER — INSULIN ASPART 100 [IU]/ML
14 INJECTION, SOLUTION SUBCUTANEOUS
Qty: 0 | Refills: 0 | COMMUNITY

## 2018-07-18 RX ORDER — ATOVAQUONE 750 MG/5ML
10 SUSPENSION ORAL
Qty: 0 | Refills: 0 | COMMUNITY
Start: 2018-07-18

## 2018-07-18 RX ORDER — INSULIN GLARGINE 100 [IU]/ML
4 INJECTION, SOLUTION SUBCUTANEOUS
Qty: 1 | Refills: 0 | OUTPATIENT
Start: 2018-07-18 | End: 2018-08-16

## 2018-07-18 RX ORDER — INSULIN ASPART 100 [IU]/ML
2 INJECTION, SOLUTION SUBCUTANEOUS
Qty: 10 | Refills: 0 | OUTPATIENT
Start: 2018-07-18 | End: 2018-08-16

## 2018-07-18 RX ORDER — CEFTRIAXONE 500 MG/1
1 INJECTION, POWDER, FOR SOLUTION INTRAMUSCULAR; INTRAVENOUS
Qty: 0 | Refills: 0 | COMMUNITY
Start: 2018-07-18 | End: 2018-07-26

## 2018-07-18 RX ORDER — CEFTRIAXONE 500 MG/1
0 INJECTION, POWDER, FOR SOLUTION INTRAMUSCULAR; INTRAVENOUS
Qty: 0 | Refills: 0 | COMMUNITY
Start: 2018-07-18

## 2018-07-18 RX ORDER — TACROLIMUS 5 MG/1
1 CAPSULE ORAL
Qty: 0 | Refills: 0 | Status: DISCONTINUED | OUTPATIENT
Start: 2018-07-18 | End: 2018-07-18

## 2018-07-18 RX ORDER — ROSUVASTATIN CALCIUM 5 MG/1
1 TABLET ORAL
Qty: 0 | Refills: 0 | COMMUNITY

## 2018-07-18 RX ORDER — ACYCLOVIR SODIUM 500 MG
1 VIAL (EA) INTRAVENOUS
Qty: 90 | Refills: 0 | OUTPATIENT
Start: 2018-07-18 | End: 2018-08-16

## 2018-07-18 RX ADMIN — ATOVAQUONE 1500 MILLIGRAM(S): 750 SUSPENSION ORAL at 13:04

## 2018-07-18 RX ADMIN — Medication 75 MILLIGRAM(S): at 13:05

## 2018-07-18 RX ADMIN — Medication 75 MILLIGRAM(S): at 05:52

## 2018-07-18 RX ADMIN — LEVETIRACETAM 500 MILLIGRAM(S): 250 TABLET, FILM COATED ORAL at 05:51

## 2018-07-18 RX ADMIN — MYCOPHENOLIC ACID 360 MILLIGRAM(S): 180 TABLET, DELAYED RELEASE ORAL at 05:52

## 2018-07-18 RX ADMIN — Medication 250 MILLIGRAM(S): at 14:22

## 2018-07-18 RX ADMIN — PANTOPRAZOLE SODIUM 40 MILLIGRAM(S): 20 TABLET, DELAYED RELEASE ORAL at 06:58

## 2018-07-18 RX ADMIN — TACROLIMUS 1.5 MILLIGRAM(S): 5 CAPSULE ORAL at 05:52

## 2018-07-18 RX ADMIN — HEPARIN SODIUM 5000 UNIT(S): 5000 INJECTION INTRAVENOUS; SUBCUTANEOUS at 05:52

## 2018-07-18 RX ADMIN — Medication 2 UNIT(S): at 13:07

## 2018-07-18 RX ADMIN — Medication 81 MILLIGRAM(S): at 13:04

## 2018-07-18 RX ADMIN — ISOSORBIDE MONONITRATE 60 MILLIGRAM(S): 60 TABLET, EXTENDED RELEASE ORAL at 13:04

## 2018-07-18 RX ADMIN — CLOPIDOGREL BISULFATE 75 MILLIGRAM(S): 75 TABLET, FILM COATED ORAL at 13:05

## 2018-07-18 RX ADMIN — AMLODIPINE BESYLATE 10 MILLIGRAM(S): 2.5 TABLET ORAL at 05:51

## 2018-07-18 RX ADMIN — CEFTRIAXONE 100 GRAM(S): 500 INJECTION, POWDER, FOR SOLUTION INTRAMUSCULAR; INTRAVENOUS at 14:21

## 2018-07-18 RX ADMIN — Medication 2: at 08:56

## 2018-07-18 RX ADMIN — Medication 3: at 13:03

## 2018-07-18 NOTE — PROGRESS NOTE ADULT - PROBLEM SELECTOR PROBLEM 1
Sepsis
RICCO (acute kidney injury)
Sepsis
RICCO (acute kidney injury)

## 2018-07-18 NOTE — PROGRESS NOTE ADULT - PROBLEM SELECTOR PLAN 2
Patient with DDRT in 2014 and is on tacrolimus 1.5 mg BID and mycophenolate 720 mg BID (as outpatient). Continue with reduced mycophenolate dose (360 mg BID). Pt. with tacrolimus trough level of 7.0 from 7/17/18. Recommend decreasing tacrolimus dose to 1mg PO BID considering increase in Scr today. Monitor 12 hour tacrolimus trough level Patient with DDRT in 2014 and is on tacrolimus 1.5 mg BID and mycophenolate 720 mg BID (as outpatient). Continue with reduced mycophenolate dose (360 mg BID). Pt. with tacrolimus trough level of 7.0 from 7/17/18. Recommend decreasing tacrolimus dose to 1mg PO BID. Monitor 12 hour tacrolimus trough level

## 2018-07-18 NOTE — DISCHARGE NOTE ADULT - CARE PLAN
Principal Discharge DX:	Bacteremia  Goal:	No infection  Assessment and plan of treatment:	When you came in you were being treated with daptomycin for MRSA bacteremia; you were switched to vancomycin. 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 Please continue taking vancomycin until August 1, 2018, and continue taking ceftriaxone until July 26, 2018. Please follow up with your primary care doctor within a week after being discharged.  Secondary Diagnosis:	Hypertension, unspecified type  Assessment and plan of treatment:	Your blood pressure was elevated when you came to the hospital. You mentioned that your blood pressure  Secondary Diagnosis:	Diabetes mellitus  Secondary Diagnosis:	Kidney transplanted Principal Discharge DX:	Bacteremia  Goal:	No infection  Assessment and plan of treatment:	When you came in you were being treated with daptomycin for MRSA bacteremia; you were switched to vancomycin. 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 Please continue taking vancomycin until August 1, 2018, and continue taking ceftriaxone until July 26, 2018. Please follow up with your primary care doctor within a week after being discharged.  Secondary Diagnosis:	Hypertension, unspecified type  Assessment and plan of treatment:	Your blood pressure was elevated when you came to the hospital. You mentioned that your blood pressure. Please stop taking losartan. Please take your blood pressure medications as prescribed, including Amlodipine 10mg, and Hydralazine 75 mg, and IMDUR It is important that your blood pressure is well controlled to prevent further damage to your kidneys  Secondary Diagnosis:	Diabetes mellitus  Assessment and plan of treatment:	Please note the changes in your Insulin regimen.  Secondary Diagnosis:	Kidney transplanted  Assessment and plan of treatment:	Continue taking Acyclovir, Atovaquone, Tacrolimus, and Mycophenolic Acid. Please note the change in your mycophenolic dosage.

## 2018-07-18 NOTE — DISCHARGE NOTE ADULT - MEDICATION SUMMARY - MEDICATIONS TO CHANGE
I will SWITCH the dose or number of times a day I take the medications listed below when I get home from the hospital:    isosorbide mononitrate 30 mg oral tablet, extended release  -- 1 tab(s) by mouth once a day    atovaquone 750 mg/5 mL oral suspension  -- 10 milliliter(s) by mouth once a day at night    NovoLOG FlexPen 100 units/mL injectable solution  -- 14 unit(s) injectable 3 times a day (before meals)

## 2018-07-18 NOTE — PROGRESS NOTE ADULT - PROBLEM SELECTOR PLAN 1
-Possible sources of infection include medial malleolus wound and picc line  -Started on vanco in the ED; continue. Start on cefepime; both renally dosed   -UA not concerning for a UTI  -RVP negative   -Osteomyelitis unlikely given negative X-ray   -F/U Blood and Urine cultures  -Podiatry consulted for foot wounds, recs as below  ---foot does not appear acutely infected at this time, no need for dressing at this time  -ID consulted per nephrology's recommendation, Cefepime switched to Zosyn (7/13- ) and continuing with Vanc (7/11- ); Patient to be switched to PO to complete treatment for GNR, and then picc line to be placed for vanc treatment until 8/1/18 -Possible sources of infection include medial malleolus wound and picc line  -RVP negative   -Osteomyelitis unlikely given negative X-ray   -Podiatry consulted for foot wounds, recs as below  ---foot does not appear acutely infected at this time, no need for dressing at this time  -ID consulted, vanc treatment until 8/1/18 and ceftriaxone

## 2018-07-18 NOTE — PROGRESS NOTE ADULT - PROBLEM SELECTOR PLAN 6
-hold home losartan  -continue isosorbide mononitrate 60mg; will increase to 90 if BP not well controlled  -c/w hydralazine 75mg, Amlodipine 10mg

## 2018-07-18 NOTE — DISCHARGE NOTE ADULT - MEDICATION SUMMARY - MEDICATIONS TO STOP TAKING
I will STOP taking the medications listed below when I get home from the hospital:    Basaglar KwikPen 100 units/mL subcutaneous solution  -- 42 unit(s) subcutaneous once a day (at bedtime)    losartan 25 mg oral tablet  -- 1 tab(s) by mouth once a day    tacrolimus 0.5 mg oral capsule  -- 1 cap(s) by mouth every 12 hours; total dose 1.5mg every 12 hours

## 2018-07-18 NOTE — PROGRESS NOTE ADULT - PROBLEM SELECTOR PROBLEM 2
RICCO (acute kidney injury)
Renal transplant, status post

## 2018-07-18 NOTE — DISCHARGE NOTE ADULT - MEDICATION SUMMARY - MEDICATIONS TO TAKE
I will START or STAY ON the medications listed below when I get home from the hospital:    Please check CBC, CMP and Vancomycin trough weekly while on antibiotics   -- 1  -- Indication: For Bacteremia    aspirin 81 mg oral delayed release tablet  -- 1 tab(s) by mouth once a day  -- Indication: For CAD (coronary artery disease)    isosorbide mononitrate 60 mg oral tablet, extended release  -- 1 tab(s) by mouth once a day  -- Indication: For HTN (hypertension)    levETIRAcetam 500 mg oral tablet  -- 1 tab(s) by mouth 2 times a day  -- Indication: For Need for prophylactic measure    Lantus 100 units/mL subcutaneous solution  -- 4 unit(s) subcutaneous once a day   -- Do not drink alcoholic beverages when taking this medication.  It is very important that you take or use this exactly as directed.  Do not skip doses or discontinue unless directed by your doctor.  Keep in refrigerator.  Do not freeze.    -- Indication: For Diabetes mellitus    NovoLOG FlexPen 100 units/mL injectable solution  -- 2 unit(s) injectable 3 times a day (before meals)   -- Indication: For Diabetes mellitus    Crestor 5 mg oral tablet  -- 1 tab(s) by mouth once a day (at bedtime)  -- Indication: For Hypercholesteremia    clopidogrel 75 mg oral tablet  -- 1 tab(s) by mouth once a day  -- Indication: For CAD (coronary artery disease)    chlorhexidine 0.12% mucous membrane liquid  -- swish and spit 15ml twice daily  -- Indication: For Need for prophylactic measure    acyclovir 400 mg oral tablet  -- 1 tab(s) by mouth every 8 hours  -- Indication: For Kidney transplanted    amLODIPine 10 mg oral tablet  -- 1 tab(s) by mouth once a day  -- Indication: For HTN (hypertension)    cefTRIAXone  -- 1 gram(s) intravenously once a day  -- Indication: For Bacteremia    vancomycin 1 g intravenous injection  -- 1 gram(s) intravenous every 24 hours  -- Indication: For Bacteremia    mycophenolic acid 360 mg oral delayed release tablet  -- 1 tab(s) by mouth 2 times a day  -- Indication: For Renal transplant, status post    tacrolimus 1 mg oral capsule  -- 1 cap(s) by mouth 2 times a day  -- Indication: For Renal transplant, status post    bisacodyl 5 mg oral delayed release tablet  -- 1 tab(s) by mouth once a day, As Needed  -- Indication: For Constipation    atovaquone 750 mg/5 mL oral suspension  -- 10 milliliter(s) by mouth once a day  -- Indication: For Need for prophylactic measure    Bepreve 1.5% ophthalmic solution  -- 1 drop(s) to each affected eye 2 times a day  -- Indication: For Other general symptom or sign    pantoprazole 40 mg oral delayed release tablet  -- 1 tab(s) by mouth once a day (before a meal)  -- Indication: For Need for prophylactic measure    hydrALAZINE 25 mg oral tablet  -- 3 tab(s) by mouth every 8 hours  -- Indication: For HTN (hypertension) I will START or STAY ON the medications listed below when I get home from the hospital:    Please check CBC, CMP and Vancomycin trough weekly while on antibiotics   -- 1  -- Indication: For Bacteremia    aspirin 81 mg oral delayed release tablet  -- 1 tab(s) by mouth once a day  -- Indication: For CAD (coronary artery disease)    isosorbide mononitrate 60 mg oral tablet, extended release  -- 1 tab(s) by mouth once a day  -- Indication: For HTN (hypertension)    levETIRAcetam 500 mg oral tablet  -- 1 tab(s) by mouth 2 times a day  -- Indication: For Need for prophylactic measure    Lantus 100 units/mL subcutaneous solution  -- 4 unit(s) subcutaneous once a day   -- Do not drink alcoholic beverages when taking this medication.  It is very important that you take or use this exactly as directed.  Do not skip doses or discontinue unless directed by your doctor.  Keep in refrigerator.  Do not freeze.    -- Indication: For Diabetes mellitus    NovoLOG FlexPen 100 units/mL injectable solution  -- 2 unit(s) injectable 3 times a day (before meals)   -- Indication: For Diabetes mellitus    Crestor 5 mg oral tablet  -- 1 tab(s) by mouth once a day (at bedtime)  -- Indication: For Hypercholesteremia    clopidogrel 75 mg oral tablet  -- 1 tab(s) by mouth once a day  -- Indication: For CAD (coronary artery disease)    chlorhexidine 0.12% mucous membrane liquid  -- swish and spit 15ml twice daily  -- Indication: For Need for prophylactic measure    acyclovir 400 mg oral tablet  -- 1 tab(s) by mouth every 8 hours  -- Indication: For Kidney transplanted    amLODIPine 10 mg oral tablet  -- 1 tab(s) by mouth once a day  -- Indication: For HTN (hypertension)    cefTRIAXone  -- 2 gram(s) intravenously once a day  -- Indication: For Bacteremia     vancomycin 1 g intravenous injection  -- 1 gram(s) intravenous every 24 hours  -- Indication: For Bacteremia    mycophenolic acid 360 mg oral delayed release tablet  -- 1 tab(s) by mouth 2 times a day  -- Indication: For Renal transplant, status post    tacrolimus 1 mg oral capsule  -- 1 cap(s) by mouth 2 times a day  -- Indication: For Renal transplant, status post    bisacodyl 5 mg oral delayed release tablet  -- 1 tab(s) by mouth once a day, As Needed  -- Indication: For Constipation    atovaquone 750 mg/5 mL oral suspension  -- 10 milliliter(s) by mouth once a day  -- Indication: For Need for prophylactic measure    Bepreve 1.5% ophthalmic solution  -- 1 drop(s) to each affected eye 2 times a day  -- Indication: For Other general symptom or sign    pantoprazole 40 mg oral delayed release tablet  -- 1 tab(s) by mouth once a day (before a meal)  -- Indication: For Need for prophylactic measure    hydrALAZINE 25 mg oral tablet  -- 3 tab(s) by mouth every 8 hours  -- Indication: For HTN (hypertension)

## 2018-07-18 NOTE — PROGRESS NOTE ADULT - SUBJECTIVE AND OBJECTIVE BOX
CHIEF COMPLAINT: Chills and Rigors    Interval Events: No acute event overnight. Patient is seen and examined at the bedside this morning.   -patient reports that he feels like he is now back at his baseline state of health  -patient with picc line to placed by IR    Review of Systems   Constitutional: -Chills, -rigors, -drowsiness, -generalized weakness. No fever, no  	CV: No chest pain, or palpitations. No orthopnea.   	Resp: No cough, or sputum production. No shortness of breath or dyspnea on exertion.   	GI: -nausea, -vomiting. No diarrhea or constipation. No abdominal pain.   	: No dysuria, no nocturia, +increased urinary frequency.  	Musculoskeletal: No back pain, no myalgias  	Skin: No rash or itchiness, left foot  	Neurological: No headache or dizziness. No syncope, no weakness, numbness.    OBJECTIVE:  Vital Signs Last 24 Hrs  T(C): 36.6 (18 Jul 2018 14:10), Max: 37.1 (17 Jul 2018 21:50)  T(F): 97.8 (18 Jul 2018 14:10), Max: 98.7 (17 Jul 2018 21:50)  HR: 65 (18 Jul 2018 14:10) (60 - 65)  BP: 153/58 (18 Jul 2018 14:10) (148/73 - 176/77)  BP(mean): --  RR: 18 (18 Jul 2018 14:10) (18 - 18)  SpO2: 100% (18 Jul 2018 14:10) (100% - 100%)    07-17 @ 07:01  -  07-18 @ 07:00  --------------------------------------------------------  IN: 0 mL / OUT: 1960 mL / NET: -1960 mL      CAPILLARY BLOOD GLUCOSE      POCT Blood Glucose.: 285 mg/dL (18 Jul 2018 13:01)      PHYSICAL EXAM:  General: Alert and cooperative. Not in acute stress. Well developed, well nourished.   	Head: Normocephalic,  	Eyes: Decreased vison, clear conjunctiva. EOMI, no ptosis.   	Neck: No lymphadenopathy, no masses, no thyromegaly.   	Respiratory: Bilateral lung clear to auscultation, no crackles, no wheezes, no rhonchi.   	Cardiovascular: S1/S2 auscultated, no murmur, or gallop. Rhythm is regular. There is no peripheral edema, cyanosis or pallor. Extremities are warm and well perfused.   	Abdomen: Soft, non-tender, nondistended, no guarding or rebound tenderness. Active bowel sounds in all 4 quadrants.   	Musculoskeletal: Adequately aligned spine. ROM intact spine and extremities. Healing wound on left medial malleolus and small area of necrosis on left toe  Neurological: AOAx4. CN2-12 grosslly intact. Decreased sensation in bilateral feet    LINES:    HOSPITAL MEDICATIONS:  aspirin enteric coated 81 milliGRAM(s) Oral daily  clopidogrel Tablet 75 milliGRAM(s) Oral daily  heparin  Injectable 5000 Unit(s) SubCutaneous every 12 hours    atovaquone Suspension 1500 milliGRAM(s) Oral daily  cefTRIAXone   IVPB 2 Gram(s) IV Intermittent every 24 hours  cefTRIAXone   IVPB      vancomycin  IVPB 1000 milliGRAM(s) IV Intermittent every 24 hours    amLODIPine   Tablet 10 milliGRAM(s) Oral daily  hydrALAZINE 75 milliGRAM(s) Oral every 8 hours  isosorbide   mononitrate ER Tablet (IMDUR) 60 milliGRAM(s) Oral daily    dextrose 40% Gel 15 Gram(s) Oral once PRN  dextrose 50% Injectable 12.5 Gram(s) IV Push once  dextrose 50% Injectable 25 Gram(s) IV Push once  dextrose 50% Injectable 25 Gram(s) IV Push once  glucagon  Injectable 1 milliGRAM(s) IntraMuscular once PRN  insulin glargine Injectable (LANTUS) 4 Unit(s) SubCutaneous at bedtime  insulin lispro (HumaLOG) corrective regimen sliding scale   SubCutaneous three times a day before meals  insulin lispro (HumaLOG) corrective regimen sliding scale   SubCutaneous at bedtime  insulin lispro Injectable (HumaLOG) 2 Unit(s) SubCutaneous three times a day before meals      levETIRAcetam 500 milliGRAM(s) Oral two times a day    pantoprazole    Tablet 40 milliGRAM(s) Oral before breakfast        dextrose 5%. 1000 milliLiter(s) IV Continuous <Continuous>  dextrose 5%. 1000 milliLiter(s) IV Continuous <Continuous>    mycophenolic acid  milliGRAM(s) Oral two times a day  tacrolimus 1 milliGRAM(s) Oral two times a day          LABS:                        13.7   6.83  )-----------( 158      ( 18 Jul 2018 05:43 )             43.8     Hgb Trend: 13.7<--, 12.7<--, 12.4<--, 13.1<--, 12.8<--  07-18    132<L>  |  98  |  21  ----------------------------<  275<H>  4.6   |  19<L>  |  1.68<H>    Ca    9.8      18 Jul 2018 05:43      Creatinine Trend: 1.68<--, 1.58<--, 1.77<--, 1.90<--, 1.74<--, 2.00<--  PT/INR - ( 18 Jul 2018 08:30 )   PT: 11.2 SEC;   INR: 1.01

## 2018-07-18 NOTE — DIETITIAN INITIAL EVALUATION ADULT. - NS AS NUTRI INTERV MEALS SNACK
General/healthful diet/1. Recommend to change diet to Consistent Carbohydrate (evening snack), No Concentrated Potassium. 2. Recommend GlucerEffdon Therapeutic Nutrition Shake 240mls 2x daily (440kcals, 20g protein). 3. Please Encourage po intake, assist with meals and menu selections, provide alternatives PRN. 4. Monitor weights, labs, BM's, skin integrity, p.o. intake.

## 2018-07-18 NOTE — PROGRESS NOTE ADULT - PROBLEM SELECTOR PLAN 3
-Continue Atovaquone, Mycophenolic acid and Tacrolimus  -F/U Tacrolimus levels  -Nephrology consulted, appreciate recs    -CTAP: No perinephric collection or hydronephrosis

## 2018-07-18 NOTE — DISCHARGE NOTE ADULT - REASON FOR ADMISSION
as per patient had chills when taking infusion. as per patient was feeling well x3 days. Chills and Rigors

## 2018-07-18 NOTE — DISCHARGE NOTE ADULT - OTHER SIGNIFICANT FINDINGS
< from: CT Abdomen and Pelvis No Cont (07.15.18 @ 16:05) >    EXAM:  CT ABDOMEN AND PELVIS        PROCEDURE DATE:  Jul 15 2018         INTERPRETATION:  CLINICAL INFORMATION: 53-year-old man status post renal   transplant on immunosuppressant therapy. Admitted with acute kidney   injury and bacteremia. Hematuria.    COMPARISON: Ultrasound 07/17/2015. CT scan 12/29/2010     PROCEDURE:   CT of the Abdomen and Pelvis was performed without intravenous contrast.   Intravenous contrast: None.  Oral contrast: None.  Sagittal and coronal reformats were performed.    FINDINGS:    LOWER CHEST: Pacemaker wire in heart. Mild cardiomegaly.    LIVER: Within normal limits.  BILE DUCTS: Normal caliber.  GALLBLADDER: Within normal limits.  SPLEEN: Within normal limits.  PANCREAS: Within normal limits.  ADRENALS: Withinnormal limits.  KIDNEYS/URETERS: Atrophic native kidneys.  Right lower quadrant renal transplant with perirenal inflammation. No   hydronephrosis.    BLADDER: Within normal limits.  REPRODUCTIVE ORGANS: Normal size prostate.    BOWEL: No bowel obstruction. Appendix normal.  PERITONEUM: No ascites.  VESSELS:  Within normal limits.  RETROPERITONEUM: No lymphadenopathy.    ABDOMINAL WALL: Fat-containing left inguinal hernia.  BONES: Within normal limits.    IMPRESSION:     Inflammatory changes rightlower quadrant renal transplant.  Mild cardiomegaly.  -------------------------------------------------------------------------------------  < from: US Kidney and Bladder (07.17.15 @ 14:02) >    EXAM:  US KIDNEYS AND BLADDER        PROCEDURE DATE:  Jul 17 2015       INTERPRETATION:  CLINICAL INFORMATION: History of renal transplant, mass   on native kidney.    Ultrasonography of the kidneys and bladder performed.     COMPARISON: Renal ultrasound 9/26/2014    The right kidney measures 10.8 cm in sagittal dimension.  Thin cortex. Increased cortical echogenicity.  There is no hydronephrosis or nephrolithiasis or solid renal mass.     The native left kidney is surgically absent.    Right lower quadrant transplant kidney measures 12.3 cm in sagittal   dimension. There is a 1.9 x 1.9 x 2.1 cm simple upper pole cyst.    The urinary bladder is unremarkable. Pre void volume 191 cc. Post void   volume 110 cc.    IMPRESSION: Left nephrectomy.No solid mass seen on the native right   kidney.    Post void bladder residual of 1 10 cc.  -------------------------------------------------------------------------------------------------

## 2018-07-18 NOTE — DISCHARGE NOTE ADULT - CONDITIONS AT DISCHARGE
This Patient is stable, discharged, alert , self sufficient with his care; he is out of bed with his Cane at bedside.  He has the Left Arm AV Fistula with + Bruit and Thrill , so left Arm Precautions are taken; vital signs are stable; Left Foot has a Scab intact; he does have the Right Upper Arm PICC Line for continued IV Antibiotic at Home until 8/1/2018.  This Line was placed on 7/17/2018 is clean dry , and intact , no c/o Pain at site.

## 2018-07-18 NOTE — DISCHARGE NOTE ADULT - CARE PROVIDER_API CALL
Leobardo Fernandez), Infectious Disease; Internal Medicine  [address] [place] [address]  Phone: [phone]  Fax: [phone]

## 2018-07-18 NOTE — DISCHARGE NOTE ADULT - CARE PROVIDERS DIRECT ADDRESSES
[email or web address]

## 2018-07-18 NOTE — DISCHARGE NOTE ADULT - HOSPITAL COURSE
History  Patient is a 53 year old male with a PMH of CAD s/p stents in 2010/2011 and s/p pacemaker, MI, ESRD s/p renal transplant in 2014, HTN, HLD, CAD, DM, presenting with a three day history of chills, rigors, and night sweats. About three weeks ago patient was hospitalized at [facility] in [place] after experiencing leg weakness, and discharged home on Daptomycin 500mg IV daily via Picc line for treatment of MRSA  bacteremia. Source of infection thought to be a wound on his medial left foot received while mowing his lawn. He was tolerating his IV medication well, until Saturday when he received a new shipment from his pharmacy and upon his first infusion noticed that he felt sleepy/drowsy, as if he were drunk. He had no issues with the medication on Sunday, but noticed that about 20 minutes after receiving his dose on Monday he started experiencing extreme rigors, chills, which lasted for about 4 hours. He had a similar experience on Tuesday again, this time lasting for about 2hrs. He stated that the dose he received might be higher than that which was prescribed, but he is unsure He also reported noticing a foul smell from his picc line (now removed). Denied headache, lightheadedness, chest pain, shortness of breath, dysuria, back pain, abdominal pain , diarrhea or constipation. He endorsed some increased urinary frequency. Also endorsed decreased PO intake.    ED Course:  Patient was afebrile in the ED, Tmax 98.2, HR 60, SERGEY 133/77, RR 18, O2 sat 98% on RA UA, CXR, left ankle x-ray were obtained. No leukocytosis. Hyponatremic. Started on NS and Vancomycin.    Hospital Course  Blood cultures initially showed gram negative rods and patient was started on vancomycin and cefepime, then cefepime was switched to zosyn as per recommendation of the consulted infectious disease specialists. The culture later showed Kluyvera Ascorbata and patient was continued on vancomycin to continue treatment for his previous gram negative bacteremia, and zosyn was switched to ceftriaxone. On presentation the patient also had dark urine, nephrology was consulted and imaging of the kidney was done. His blood pressure was elevated on presentation and his hypertension regimen was switched to a combination of hydralazine, imdur and amlodipine. Patient's original picc line was removed in the ED, and a new one placed on the day of discharge 7/18/2018. At the time of discharge he was afebrile and hemodynamically stable.

## 2018-07-18 NOTE — PROGRESS NOTE ADULT - ATTENDING COMMENTS
53 year old with ESRD s/p renal transplant, immunosuppressed with ppm  recent staph bacteremia    Now with Gram negative bacteremia    Continue zosyn as empiric gram negative coverage    Continue vancomycin given recent staph bacteremia    Check CT a/p
Overall patient is feeling better. Denies any localized symptoms. Denies SOB. Denies peripheral edema.    Discussed we are awaiting further results to exclude sources of infection. Discussed that line infection remains a possibility. Patient requesting to be given a diagnosis and plan once available.
Pt seen and examined in AM. clinically stable.   c/w vanco for h/o MRSA bacteremia.   switched to ceftriaxone for GNR bacteremia   Plan for PICC today then d/c home. Time 40 min  d/w his PCP over the phone
BP continues to be markedly elevated - increasing amlodipine, hydralazine. RICCO improving slowly. Awaiting S/S of blood culture. Repeat blood culture in process. Appreciate renal input given h/o renal transplant - obtain US of transplanted kidney.
Pt seen and examined in AM. clinically stable.   c/w vanco for h/o MRSA bacteremia.   switched to ceftriaxone for GNR bacteremia   Pt needs PICC
Pt seen and examined in AM. clinically stable.   c/w vanco for h/o MRSA bacteremia.   switched to ceftriaxone for GNR bacteremia   Plan for PICC  d/w his PCP over the phone

## 2018-07-18 NOTE — DIETITIAN INITIAL EVALUATION ADULT. - ENERGY NEEDS
Weight: 176.3# (80kg) (7/11)   Height: 67 inches  BMI: 27.6kg/m^2  IBW: 148# (67.3kg) +/-10%  No edema noted.

## 2018-07-18 NOTE — DIETITIAN INITIAL EVALUATION ADULT. - OTHER INFO
Initial Dietitian Evaluation 2/2 to extended length of stay. Per chart review patient with medical history of  CAD s/p stents in 2010/2011 and s/p pacemaker, MI, ESRD s/p renal transplant in 2014, HTN, HLD, CAD, DM, presenting with a three day history of chills, rigors, and night sweats admitted with bacteremia and RICCO. Patient reports poor appetite and PO intake since 7/07 (~11 days). NKFA. Patient tries to limit sugar intake. REports checking Finger sticks at home with readings normally around 135-138mg/dL. HbA1c 6.8% (7/12). Patient reports weight loss however was not able to provide timeframe over which weight changed. Encouraged PO intake. Patient amenable to Glucerna Shake supplementation.

## 2018-07-18 NOTE — PROGRESS NOTE ADULT - SUBJECTIVE AND OBJECTIVE BOX
[facility] DIVISION OF KIDNEY DISEASES AND HYPERTENSION -- FOLLOW UP NOTE  --------------------------------------------------------------------------------  HPI: 53 year old man with a history of DDRT (2/2014 on tacrolimus 1.5 mg BID and mycophenolate 360 mg BID), DM2, HTN, CAD s/p PCI, left nephrectomy, subdural hematoma s/p craniotomy (prior to 2014), seizure disorder, presented to the hospital with 3 days of chills, poor PO intake, and decreased vision. Approximately 3.5 weeks ago he was admitted to the hospital in [place] for bacteremia associated with a wound on the medial aspect of his left foot. He was discharged with IV Daptomycin and has been on it for two weeks at home with the assistance of a visiting nurse. This past week his PICC line was found to be occluded and the visiting nurse removed it. On review of previous labs, Scr ranges from 1.3-1.5.  Pt. says that he saw his transplant nephrologist Dr. Miller on 7/2/18 and Scr was 1.3 on labs done during office visit. However Pt. found to have elevated Scr of 2.2 on labs in ER.      Patient was seen and examined at bedside today. Patient feels well overall and denies shortness of breath, chest pain, nausea, vomiting, or diarrhea. Anticipating discharge today.    PAST HISTORY  --------------------------------------------------------------------------------  No significant changes to PMH, PSH, FHx, SHx, unless otherwise noted    ALLERGIES & MEDICATIONS  --------------------------------------------------------------------------------  Allergies    dapsone (Unknown)    Intolerances    Standing Inpatient Medications  amLODIPine   Tablet 10 milliGRAM(s) Oral daily  aspirin enteric coated 81 milliGRAM(s) Oral daily  atovaquone Suspension 1500 milliGRAM(s) Oral daily  cefTRIAXone   IVPB 2 Gram(s) IV Intermittent every 24 hours  cefTRIAXone   IVPB      clopidogrel Tablet 75 milliGRAM(s) Oral daily  dextrose 5%. 1000 milliLiter(s) IV Continuous <Continuous>  dextrose 5%. 1000 milliLiter(s) IV Continuous <Continuous>  dextrose 50% Injectable 12.5 Gram(s) IV Push once  dextrose 50% Injectable 25 Gram(s) IV Push once  dextrose 50% Injectable 25 Gram(s) IV Push once  heparin  Injectable 5000 Unit(s) SubCutaneous every 12 hours  hydrALAZINE 75 milliGRAM(s) Oral every 8 hours  insulin glargine Injectable (LANTUS) 4 Unit(s) SubCutaneous at bedtime  insulin lispro (HumaLOG) corrective regimen sliding scale   SubCutaneous three times a day before meals  insulin lispro (HumaLOG) corrective regimen sliding scale   SubCutaneous at bedtime  insulin lispro Injectable (HumaLOG) 2 Unit(s) SubCutaneous three times a day before meals  isosorbide   mononitrate ER Tablet (IMDUR) 60 milliGRAM(s) Oral daily  levETIRAcetam 500 milliGRAM(s) Oral two times a day  mycophenolic acid  milliGRAM(s) Oral two times a day  pantoprazole    Tablet 40 milliGRAM(s) Oral before breakfast  tacrolimus 1 milliGRAM(s) Oral two times a day  vancomycin  IVPB 1000 milliGRAM(s) IV Intermittent every 24 hours    PRN Inpatient Medications  dextrose 40% Gel 15 Gram(s) Oral once PRN  glucagon  Injectable 1 milliGRAM(s) IntraMuscular once PRN    REVIEW OF SYSTEMS  --------------------------------------------------------------------------------  Gen: No fever  Respiratory: No dyspnea  CV: No chest pain  GI: No abdominal pain  MSK: No LE edema  Neuro: No dizziness  Heme: No bleeding    All other systems were reviewed and are negative, except as noted.    VITALS/PHYSICAL EXAM  --------------------------------------------------------------------------------  T(C): 36.8 (07-18-18 @ 05:47), Max: 37.2 (07-17-18 @ 13:53)  HR: 60 (07-18-18 @ 05:47) (60 - 79)  BP: 176/77 (07-18-18 @ 05:47) (148/73 - 176/77)  RR: 18 (07-18-18 @ 05:47) (18 - 18)  SpO2: 100% (07-18-18 @ 05:47) (99% - 100%)  Wt(kg): --    07-17-18 @ 07:01  -  07-18-18 @ 07:00  --------------------------------------------------------  IN: 0 mL / OUT: 1960 mL / NET: -1960 mL    Physical Exam:  	Gen: resting, NAD  	HEENT: No JVD  	Pulm: CTA B/L  	CV: S1S2+  	Abd: Soft, +BS   	Ext:  Healing wound on the medial aspect of the left foot. No LE edema B/L  	Neuro: Awake  	Skin: Warm and dry  	Vascular access: HUSEYIN PARKINSON: +kong    LABS/STUDIES  --------------------------------------------------------------------------------              13.7   6.83  >-----------<  158      [07-18-18 @ 05:43]              43.8     132  |  98  |  21  ----------------------------<  275      [07-18-18 @ 05:43]  4.6   |  19  |  1.68        Ca     9.8     [07-18-18 @ 05:43]      PT/INR: PT 11.2 , INR 1.01       [07-18-18 @ 08:30]    Creatinine Trend:  SCr 1.68 [07-18 @ 05:43]  SCr 1.58 [07-17 @ 05:30]  SCr 1.77 [07-16 @ 05:20]  SCr 1.90 [07-15 @ 05:35]  SCr 1.74 [07-14 @ 05:55] [facility] DIVISION OF KIDNEY DISEASES AND HYPERTENSION -- FOLLOW UP NOTE  --------------------------------------------------------------------------------  HPI: 53 year old man with a history of DDRT (2/2014 on tacrolimus 1.5 mg BID and mycophenolate 360 mg BID), DM2, HTN, CAD s/p PCI, left nephrectomy, subdural hematoma s/p craniotomy (prior to 2014), seizure disorder, presented to the hospital with 3 days of chills, poor PO intake, and decreased vision. Approximately 3.5 weeks ago he was admitted to the hospital in [place] for bacteremia associated with a wound on the medial aspect of his left foot. He was discharged with IV Daptomycin and has been on it for two weeks at home with the assistance of a visiting nurse. This past week his PICC line was found to be occluded and the visiting nurse removed it. On review of previous labs, Scr ranges from 1.3-1.5.  Pt. says that he saw his transplant nephrologist Dr. Miller on 7/2/18 and Scr was 1.3 on labs done during office visit. However Pt. found to have elevated Scr of 2.2 on labs in ER.      Patient was seen and examined at bedside today. Patient feels well overall and denies shortness of breath, chest pain, nausea, vomiting, or diarrhea. Anticipating discharge today.    PAST HISTORY  --------------------------------------------------------------------------------  No significant changes to PMH, PSH, FHx, SHx, unless otherwise noted    ALLERGIES & MEDICATIONS  --------------------------------------------------------------------------------  Allergies    dapsone (Unknown)    Intolerances    Standing Inpatient Medications  amLODIPine   Tablet 10 milliGRAM(s) Oral daily  aspirin enteric coated 81 milliGRAM(s) Oral daily  atovaquone Suspension 1500 milliGRAM(s) Oral daily  cefTRIAXone   IVPB 2 Gram(s) IV Intermittent every 24 hours  cefTRIAXone   IVPB      clopidogrel Tablet 75 milliGRAM(s) Oral daily  dextrose 5%. 1000 milliLiter(s) IV Continuous <Continuous>  dextrose 5%. 1000 milliLiter(s) IV Continuous <Continuous>  dextrose 50% Injectable 12.5 Gram(s) IV Push once  dextrose 50% Injectable 25 Gram(s) IV Push once  dextrose 50% Injectable 25 Gram(s) IV Push once  heparin  Injectable 5000 Unit(s) SubCutaneous every 12 hours  hydrALAZINE 75 milliGRAM(s) Oral every 8 hours  insulin glargine Injectable (LANTUS) 4 Unit(s) SubCutaneous at bedtime  insulin lispro (HumaLOG) corrective regimen sliding scale   SubCutaneous three times a day before meals  insulin lispro (HumaLOG) corrective regimen sliding scale   SubCutaneous at bedtime  insulin lispro Injectable (HumaLOG) 2 Unit(s) SubCutaneous three times a day before meals  isosorbide   mononitrate ER Tablet (IMDUR) 60 milliGRAM(s) Oral daily  levETIRAcetam 500 milliGRAM(s) Oral two times a day  mycophenolic acid  milliGRAM(s) Oral two times a day  pantoprazole    Tablet 40 milliGRAM(s) Oral before breakfast  tacrolimus 1 milliGRAM(s) Oral two times a day  vancomycin  IVPB 1000 milliGRAM(s) IV Intermittent every 24 hours    PRN Inpatient Medications  dextrose 40% Gel 15 Gram(s) Oral once PRN  glucagon  Injectable 1 milliGRAM(s) IntraMuscular once PRN    REVIEW OF SYSTEMS  --------------------------------------------------------------------------------  Gen: No fever  Respiratory: No dyspnea  CV: No chest pain  GI: No abdominal pain  MSK: No LE edema  Neuro: No dizziness  Heme: No bleeding    All other systems were reviewed and are negative, except as noted.    VITALS/PHYSICAL EXAM  --------------------------------------------------------------------------------  T(C): 36.8 (07-18-18 @ 05:47), Max: 37.2 (07-17-18 @ 13:53)  HR: 60 (07-18-18 @ 05:47) (60 - 79)  BP: 176/77 (07-18-18 @ 05:47) (148/73 - 176/77)  RR: 18 (07-18-18 @ 05:47) (18 - 18)  SpO2: 100% (07-18-18 @ 05:47) (99% - 100%)  Wt(kg): --    07-17-18 @ 07:01  -  07-18-18 @ 07:00  --------------------------------------------------------  IN: 0 mL / OUT: 1960 mL / NET: -1960 mL    Physical Exam:  	Gen: resting, NAD  	HEENT: No JVD  	Pulm: CTA B/L  	CV: S1S2+  	Abd: Soft, +BS   	Ext:  Healing wound on the medial aspect of the left foot. No LE edema B/L  	Neuro: Awake  	Skin: Warm and dry  	Vascular access: HUSEYIN PARKINSON: +kong    LABS/STUDIES  --------------------------------------------------------------------------------              13.7   6.83  >-----------<  158      [07-18-18 @ 05:43]              43.8     132  |  98  |  21  ----------------------------<  275      [07-18-18 @ 05:43]  4.6   |  19  |  1.68        Ca     9.8     [07-18-18 @ 05:43]    Creatinine Trend:  SCr 1.68 [07-18 @ 05:43]  SCr 1.58 [07-17 @ 05:30]  SCr 1.77 [07-16 @ 05:20]  SCr 1.90 [07-15 @ 05:35]  SCr 1.74 [07-14 @ 05:55]

## 2018-07-18 NOTE — PROGRESS NOTE ADULT - PROBLEM SELECTOR PLAN 1
Pt. with RICCO in setting of bacteremia and decreased oral intake. Scr was 1.3 on labs done on 7/2/18, increased to 2.2 on 7/11/18 and is 1.68 today. Monitor BMP daily. Avoid any potential nephrotoxins

## 2018-07-18 NOTE — PROGRESS NOTE ADULT - PROBLEM SELECTOR PLAN 7
-DVT: Heparin SubQ  -GI: Protonix  -Dispo; Home today  with picc line for vanco and ceftriaxone    Cora Meeks PGY1  Internal Medicine [facility]  pager [phone]

## 2018-07-18 NOTE — DISCHARGE NOTE ADULT - PLAN OF CARE
No infection When you came in you were being treated with daptomycin for MRSA bacteremia; you were switched to vancomycin. 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 Please continue taking vancomycin until August 1, 2018, and continue taking ceftriaxone until July 26, 2018. Please follow up with your primary care doctor within a week after being discharged. Your blood pressure was elevated when you came to the hospital. You mentioned that your blood pressure Your blood pressure was elevated when you came to the hospital. You mentioned that your blood pressure. Please stop taking losartan. Please take your blood pressure medications as prescribed, including Amlodipine 10mg, and Hydralazine 75 mg, and IMDUR It is important that your blood pressure is well controlled to prevent further damage to your kidneys Please note the changes in your Insulin regimen. Continue taking Acyclovir, Atovaquone, Tacrolimus, and Mycophenolic Acid. Please note the change in your mycophenolic dosage.

## 2018-07-18 NOTE — DISCHARGE NOTE ADULT - PATIENT PORTAL LINK FT
You can access the [facility] Patient Portal, offered by [facility], by registering with the following website: [email or web address]

## 2018-07-18 NOTE — PROGRESS NOTE ADULT - PROBLEM SELECTOR PROBLEM 4
Diabetes mellitus

## 2018-07-18 NOTE — DISCHARGE NOTE ADULT - HOME CARE AGENCY
[facility] [phone] Nurse to visit to set up home infusion for the next scheduled dose of antibiotics after discharge

## 2018-07-18 NOTE — PROGRESS NOTE ADULT - PROBLEM SELECTOR PROBLEM 3
HTN (hypertension)
Renal transplant, status post
HTN (hypertension)
HTN (hypertension)
Renal transplant, status post
HTN (hypertension)

## 2018-07-18 NOTE — PROGRESS NOTE ADULT - PROVIDER SPECIALTY LIST ADULT
Infectious Disease
Internal Medicine
Nephrology
Podiatry
Podiatry
Nephrology
Internal Medicine

## 2018-07-19 ENCOUNTER — APPOINTMENT (OUTPATIENT)
Dept: INFECTIOUS DISEASE | Facility: CLINIC | Age: 53
End: 2018-07-19

## 2018-08-01 PROCEDURE — G9005: CPT

## 2018-08-02 NOTE — PROGRESS NOTE ADULT - SUBJECTIVE AND OBJECTIVE BOX
[facility] DIVISION OF KIDNEY DISEASES AND HYPERTENSION -- INITIAL CONSULT NOTE  --------------------------------------------------------------------------------  HPI:        PAST HISTORY  --------------------------------------------------------------------------------  PAST MEDICAL & SURGICAL HISTORY:  Seizure: 1 episode   Hypercholesteremia  Subdural hematoma:  treated with craniotomy/ shunt  Diabetes mellitus  HTN (hypertension)  MI (myocardial infarction)  CAD (coronary artery disease)  Kidney transplanted:  donor   Pacemaker: [employer]  History of ventricular shunt:   History of craniotomy:   H/O unilateral nephrectomy  S/P primary angioplasty with coronary stent: 4 total stents (first stent placed , one year later other 3 stents placed)    FAMILY HISTORY:  Family history of hypertension  Family history of hyperlipidemia  Family history of diabetes mellitus (Sibling)  Family history of myocardial infarction    PAST SOCIAL HISTORY:    ALLERGIES & MEDICATIONS  --------------------------------------------------------------------------------  Allergies    dapsone (Unknown)    Intolerances      Standing Inpatient Medications  amLODIPine   Tablet 10 milliGRAM(s) Oral daily  aspirin enteric coated 81 milliGRAM(s) Oral daily  atovaquone Suspension 1500 milliGRAM(s) Oral daily  cefTRIAXone   IVPB 2 Gram(s) IV Intermittent every 24 hours  cefTRIAXone   IVPB      clopidogrel Tablet 75 milliGRAM(s) Oral daily  dextrose 5%. 1000 milliLiter(s) IV Continuous <Continuous>  dextrose 5%. 1000 milliLiter(s) IV Continuous <Continuous>  dextrose 50% Injectable 12.5 Gram(s) IV Push once  dextrose 50% Injectable 25 Gram(s) IV Push once  dextrose 50% Injectable 25 Gram(s) IV Push once  heparin  Injectable 5000 Unit(s) SubCutaneous every 12 hours  hydrALAZINE 75 milliGRAM(s) Oral every 8 hours  insulin lispro (HumaLOG) corrective regimen sliding scale   SubCutaneous three times a day before meals  insulin lispro (HumaLOG) corrective regimen sliding scale   SubCutaneous at bedtime  insulin lispro Injectable (HumaLOG) 1 Unit(s) SubCutaneous three times a day before meals  isosorbide   mononitrate ER Tablet (IMDUR) 60 milliGRAM(s) Oral daily  levETIRAcetam 500 milliGRAM(s) Oral two times a day  mycophenolic acid  milliGRAM(s) Oral two times a day  pantoprazole    Tablet 40 milliGRAM(s) Oral before breakfast  tacrolimus 1.5 milliGRAM(s) Oral every 12 hours  vancomycin  IVPB 1000 milliGRAM(s) IV Intermittent every 24 hours    PRN Inpatient Medications  dextrose 40% Gel 15 Gram(s) Oral once PRN  glucagon  Injectable 1 milliGRAM(s) IntraMuscular once PRN      REVIEW OF SYSTEMS  --------------------------------------------------------------------------------  Gen: No weight changes, fatigue, fevers/chills, weakness  Skin: No rashes  Respiratory: No dyspnea, cough, wheezing, hemoptysis  CV: No chest pain, PND, orthopnea  GI: No abdominal pain, diarrhea, constipation, nausea, vomiting, melena, hematochezia  : No increased frequency, dysuria, hematuria, nocturia  MSK: No joint pain/swelling; no back pain; no edema  Neuro: No dizziness/lightheadedness, weakness  Heme: No easy bruising or bleeding      All other systems were reviewed and are negative, except as noted.    VITALS/PHYSICAL EXAM  --------------------------------------------------------------------------------  T(C): 36.8 (18 @ 05:34), Max: 36.9 (18 @ 14:30)  HR: 59 (18 @ 05:34) (59 - 60)  BP: 155/71 (18 @ 05:34) (147/66 - 155/71)  RR: 18 (18 @ 05:34) (18 - 18)  SpO2: 98% (18 @ 05:34) (98% - 99%)  Wt(kg): --        18 @ 07:01  -  18 @ 07:00  --------------------------------------------------------  IN: 600 mL / OUT: 750 mL / NET: -150 mL    18 @ 07:01  -  18 @ 09:58  --------------------------------------------------------  IN: 0 mL / OUT: 300 mL / NET: -300 mL      Physical Exam:  	Gen: NAD, well-appearing  	HEENT: PERRL, supple neck, clear oropharynx  	Pulm: CTA B/L  	CV: RRR, S1S2; no rub  	Back: No spinal or CVA tenderness; no sacral edema  	Abd: +BS, soft, nontender/nondistended  	: No suprapubic tenderness  	LE: Warm, FROM, no clubbing, intact strength; no edema  	Skin: Warm, without rashes  	Vascular access:    LABS/STUDIES  --------------------------------------------------------------------------------              12.7   7.30  >-----------<  139      [18 @ 05:30]              40.0     133  |  100  |  17  ----------------------------<  230      [18 05:30]  4.4   |  19  |  1.58        Ca     9.3     [18 05:30]    Creatinine Trend:  SCr 1.58 [ 05:30]  SCr 1.77 [ 05:20]  SCr 1.90 [07-15 @ 05:35]  SCr 1.74 [ 05:55]  SCr 2.00 [ 05:30]    C3 Complement 139.3      [18 @ 05:20]  C4 Complement 32.6      [18 @ 05:20] [facility] DIVISION OF KIDNEY DISEASES AND HYPERTENSION -- INITIAL CONSULT NOTE  --------------------------------------------------------------------------------  HPI: 53 year old man with a history of DDRT (2014 on tacrolimus 1.5 mg BID and mycophenolate 360 mg BID), DM2, HTN, CAD s/p PCI, left nephrectomy, subdural hematoma s/p craniotomy (prior to ), seizure disorder, presented to the hospital with 3 days of chills, poor PO intake, and decreased vision. Approximately 3.5 weeks ago he was admitted to the hospital in [place] for bacteremia associated with a wound on the medial aspect of his left foot. He was discharged with IV Daptomycin and has been on it for two weeks at home with the assistance of a visiting nurse. This past week his PICC line was found to be occluded and the visiting nurse removed it. On review of previous labs, Scr ranges from 1.3-1.5.  Pt. says that he saw his transplant nephrologist Dr. Miller on 18 and Scr was 1.3 on labs done during office visit. However Pt. found to have elevated Scr of 2.2 on labs in ER.      Patient was seen and examined at bedside today. Upset about his current medical condition. Patient feels well overall and denies shortness of breath, chest pain, nausea, vomiting, or diarrhea.    PAST HISTORY  --------------------------------------------------------------------------------  PAST MEDICAL & SURGICAL HISTORY:  Seizure: 1 episode   Hypercholesteremia  Subdural hematoma:  treated with craniotomy/ shunt  Diabetes mellitus  HTN (hypertension)  MI (myocardial infarction)  CAD (coronary artery disease)  Kidney transplanted:  donor   Pacemaker: [employer]  History of ventricular shunt:   History of craniotomy:   H/O unilateral nephrectomy  S/P primary angioplasty with coronary stent: 4 total stents (first stent placed , one year later other 3 stents placed)    FAMILY HISTORY:  Family history of hypertension  Family history of hyperlipidemia  Family history of diabetes mellitus (Sibling)  Family history of myocardial infarction    PAST SOCIAL HISTORY:    ALLERGIES & MEDICATIONS  --------------------------------------------------------------------------------  Allergies    dapsone (Unknown)    Intolerances    Standing Inpatient Medications  amLODIPine   Tablet 10 milliGRAM(s) Oral daily  aspirin enteric coated 81 milliGRAM(s) Oral daily  atovaquone Suspension 1500 milliGRAM(s) Oral daily  cefTRIAXone   IVPB 2 Gram(s) IV Intermittent every 24 hours  cefTRIAXone   IVPB      clopidogrel Tablet 75 milliGRAM(s) Oral daily  dextrose 5%. 1000 milliLiter(s) IV Continuous <Continuous>  dextrose 5%. 1000 milliLiter(s) IV Continuous <Continuous>  dextrose 50% Injectable 12.5 Gram(s) IV Push once  dextrose 50% Injectable 25 Gram(s) IV Push once  dextrose 50% Injectable 25 Gram(s) IV Push once  heparin  Injectable 5000 Unit(s) SubCutaneous every 12 hours  hydrALAZINE 75 milliGRAM(s) Oral every 8 hours  insulin lispro (HumaLOG) corrective regimen sliding scale   SubCutaneous three times a day before meals  insulin lispro (HumaLOG) corrective regimen sliding scale   SubCutaneous at bedtime  insulin lispro Injectable (HumaLOG) 1 Unit(s) SubCutaneous three times a day before meals  isosorbide   mononitrate ER Tablet (IMDUR) 60 milliGRAM(s) Oral daily  levETIRAcetam 500 milliGRAM(s) Oral two times a day  mycophenolic acid  milliGRAM(s) Oral two times a day  pantoprazole    Tablet 40 milliGRAM(s) Oral before breakfast  tacrolimus 1.5 milliGRAM(s) Oral every 12 hours  vancomycin  IVPB 1000 milliGRAM(s) IV Intermittent every 24 hours    PRN Inpatient Medications  dextrose 40% Gel 15 Gram(s) Oral once PRN  glucagon  Injectable 1 milliGRAM(s) IntraMuscular once PRN      REVIEW OF SYSTEMS  --------------------------------------------------------------------------------  Gen: No fever  Respiratory: No dyspnea  CV: No chest pain  GI: No abdominal pain  MSK: No LE edema  Neuro: No dizziness  Heme: No bleeding    All other systems were reviewed and are negative, except as noted.    VITALS/PHYSICAL EXAM  --------------------------------------------------------------------------------  T(C): 36.8 (18 @ 05:34), Max: 36.9 (18 @ 14:30)  HR: 59 (18 05:34) (59 - 60)  BP: 155/71 (18 @ 05:34) (147/66 - 155/71)  RR: 18 (18 @ 05:34) (18 - 18)  SpO2: 98% (18 @ 05:34) (98% - 99%)  Wt(kg): --    18 @ 07:01  -  18 @ 07:00  --------------------------------------------------------  IN: 600 mL / OUT: 750 mL / NET: -150 mL    18 @ 07:01  -  18 @ 09:58  --------------------------------------------------------  IN: 0 mL / OUT: 300 mL / NET: -300 mL    Physical Exam:  	Gen: resting, NAD  	HEENT: No JVD  	Pulm: CTA B/L  	CV: S1S2+  	Abd: Soft, +BS   	Ext:  Healing wound on the medial aspect of the left foot. No LE edema B/L  	Neuro: Awake  	Skin: Warm and dry  	Vascular access: LUE AVF: +thrill    LABS/STUDIES  --------------------------------------------------------------------------------              12.7   7.30  >-----------<  139      [18 @ 05:30]              40.0     133  |  100  |  17  ----------------------------<  230      [18 @ 05:30]  4.4   |  19  |  1.58        Ca     9.3     [18 05:30]    Creatinine Trend:  SCr 1.58 [ 05:30]  SCr 1.77 [ 05:20]  SCr 1.90 [07-15 @ 05:35]  SCr 1.74 [ 05:55]  SCr 2.00 [ 05:30]    C3 Complement 139.3      [18 05:20]  C4 Complement 32.6      [18 05:20] [facility] DIVISION OF KIDNEY DISEASES AND HYPERTENSION -- INITIAL CONSULT NOTE  --------------------------------------------------------------------------------  HPI: 53 year old man with a history of DDRT (2014 on tacrolimus 1.5 mg BID and mycophenolate 360 mg BID), DM2, HTN, CAD s/p PCI, left nephrectomy, subdural hematoma s/p craniotomy (prior to ), seizure disorder, presented to the hospital with 3 days of chills, poor PO intake, and decreased vision. Approximately 3.5 weeks ago he was admitted to the hospital in [place] for bacteremia associated with a wound on the medial aspect of his left foot. He was discharged with IV Daptomycin and has been on it for two weeks at home with the assistance of a visiting nurse. This past week his PICC line was found to be occluded and the visiting nurse removed it. On review of previous labs, Scr ranges from 1.3-1.5.  Pt. says that he saw his transplant nephrologist Dr. Miller on 18 and Scr was 1.3 on labs done during office visit. However Pt. found to have elevated Scr of 2.2 on labs in ER.      Patient was seen and examined at bedside today. Upset about his current medical condition. Patient feels well overall and denies shortness of breath, chest pain, nausea, vomiting, or diarrhea.    PAST HISTORY  --------------------------------------------------------------------------------  PAST MEDICAL & SURGICAL HISTORY:  Seizure: 1 episode   Hypercholesteremia  Subdural hematoma:  treated with craniotomy/ shunt  Diabetes mellitus  HTN (hypertension)  MI (myocardial infarction)  CAD (coronary artery disease)  Kidney transplanted:  donor   Pacemaker: [employer]  History of ventricular shunt:   History of craniotomy:   H/O unilateral nephrectomy  S/P primary angioplasty with coronary stent: 4 total stents (first stent placed , one year later other 3 stents placed)    FAMILY HISTORY:  Family history of hypertension  Family history of hyperlipidemia  Family history of diabetes mellitus (Sibling)  Family history of myocardial infarction    PAST SOCIAL HISTORY:    ALLERGIES & MEDICATIONS  --------------------------------------------------------------------------------  Allergies    dapsone (Unknown)    Intolerances    Standing Inpatient Medications  amLODIPine   Tablet 10 milliGRAM(s) Oral daily  aspirin enteric coated 81 milliGRAM(s) Oral daily  atovaquone Suspension 1500 milliGRAM(s) Oral daily  cefTRIAXone   IVPB 2 Gram(s) IV Intermittent every 24 hours  cefTRIAXone   IVPB      clopidogrel Tablet 75 milliGRAM(s) Oral daily  dextrose 5%. 1000 milliLiter(s) IV Continuous <Continuous>  dextrose 5%. 1000 milliLiter(s) IV Continuous <Continuous>  dextrose 50% Injectable 12.5 Gram(s) IV Push once  dextrose 50% Injectable 25 Gram(s) IV Push once  dextrose 50% Injectable 25 Gram(s) IV Push once  heparin  Injectable 5000 Unit(s) SubCutaneous every 12 hours  hydrALAZINE 75 milliGRAM(s) Oral every 8 hours  insulin lispro (HumaLOG) corrective regimen sliding scale   SubCutaneous three times a day before meals  insulin lispro (HumaLOG) corrective regimen sliding scale   SubCutaneous at bedtime  insulin lispro Injectable (HumaLOG) 1 Unit(s) SubCutaneous three times a day before meals  isosorbide   mononitrate ER Tablet (IMDUR) 60 milliGRAM(s) Oral daily  levETIRAcetam 500 milliGRAM(s) Oral two times a day  mycophenolic acid  milliGRAM(s) Oral two times a day  pantoprazole    Tablet 40 milliGRAM(s) Oral before breakfast  tacrolimus 1.5 milliGRAM(s) Oral every 12 hours  vancomycin  IVPB 1000 milliGRAM(s) IV Intermittent every 24 hours    PRN Inpatient Medications  dextrose 40% Gel 15 Gram(s) Oral once PRN  glucagon  Injectable 1 milliGRAM(s) IntraMuscular once PRN    REVIEW OF SYSTEMS  --------------------------------------------------------------------------------  Gen: No fever  Respiratory: No dyspnea  CV: No chest pain  GI: No abdominal pain  MSK: No LE edema  Neuro: No dizziness  Heme: No bleeding    All other systems were reviewed and are negative, except as noted.    VITALS/PHYSICAL EXAM  --------------------------------------------------------------------------------  T(C): 36.8 (18 @ 05:34), Max: 36.9 (18 @ 14:30)  HR: 59 (18 05:34) (59 - 60)  BP: 155/71 (18 @ 05:34) (147/66 - 155/71)  RR: 18 (18 @ 05:34) (18 - 18)  SpO2: 98% (18 @ 05:34) (98% - 99%)  Wt(kg): --    18 @ 07:01  -  18 @ 07:00  --------------------------------------------------------  IN: 600 mL / OUT: 750 mL / NET: -150 mL    18 @ 07:01  -  18 @ 09:58  --------------------------------------------------------  IN: 0 mL / OUT: 300 mL / NET: -300 mL    Physical Exam:  	Gen: resting, NAD  	HEENT: No JVD  	Pulm: CTA B/L  	CV: S1S2+  	Abd: Soft, +BS   	Ext:  Healing wound on the medial aspect of the left foot. No LE edema B/L  	Neuro: Awake  	Skin: Warm and dry  	Vascular access: LUE AVF: +thrill    LABS/STUDIES  --------------------------------------------------------------------------------              12.7   7.30  >-----------<  139      [18 @ 05:30]              40.0     133  |  100  |  17  ----------------------------<  230      [18 @ 05:30]  4.4   |  19  |  1.58        Ca     9.3     [18 05:30]    Creatinine Trend:  SCr 1.58 [ 05:30]  SCr 1.77 [ 05:20]  SCr 1.90 [07-15 @ 05:35]  SCr 1.74 [ 05:55]  SCr 2.00 [ 05:30]    C3 Complement 139.3      [18 05:20]  C4 Complement 32.6      [18 05:20] denies pain/discomfort

## 2018-08-03 ENCOUNTER — APPOINTMENT (OUTPATIENT)
Dept: INTERNAL MEDICINE | Facility: CLINIC | Age: 53
End: 2018-08-03
Payer: MEDICAID

## 2018-08-03 VITALS
OXYGEN SATURATION: 98 % | HEIGHT: 67 IN | HEART RATE: 61 BPM | BODY MASS INDEX: 28.41 KG/M2 | WEIGHT: 181 LBS | TEMPERATURE: 98 F

## 2018-08-03 PROCEDURE — 99214 OFFICE O/P EST MOD 30 MIN: CPT

## 2018-08-06 ENCOUNTER — APPOINTMENT (OUTPATIENT)
Dept: INFECTIOUS DISEASE | Facility: CLINIC | Age: 53
End: 2018-08-06

## 2018-08-09 ENCOUNTER — OTHER (OUTPATIENT)
Age: 53
End: 2018-08-09

## 2018-08-13 ENCOUNTER — MEDICATION RENEWAL (OUTPATIENT)
Age: 53
End: 2018-08-13

## 2018-08-20 ENCOUNTER — APPOINTMENT (OUTPATIENT)
Dept: ELECTROPHYSIOLOGY | Facility: CLINIC | Age: 53
End: 2018-08-20
Payer: MEDICAID

## 2018-08-20 PROCEDURE — 93296 REM INTERROG EVL PM/IDS: CPT

## 2018-08-20 PROCEDURE — 93294 REM INTERROG EVL PM/LDLS PM: CPT

## 2018-08-27 NOTE — HISTORY OF PRESENT ILLNESS
[FreeTextEntry2] : Hospitalized for bacteremia. completed vancomycin and ceftriaxone.\par \par Nurse came and removed PICC line\par \par # feeling dizzy - c/w feeling hightheaded, no falls, \par \par ROS: L foot/leg feels weak; no cp, no SOB, +insomnia 2/2 feelign hot. \par \par Appt: 8/6/18 -- has appt with ID

## 2018-08-27 NOTE — PHYSICAL EXAM
[PERRL] : pupils equal round and reactive to light [EOMI] : extraocular movements intact [Normal Oropharynx] : the oropharynx was normal [Supple] : supple [No Lymphadenopathy] : no lymphadenopathy [No Respiratory Distress] : no respiratory distress  [Clear to Auscultation] : lungs were clear to auscultation bilaterally [No Accessory Muscle Use] : no accessory muscle use [Soft] : abdomen soft [Non Tender] : non-tender [Non-distended] : non-distended [de-identified] : middle aged M [de-identified] : L thrill

## 2018-08-27 NOTE — ASSESSMENT
[FreeTextEntry1] : 54yo M with ESRD, T2DM who comes in for post discharge visit. Pt was hospitalized for bacteremia and completed a course of vancomycin and ceftriaxone.\par \par MRSA bacteremia - s/p removal of PICC line; afebrile \par \par T2DM - on insulin - controlled\par \par s/p Kidney transplant - on immunosuppressives - follows with nephrology\par

## 2018-08-31 ENCOUNTER — MEDICATION RENEWAL (OUTPATIENT)
Age: 53
End: 2018-08-31

## 2018-09-20 ENCOUNTER — MEDICATION RENEWAL (OUTPATIENT)
Age: 53
End: 2018-09-20

## 2018-10-01 ENCOUNTER — APPOINTMENT (OUTPATIENT)
Dept: NEPHROLOGY | Facility: CLINIC | Age: 53
End: 2018-10-01

## 2018-11-08 ENCOUNTER — APPOINTMENT (OUTPATIENT)
Dept: ELECTROPHYSIOLOGY | Facility: CLINIC | Age: 53
End: 2018-11-08
Payer: MEDICAID

## 2018-11-08 ENCOUNTER — NON-APPOINTMENT (OUTPATIENT)
Age: 53
End: 2018-11-08

## 2018-11-08 ENCOUNTER — APPOINTMENT (OUTPATIENT)
Dept: CARDIOLOGY | Facility: CLINIC | Age: 53
End: 2018-11-08
Payer: MEDICAID

## 2018-11-08 VITALS
BODY MASS INDEX: 27.62 KG/M2 | SYSTOLIC BLOOD PRESSURE: 199 MMHG | OXYGEN SATURATION: 98 % | HEIGHT: 67 IN | DIASTOLIC BLOOD PRESSURE: 102 MMHG | HEART RATE: 60 BPM | WEIGHT: 176 LBS

## 2018-11-08 VITALS — RESPIRATION RATE: 14 BRPM | HEART RATE: 60 BPM | SYSTOLIC BLOOD PRESSURE: 176 MMHG | DIASTOLIC BLOOD PRESSURE: 89 MMHG

## 2018-11-08 VITALS — DIASTOLIC BLOOD PRESSURE: 93 MMHG | SYSTOLIC BLOOD PRESSURE: 188 MMHG

## 2018-11-08 DIAGNOSIS — Z87.891 PERSONAL HISTORY OF NICOTINE DEPENDENCE: ICD-10-CM

## 2018-11-08 PROCEDURE — 93000 ELECTROCARDIOGRAM COMPLETE: CPT | Mod: 59

## 2018-11-08 PROCEDURE — 99214 OFFICE O/P EST MOD 30 MIN: CPT

## 2018-11-08 PROCEDURE — 93280 PM DEVICE PROGR EVAL DUAL: CPT

## 2018-11-08 RX ORDER — CARVEDILOL 6.25 MG/1
6.25 TABLET, FILM COATED ORAL TWICE DAILY
Qty: 60 | Refills: 5 | Status: DISCONTINUED | COMMUNITY
Start: 2017-07-27 | End: 2018-11-08

## 2018-11-08 NOTE — REASON FOR VISIT
[Follow-Up - Clinic] : a clinic follow-up of [Coronary Artery Disease] : coronary artery disease [Heart Failure] : congestive heart failure

## 2018-11-12 ENCOUNTER — APPOINTMENT (OUTPATIENT)
Dept: NEPHROLOGY | Facility: CLINIC | Age: 53
End: 2018-11-12
Payer: MEDICAID

## 2018-11-12 VITALS
TEMPERATURE: 98.5 F | DIASTOLIC BLOOD PRESSURE: 86 MMHG | WEIGHT: 178 LBS | OXYGEN SATURATION: 99 % | SYSTOLIC BLOOD PRESSURE: 174 MMHG | HEIGHT: 67 IN | HEART RATE: 60 BPM | RESPIRATION RATE: 14 BRPM | BODY MASS INDEX: 27.94 KG/M2

## 2018-11-12 VITALS — DIASTOLIC BLOOD PRESSURE: 84 MMHG | SYSTOLIC BLOOD PRESSURE: 140 MMHG

## 2018-11-12 LAB
ALBUMIN SERPL ELPH-MCNC: 3.8 G/DL
ALP BLD-CCNC: 49 U/L
ALT SERPL-CCNC: 30 U/L
ANION GAP SERPL CALC-SCNC: 16 MMOL/L
APPEARANCE: CLEAR
AST SERPL-CCNC: 30 U/L
BASOPHILS # BLD AUTO: 0.03 K/UL
BASOPHILS NFR BLD AUTO: 0.4 %
BILIRUB SERPL-MCNC: 0.6 MG/DL
BILIRUBIN URINE: NEGATIVE
BKV DNA SPEC QL NAA+PROBE: NORMAL
BLOOD URINE: NEGATIVE
BUN SERPL-MCNC: 20 MG/DL
CALCIUM SERPL-MCNC: 9.9 MG/DL
CHLORIDE SERPL-SCNC: 100 MMOL/L
CHOLEST SERPL-MCNC: 128 MG/DL
CHOLEST/HDLC SERPL: 5.1 RATIO
CO2 SERPL-SCNC: 19 MMOL/L
COLOR: YELLOW
CREAT SERPL-MCNC: 1.3 MG/DL
CREAT SPEC-SCNC: 129 MG/DL
CREAT/PROT UR: 0.4 RATIO
EOSINOPHIL # BLD AUTO: 0.28 K/UL
EOSINOPHIL NFR BLD AUTO: 3.6 %
GLUCOSE QUALITATIVE U: 500 MG/DL
GLUCOSE SERPL-MCNC: 247 MG/DL
HBA1C MFR BLD HPLC: 6.9 %
HCT VFR BLD CALC: 44.4 %
HDLC SERPL-MCNC: 25 MG/DL
HGB BLD-MCNC: 13.4 G/DL
IMM GRANULOCYTES NFR BLD AUTO: 0.5 %
KETONES URINE: NEGATIVE
LDH SERPL-CCNC: 261 U/L
LDLC SERPL CALC-MCNC: 60 MG/DL
LEUKOCYTE ESTERASE URINE: NEGATIVE
LYMPHOCYTES # BLD AUTO: 2.08 K/UL
LYMPHOCYTES NFR BLD AUTO: 27 %
MAGNESIUM SERPL-MCNC: 1.8 MG/DL
MAN DIFF?: NORMAL
MCHC RBC-ENTMCNC: 26.4 PG
MCHC RBC-ENTMCNC: 30.2 GM/DL
MCV RBC AUTO: 87.6 FL
MONOCYTES # BLD AUTO: 0.76 K/UL
MONOCYTES NFR BLD AUTO: 9.9 %
NEUTROPHILS # BLD AUTO: 4.52 K/UL
NEUTROPHILS NFR BLD AUTO: 58.6 %
NITRITE URINE: NEGATIVE
PH URINE: 5.5
PHOSPHATE SERPL-MCNC: 3.3 MG/DL
PLATELET # BLD AUTO: 277 K/UL
POTASSIUM SERPL-SCNC: 4.5 MMOL/L
PROT SERPL-MCNC: 8 G/DL
PROT UR-MCNC: 47 MG/DL
PROTEIN URINE: 100 MG/DL
RBC # BLD: 5.07 M/UL
RBC # FLD: 15.6 %
SODIUM SERPL-SCNC: 135 MMOL/L
SPECIFIC GRAVITY URINE: 1.02
TACROLIMUS SERPL-MCNC: 5.9 NG/ML
TRIGL SERPL-MCNC: 217 MG/DL
URATE SERPL-MCNC: 6.8 MG/DL
UROBILINOGEN URINE: NEGATIVE MG/DL
WBC # FLD AUTO: 7.71 K/UL

## 2018-11-12 PROCEDURE — 99214 OFFICE O/P EST MOD 30 MIN: CPT

## 2018-11-13 LAB
ALBUMIN SERPL ELPH-MCNC: 4.4 G/DL
ALP BLD-CCNC: 72 U/L
ALT SERPL-CCNC: 19 U/L
ANION GAP SERPL CALC-SCNC: 13 MMOL/L
APPEARANCE: CLEAR
AST SERPL-CCNC: 17 U/L
BACTERIA: NEGATIVE
BASOPHILS # BLD AUTO: 0.03 K/UL
BASOPHILS NFR BLD AUTO: 0.3 %
BILIRUB SERPL-MCNC: 0.5 MG/DL
BILIRUBIN URINE: NEGATIVE
BLOOD URINE: ABNORMAL
BUN SERPL-MCNC: 32 MG/DL
CALCIUM SERPL-MCNC: 10 MG/DL
CHLORIDE SERPL-SCNC: 96 MMOL/L
CMV DNA SPEC QL NAA+PROBE: NOT DETECTED IU/ML
CMVPCR LOG: NOT DETECTED LOGIU/ML
CO2 SERPL-SCNC: 22 MMOL/L
COLOR: YELLOW
CREAT SERPL-MCNC: 1.63 MG/DL
CREAT SPEC-SCNC: 66 MG/DL
CREAT/PROT UR: 1.4 RATIO
EOSINOPHIL # BLD AUTO: 0.63 K/UL
EOSINOPHIL NFR BLD AUTO: 7.1 %
GLUCOSE QUALITATIVE U: 1000 MG/DL
GLUCOSE SERPL-MCNC: 273 MG/DL
HCT VFR BLD CALC: 47.3 %
HGB BLD-MCNC: 14.6 G/DL
HYALINE CASTS: 0 /LPF
IMM GRANULOCYTES NFR BLD AUTO: 0.5 %
KETONES URINE: NEGATIVE
LDH SERPL-CCNC: 243 U/L
LEUKOCYTE ESTERASE URINE: NEGATIVE
LYMPHOCYTES # BLD AUTO: 1.96 K/UL
LYMPHOCYTES NFR BLD AUTO: 22.1 %
MAGNESIUM SERPL-MCNC: 1.6 MG/DL
MAN DIFF?: NORMAL
MCHC RBC-ENTMCNC: 26.8 PG
MCHC RBC-ENTMCNC: 30.9 GM/DL
MCV RBC AUTO: 86.9 FL
MICROSCOPIC-UA: NORMAL
MONOCYTES # BLD AUTO: 0.96 K/UL
MONOCYTES NFR BLD AUTO: 10.8 %
NEUTROPHILS # BLD AUTO: 5.24 K/UL
NEUTROPHILS NFR BLD AUTO: 59.2 %
NITRITE URINE: NEGATIVE
PH URINE: 6
PHOSPHATE SERPL-MCNC: 2.9 MG/DL
PLATELET # BLD AUTO: 148 K/UL
POTASSIUM SERPL-SCNC: 5.3 MMOL/L
PROT SERPL-MCNC: 8.2 G/DL
PROT UR-MCNC: 95 MG/DL
PROTEIN URINE: 100 MG/DL
RBC # BLD: 5.44 M/UL
RBC # FLD: 13.9 %
RED BLOOD CELLS URINE: 6 /HPF
SODIUM SERPL-SCNC: 131 MMOL/L
SPECIFIC GRAVITY URINE: 1.02
SQUAMOUS EPITHELIAL CELLS: 1 /HPF
TACROLIMUS SERPL-MCNC: 27.1 NG/ML
URATE SERPL-MCNC: 8.4 MG/DL
UROBILINOGEN URINE: NEGATIVE MG/DL
WBC # FLD AUTO: 8.86 K/UL
WHITE BLOOD CELLS URINE: 1 /HPF

## 2018-11-13 NOTE — DISCUSSION/SUMMARY
[Cardiomyopathy] : cardiomyopathy [Coronary Artery Disease] : coronary artery disease [Stable] : stable [Hypertension] : hypertension [FreeTextEntry1] : \par Currently stable from a cardiovascular standpoint. Hypertensive today. Chronic systolic heart failure secondary to ischemic cardiomyopathy. Currently euvolemic. Stable CAD (prior stents). No ischemic or CHF symptoms at this time. Patient with dual chamber pacemaker. History of renal transplant. Will restart losartan at 50 mg daily for BP management (had been off of losartan). Continue all other current medications. ECG completed today and reviewed (findings as noted above). Follow up in 3 months.

## 2018-11-13 NOTE — PHYSICAL EXAM
[General Appearance - Well Developed] : well developed [Normal Appearance] : normal appearance [Well Groomed] : well groomed [General Appearance - Well Nourished] : well nourished [No Deformities] : no deformities [General Appearance - In No Acute Distress] : no acute distress [Normal Conjunctiva] : the conjunctiva exhibited no abnormalities [Eyelids - No Xanthelasma] : the eyelids demonstrated no xanthelasmas [Normal Oral Mucosa] : normal oral mucosa [No Oral Pallor] : no oral pallor [No Oral Cyanosis] : no oral cyanosis [Respiration, Rhythm And Depth] : normal respiratory rhythm and effort [Exaggerated Use Of Accessory Muscles For Inspiration] : no accessory muscle use [Auscultation Breath Sounds / Voice Sounds] : lungs were clear to auscultation bilaterally [Heart Rate And Rhythm] : heart rate and rhythm were normal [Heart Sounds] : normal S1 and S2 [Murmurs] : no murmurs present [Edema] : no peripheral edema present [Abdomen Soft] : soft [Abdomen Tenderness] : non-tender [Abnormal Walk] : normal gait [Nail Clubbing] : no clubbing of the fingernails [Cyanosis, Localized] : no localized cyanosis [Petechial Hemorrhages (___cm)] : no petechial hemorrhages [Skin Color & Pigmentation] : normal skin color and pigmentation [] : no rash [No Venous Stasis] : no venous stasis [Skin Lesions] : no skin lesions [No Skin Ulcers] : no skin ulcer [No Xanthoma] : no  xanthoma was observed [Oriented To Time, Place, And Person] : oriented to person, place, and time [FreeTextEntry1] : left forearm fistula

## 2018-11-13 NOTE — HISTORY OF PRESENT ILLNESS
[FreeTextEntry1] : Doing okay. Denies chest pain, shortness of breath or palpitations. Since his last visit, he had an infection over the summer. BP has been running high of late.

## 2018-11-15 LAB — BKV DNA SPEC QL NAA+PROBE: NORMAL

## 2018-11-16 ENCOUNTER — APPOINTMENT (OUTPATIENT)
Dept: INTERNAL MEDICINE | Facility: CLINIC | Age: 53
End: 2018-11-16
Payer: MEDICAID

## 2018-11-16 VITALS
DIASTOLIC BLOOD PRESSURE: 70 MMHG | SYSTOLIC BLOOD PRESSURE: 144 MMHG | WEIGHT: 176 LBS | HEIGHT: 67 IN | OXYGEN SATURATION: 98 % | TEMPERATURE: 98.3 F | HEART RATE: 68 BPM | BODY MASS INDEX: 27.62 KG/M2

## 2018-11-16 PROCEDURE — 83036 HEMOGLOBIN GLYCOSYLATED A1C: CPT | Mod: QW

## 2018-11-16 PROCEDURE — 99214 OFFICE O/P EST MOD 30 MIN: CPT | Mod: 25

## 2018-11-16 NOTE — ASSESSMENT
[FreeTextEntry1] : 52yo M with renal transplant/ esrd, T2DM, HTN here for follow up.\par \par MRSA bacteremia - on abx until 8/1/18 - will place home health referral to get assistance with vitals (BP, temp) and infusion of abx as pt was out of saline flushes and used tap water - I informed pt to use only normal saline for PICC flushes. \par \par Kidney transplant - elevated cr of 1.6 on recent blood draw.\par \par T2DM on insulin, controlled, cont meds. sees ophthol\par \par HTN - systolic BP elevated - monitor\par \par HCM - cpe in 2019\par            had colo\par            Hep C neg\par         \par >50% of this 25 minute visit was spent in face-to-face time counseling patient on management of diabetes and recent bacteremia.

## 2018-11-16 NOTE — HISTORY OF PRESENT ILLNESS
[FreeTextEntry1] : T2DM\par  [de-identified] : 52yo M with renal transplant, T2DM, CAD, CVA here for follow up.\par \par Reports cramps in the leg. \par \par Hx of hospitalization for bacteremia - was on IV meds via PICC - has not seen ID\par \par Med Rec with pharmacy student - takes losartan 50mg twice, recently started admelog, takes bisacodyl; takes ppi for acid stomach\par \par Blood sugar was 155 fasting earlier this week- did not check today. No recent low blood sugar. Says in [place] he gets low blood sugar.\par \par Jan 2019 - need disability paperwork done\par Remainder of ROS reviewed and found to be negative.\par

## 2018-11-16 NOTE — PHYSICAL EXAM
[PERRL] : pupils equal round and reactive to light [EOMI] : extraocular movements intact [Normal Oropharynx] : the oropharynx was normal [Supple] : supple [No Lymphadenopathy] : no lymphadenopathy [No Respiratory Distress] : no respiratory distress  [Clear to Auscultation] : lungs were clear to auscultation bilaterally [No Accessory Muscle Use] : no accessory muscle use [Soft] : abdomen soft [Non Tender] : non-tender [Non-distended] : non-distended [de-identified] : middle aged M

## 2018-11-17 LAB — HBA1C MFR BLD HPLC: 6.8

## 2018-12-06 ENCOUNTER — MEDICATION RENEWAL (OUTPATIENT)
Age: 53
End: 2018-12-06

## 2019-01-09 ENCOUNTER — MEDICATION RENEWAL (OUTPATIENT)
Age: 54
End: 2019-01-09

## 2019-01-24 ENCOUNTER — APPOINTMENT (OUTPATIENT)
Dept: CARDIOLOGY | Facility: CLINIC | Age: 54
End: 2019-01-24

## 2019-01-25 ENCOUNTER — APPOINTMENT (OUTPATIENT)
Dept: INTERNAL MEDICINE | Facility: CLINIC | Age: 54
End: 2019-01-25
Payer: MEDICAID

## 2019-01-25 VITALS
OXYGEN SATURATION: 97 % | SYSTOLIC BLOOD PRESSURE: 150 MMHG | TEMPERATURE: 98.5 F | DIASTOLIC BLOOD PRESSURE: 90 MMHG | BODY MASS INDEX: 29.51 KG/M2 | HEART RATE: 60 BPM | WEIGHT: 188 LBS | HEIGHT: 67 IN

## 2019-01-25 PROCEDURE — 99213 OFFICE O/P EST LOW 20 MIN: CPT

## 2019-01-26 NOTE — HISTORY OF PRESENT ILLNESS
[FreeTextEntry1] : T2dm\par transplant recipient [de-identified] : t2dm - has meds, no low sugars. taking humolog andn ot admelog\par \par trnasplant - on tacrolimus - levels moitored by nephrology -\par \par Hx of mrsa bacteremia - has neot seen ID\par \par SOc hx: needs disability lettr for the year, goign to [place] for next 3 months \par Remainder of ROS reviewed and found to be negative.\par \par

## 2019-01-26 NOTE — PHYSICAL EXAM
[PERRL] : pupils equal round and reactive to light [EOMI] : extraocular movements intact [Normal Oropharynx] : the oropharynx was normal [Supple] : supple [No Lymphadenopathy] : no lymphadenopathy [No Respiratory Distress] : no respiratory distress  [Clear to Auscultation] : lungs were clear to auscultation bilaterally [No Accessory Muscle Use] : no accessory muscle use [Soft] : abdomen soft [Non Tender] : non-tender [Non-distended] : non-distended [de-identified] : middle aged M

## 2019-01-28 ENCOUNTER — APPOINTMENT (OUTPATIENT)
Dept: NEPHROLOGY | Facility: CLINIC | Age: 54
End: 2019-01-28
Payer: MEDICAID

## 2019-01-28 VITALS
OXYGEN SATURATION: 99 % | HEIGHT: 67 IN | TEMPERATURE: 98.3 F | HEART RATE: 60 BPM | BODY MASS INDEX: 29.51 KG/M2 | WEIGHT: 188 LBS | RESPIRATION RATE: 17 BRPM | SYSTOLIC BLOOD PRESSURE: 189 MMHG | DIASTOLIC BLOOD PRESSURE: 86 MMHG

## 2019-01-28 VITALS — DIASTOLIC BLOOD PRESSURE: 80 MMHG | SYSTOLIC BLOOD PRESSURE: 140 MMHG

## 2019-01-28 DIAGNOSIS — E78.5 HYPERLIPIDEMIA, UNSPECIFIED: ICD-10-CM

## 2019-01-28 LAB
BASOPHILS # BLD AUTO: 0.01 K/UL
BASOPHILS NFR BLD AUTO: 0.1 %
EOSINOPHIL # BLD AUTO: 0.41 K/UL
EOSINOPHIL NFR BLD AUTO: 5.1 %
HCT VFR BLD CALC: 45.9 %
HGB BLD-MCNC: 14.1 G/DL
IMM GRANULOCYTES NFR BLD AUTO: 0.5 %
LYMPHOCYTES # BLD AUTO: 2.02 K/UL
LYMPHOCYTES NFR BLD AUTO: 25.3 %
MAN DIFF?: NORMAL
MCHC RBC-ENTMCNC: 28.1 PG
MCHC RBC-ENTMCNC: 30.7 GM/DL
MCV RBC AUTO: 91.4 FL
MONOCYTES # BLD AUTO: 0.93 K/UL
MONOCYTES NFR BLD AUTO: 11.6 %
NEUTROPHILS # BLD AUTO: 4.58 K/UL
NEUTROPHILS NFR BLD AUTO: 57.4 %
PLATELET # BLD AUTO: 156 K/UL
RBC # BLD: 5.02 M/UL
RBC # FLD: 15.2 %
WBC # FLD AUTO: 7.99 K/UL

## 2019-01-28 PROCEDURE — 99214 OFFICE O/P EST MOD 30 MIN: CPT

## 2019-01-28 RX ORDER — TACROLIMUS 0.5 MG/1
0.5 CAPSULE ORAL
Qty: 180 | Refills: 3 | Status: DISCONTINUED | COMMUNITY
Start: 2017-07-10 | End: 2019-01-28

## 2019-01-28 NOTE — ASSESSMENT
[FreeTextEntry1] : 53 year old M DDRT 2/3/14; seen on follow up for kidney transplant prior h/o MRSA bacteremia treated with Daptomycin. \par \par DDRT 2/2/14 - currently doing well. will check labs. \par \par DM- AM glucose 152 today. Discussed target level. Advised endocrinology follow up.\par \par HTN-  Reviewed. Discussed target level.\par \par CAD; continue current management. Has pacemaker, Had f/u with Dr. Ron.\par \par Health Maintenance: Derm exam, advised  yearly. \par \par Advised follow up every 6 months. Completing 5 years post transplant.\par

## 2019-01-28 NOTE — HISTORY OF PRESENT ILLNESS
[FreeTextEntry1] : 53 year old male with hx of DM, CAD s/ PCI, PPM,  DDRT 2/3/14 at [facility] on Prograf and Myfortic. \par He was in [place] in December.\par \par Currently: No acute symptoms. Overall doing well.\par No urinary symptoms. No fever. \par Plans to go to [place] on 1/29/19. returning in April 2019.\par Medications reviewed and updated.\par \par \par \par \par \par

## 2019-01-28 NOTE — PHYSICAL EXAM
[General Appearance - Alert] : alert [General Appearance - In No Acute Distress] : in no acute distress [General Appearance - Well Nourished] : well nourished [General Appearance - Well Developed] : well developed [Sclera] : the sclera and conjunctiva were normal [PERRL With Normal Accommodation] : pupils were equal in size, round, and reactive to light [Extraocular Movements] : extraocular movements were intact [Outer Ear] : the ears and nose were normal in appearance [Examination Of The Oral Cavity] : the lips and gums were normal [Neck Appearance] : the appearance of the neck was normal [Neck Cervical Mass (___cm)] : no neck mass was observed [Jugular Venous Distention Increased] : there was no jugular-venous distention [Respiration, Rhythm And Depth] : normal respiratory rhythm and effort [Exaggerated Use Of Accessory Muscles For Inspiration] : no accessory muscle use [Auscultation Breath Sounds / Voice Sounds] : lungs were clear to auscultation bilaterally [Heart Rate And Rhythm] : heart rate was normal and rhythm regular [Heart Sounds] : normal S1 and S2 [Heart Sounds Gallop] : no gallops [Heart Sounds Pericardial Friction Rub] : no pericardial rub [Edema] : there was no peripheral edema [Bowel Sounds] : normal bowel sounds [Abdomen Soft] : soft [Abdomen Tenderness] : non-tender [Abdomen Mass (___ Cm)] : no abdominal mass palpated [Cervical Lymph Nodes Enlarged Posterior Bilaterally] : posterior cervical [Cervical Lymph Nodes Enlarged Anterior Bilaterally] : anterior cervical [Supraclavicular Lymph Nodes Enlarged Bilaterally] : supraclavicular [Axillary Lymph Nodes Enlarged Bilaterally] : axillary [Femoral Lymph Nodes Enlarged Bilaterally] : femoral [Inguinal Lymph Nodes Enlarged Bilaterally] : inguinal [Abnormal Walk] : normal gait [Nail Clubbing] : no clubbing  or cyanosis of the fingernails [Musculoskeletal - Swelling] : no joint swelling seen [___ (cm) Fistula] : [unfilled] (cm) fistula [Bruit] : a bruit was present [Thrill] : a thrill was present [Skin Color & Pigmentation] : normal skin color and pigmentation [Skin Turgor] : normal skin turgor [] : no rash [Cranial Nerves] : cranial nerves 2-12 were intact [Sensation] : the sensory exam was normal to light touch and pinprick [Motor Exam] : the motor exam was normal [No Focal Deficits] : no focal deficits [Oriented To Time, Place, And Person] : oriented to person, place, and time [Impaired Insight] : insight and judgment were intact [Affect] : the affect was normal [Mood] : the mood was normal [FreeTextEntry1] : Left foot esion has healed fully.

## 2019-01-29 LAB
25(OH)D3 SERPL-MCNC: 14.4 NG/ML
ALBUMIN SERPL ELPH-MCNC: 4.5 G/DL
ALP BLD-CCNC: 60 U/L
ALT SERPL-CCNC: 17 U/L
ANION GAP SERPL CALC-SCNC: 11 MMOL/L
APPEARANCE: CLEAR
AST SERPL-CCNC: 20 U/L
BACTERIA: NEGATIVE
BILIRUB SERPL-MCNC: 0.9 MG/DL
BILIRUBIN URINE: NEGATIVE
BLOOD URINE: ABNORMAL
BUN SERPL-MCNC: 22 MG/DL
CALCIUM SERPL-MCNC: 10.7 MG/DL
CHLORIDE SERPL-SCNC: 99 MMOL/L
CHOLEST SERPL-MCNC: 184 MG/DL
CHOLEST/HDLC SERPL: 4.4 RATIO
CMV IGG SERPL QL: >10 U/ML
CMV IGG SERPL-IMP: POSITIVE
CO2 SERPL-SCNC: 26 MMOL/L
COLOR: YELLOW
CREAT SERPL-MCNC: 1.47 MG/DL
CREAT SPEC-SCNC: 66 MG/DL
CREAT/PROT UR: 1.8 RATIO
GLUCOSE QUALITATIVE U: 1000 MG/DL
GLUCOSE SERPL-MCNC: 236 MG/DL
HBA1C MFR BLD HPLC: 7 %
HDLC SERPL-MCNC: 42 MG/DL
HYALINE CASTS: 1 /LPF
KETONES URINE: NEGATIVE
LDH SERPL-CCNC: 289 U/L
LDLC SERPL CALC-MCNC: 92 MG/DL
LEUKOCYTE ESTERASE URINE: NEGATIVE
MAGNESIUM SERPL-MCNC: 1.7 MG/DL
MICROSCOPIC-UA: NORMAL
NITRITE URINE: NEGATIVE
PH URINE: 6
PHOSPHATE SERPL-MCNC: 3.2 MG/DL
POTASSIUM SERPL-SCNC: 5.3 MMOL/L
PROT SERPL-MCNC: 7.9 G/DL
PROT UR-MCNC: 117 MG/DL
PROTEIN URINE: 100 MG/DL
RED BLOOD CELLS URINE: 2 /HPF
SODIUM SERPL-SCNC: 136 MMOL/L
SPECIFIC GRAVITY URINE: 1.03
SQUAMOUS EPITHELIAL CELLS: 3 /HPF
TACROLIMUS SERPL-MCNC: 11.6 NG/ML
TRIGL SERPL-MCNC: 251 MG/DL
URATE SERPL-MCNC: 6.4 MG/DL
UROBILINOGEN URINE: NEGATIVE MG/DL
WHITE BLOOD CELLS URINE: 1 /HPF

## 2019-01-30 LAB
BKV DNA SPEC QL NAA+PROBE: NOT DETECTED COPIES/ML
CMV DNA SPEC QL NAA+PROBE: NOT DETECTED IU/ML
CMVPCR LOG: NOT DETECTED LOGIU/ML

## 2019-02-12 ENCOUNTER — APPOINTMENT (OUTPATIENT)
Dept: ELECTROPHYSIOLOGY | Facility: CLINIC | Age: 54
End: 2019-02-12
Payer: MEDICAID

## 2019-02-12 PROCEDURE — XXXXX: CPT

## 2019-03-01 ENCOUNTER — MEDICATION RENEWAL (OUTPATIENT)
Age: 54
End: 2019-03-01

## 2019-04-01 ENCOUNTER — APPOINTMENT (OUTPATIENT)
Dept: ENDOCRINOLOGY | Facility: CLINIC | Age: 54
End: 2019-04-01

## 2019-04-02 ENCOUNTER — RX RENEWAL (OUTPATIENT)
Age: 54
End: 2019-04-02

## 2019-05-09 ENCOUNTER — RX RENEWAL (OUTPATIENT)
Age: 54
End: 2019-05-09

## 2019-05-14 ENCOUNTER — APPOINTMENT (OUTPATIENT)
Dept: INTERNAL MEDICINE | Facility: CLINIC | Age: 54
End: 2019-05-14
Payer: MEDICAID

## 2019-05-14 VITALS
WEIGHT: 175 LBS | OXYGEN SATURATION: 98 % | HEIGHT: 67 IN | TEMPERATURE: 97.7 F | SYSTOLIC BLOOD PRESSURE: 150 MMHG | HEART RATE: 61 BPM | BODY MASS INDEX: 27.47 KG/M2 | DIASTOLIC BLOOD PRESSURE: 90 MMHG

## 2019-05-14 PROCEDURE — 82962 GLUCOSE BLOOD TEST: CPT

## 2019-05-14 PROCEDURE — 83036 HEMOGLOBIN GLYCOSYLATED A1C: CPT | Mod: QW

## 2019-05-14 PROCEDURE — 99214 OFFICE O/P EST MOD 30 MIN: CPT

## 2019-05-15 NOTE — ASSESSMENT
[FreeTextEntry1] : 53yo M with T2DM, htn, esrd + kidney transplant here for follow up after hospitalization while in [place] with foot wound.\par \par T2DM - has meds, no low sugars. taking humolog and not admelog - cont regimen\par \par kidney transplant - on tacrolimus - levels monitored by nephrology - monitor cr, avoid nephrotoxic agents\par \par Hx of MRSA bacteremia and now with recent foot wound - he dressed wound himself -it appears healed\par \par HTN - uncontrolled - cont meds

## 2019-05-15 NOTE — HISTORY OF PRESENT ILLNESS
[FreeTextEntry1] : T2DM\par HTN\par Kidney transplant [de-identified] : 55yo M with hx of kidney transplant, t2dm, htn here for follow up\par in March 2019 - was hospitalized at [facility]\par nail went through shoe - he was in hospital 8 days- followed by podiatry\par applies "new skin" to wound -helps with healing\par has had issues with the foot in the past\par could go to a local podiatry if foot gets worst\par \par s/p R catheterization procedure- (Dr. Myles) on 2/21/19 - sealed with Mynx sealant\par \par Remainder of ROS reviewed and found to be negative.\par \par

## 2019-05-15 NOTE — PHYSICAL EXAM
[de-identified] : middle aged M nad [de-identified] : L foot with healed scar between 1st big toe and 2nd toe [de-identified] : L hand av fistula

## 2019-05-20 ENCOUNTER — APPOINTMENT (OUTPATIENT)
Dept: NEPHROLOGY | Facility: CLINIC | Age: 54
End: 2019-05-20
Payer: MEDICAID

## 2019-05-20 VITALS
DIASTOLIC BLOOD PRESSURE: 100 MMHG | OXYGEN SATURATION: 97 % | SYSTOLIC BLOOD PRESSURE: 242 MMHG | RESPIRATION RATE: 16 BRPM | HEIGHT: 67 IN | HEART RATE: 57 BPM | BODY MASS INDEX: 28.72 KG/M2 | WEIGHT: 183 LBS | TEMPERATURE: 97.9 F

## 2019-05-20 VITALS — DIASTOLIC BLOOD PRESSURE: 92 MMHG | SYSTOLIC BLOOD PRESSURE: 140 MMHG

## 2019-05-20 PROCEDURE — 99214 OFFICE O/P EST MOD 30 MIN: CPT

## 2019-05-20 NOTE — PHYSICAL EXAM
[General Appearance - In No Acute Distress] : in no acute distress [General Appearance - Alert] : alert [General Appearance - Well Nourished] : well nourished [General Appearance - Well Developed] : well developed [PERRL With Normal Accommodation] : pupils were equal in size, round, and reactive to light [Sclera] : the sclera and conjunctiva were normal [Extraocular Movements] : extraocular movements were intact [Outer Ear] : the ears and nose were normal in appearance [Examination Of The Oral Cavity] : the lips and gums were normal [Neck Appearance] : the appearance of the neck was normal [Jugular Venous Distention Increased] : there was no jugular-venous distention [Neck Cervical Mass (___cm)] : no neck mass was observed [Exaggerated Use Of Accessory Muscles For Inspiration] : no accessory muscle use [Respiration, Rhythm And Depth] : normal respiratory rhythm and effort [Heart Sounds] : normal S1 and S2 [Auscultation Breath Sounds / Voice Sounds] : lungs were clear to auscultation bilaterally [Heart Rate And Rhythm] : heart rate was normal and rhythm regular [Heart Sounds Pericardial Friction Rub] : no pericardial rub [Heart Sounds Gallop] : no gallops [Edema] : there was no peripheral edema [Bowel Sounds] : normal bowel sounds [Abdomen Soft] : soft [Abdomen Mass (___ Cm)] : no abdominal mass palpated [Abdomen Tenderness] : non-tender [Cervical Lymph Nodes Enlarged Posterior Bilaterally] : posterior cervical [Cervical Lymph Nodes Enlarged Anterior Bilaterally] : anterior cervical [Axillary Lymph Nodes Enlarged Bilaterally] : axillary [Supraclavicular Lymph Nodes Enlarged Bilaterally] : supraclavicular [Femoral Lymph Nodes Enlarged Bilaterally] : femoral [Abnormal Walk] : normal gait [Inguinal Lymph Nodes Enlarged Bilaterally] : inguinal [Nail Clubbing] : no clubbing  or cyanosis of the fingernails [Musculoskeletal - Swelling] : no joint swelling seen [Bruit] : a bruit was present [___ (cm) Fistula] : [unfilled] (cm) fistula [Skin Color & Pigmentation] : normal skin color and pigmentation [Thrill] : a thrill was present [FreeTextEntry1] : Left foot esion has healed fully. [Skin Turgor] : normal skin turgor [] : no rash [Cranial Nerves] : cranial nerves 2-12 were intact [Sensation] : the sensory exam was normal to light touch and pinprick [Motor Exam] : the motor exam was normal [No Focal Deficits] : no focal deficits [Affect] : the affect was normal [Impaired Insight] : insight and judgment were intact [Oriented To Time, Place, And Person] : oriented to person, place, and time [Mood] : the mood was normal

## 2019-05-20 NOTE — ASSESSMENT
[FreeTextEntry1] : 53 year old M DDRT 2/3/14; seen on follow up for kidney transplant prior h/o MRSA bacteremia treated with Daptomycin. \par \par DDRT 2/2/14 - currently doing well. will check labs. Reports his HbA1C was very well controlled.\par \par Foot wound: healing. He will follow with podiatry.\par \par DM- AM glucose 154 yesterday. Discussed target level. Advised endocrinology follow up.\par \par HTN-  Reviewed. Discussed target level.\par \par CAD; continue current management. Has pacemaker, Has scheduled  f/u with Dr. Ron.\par \par Health Maintenance: Derm exam, and Eye check  advised  yearly. Has appointment for ophthalmology\par \par Advised follow up every 6 months. Completed 5 years post transplant.\par

## 2019-05-20 NOTE — HISTORY OF PRESENT ILLNESS
[FreeTextEntry1] : 54 year old male with hx of DM, CAD s/ PCI, PPM,  DDRT 2/3/14 at [facility] on Prograf and Myfortic. \par He is planning to go to [place] in October 2019 for his brother's 60th birthday.\par \par Currently: \par He was admitted to [facility] in [place] for 8 days in March for left foot injury from a nail. Now healing not fully healed. No amputations were done though was considered.\par no fever, no pain.\par Returned from [place] in April.\par No acute symptoms. Overall doing well.\par No urinary symptoms. No fever. \par Plans to go to [place] at the end of July,. \par Medications reviewed and updated.\par \par \par \par \par \par

## 2019-05-21 LAB
25(OH)D3 SERPL-MCNC: 13.5 NG/ML
ALBUMIN SERPL ELPH-MCNC: 4.6 G/DL
ALP BLD-CCNC: 70 U/L
ALT SERPL-CCNC: 16 U/L
ANION GAP SERPL CALC-SCNC: 12 MMOL/L
APPEARANCE: CLEAR
AST SERPL-CCNC: 16 U/L
BACTERIA: NEGATIVE
BASOPHILS # BLD AUTO: 0.05 K/UL
BASOPHILS NFR BLD AUTO: 0.5 %
BILIRUB SERPL-MCNC: 0.8 MG/DL
BILIRUBIN URINE: NEGATIVE
BKV DNA SPEC QL NAA+PROBE: NOT DETECTED COPIES/ML
BLOOD URINE: NORMAL
BUN SERPL-MCNC: 22 MG/DL
CALCIUM SERPL-MCNC: 10.6 MG/DL
CHLORIDE SERPL-SCNC: 98 MMOL/L
CHOLEST SERPL-MCNC: 195 MG/DL
CHOLEST/HDLC SERPL: 4 RATIO
CO2 SERPL-SCNC: 24 MMOL/L
COLOR: NORMAL
CREAT SERPL-MCNC: 1.45 MG/DL
CREAT SPEC-SCNC: 67 MG/DL
CREAT/PROT UR: 2.5 RATIO
EOSINOPHIL # BLD AUTO: 1.34 K/UL
EOSINOPHIL NFR BLD AUTO: 14.4 %
ESTIMATED AVERAGE GLUCOSE: 160 MG/DL
GLUCOSE QUALITATIVE U: ABNORMAL
GLUCOSE SERPL-MCNC: 230 MG/DL
HBA1C MFR BLD HPLC: 7.2 %
HCT VFR BLD CALC: 50.3 %
HDLC SERPL-MCNC: 49 MG/DL
HGB BLD-MCNC: 15.1 G/DL
HYALINE CASTS: 0 /LPF
IMM GRANULOCYTES NFR BLD AUTO: 0.3 %
KETONES URINE: NEGATIVE
LDH SERPL-CCNC: 205 U/L
LDLC SERPL CALC-MCNC: 100 MG/DL
LEUKOCYTE ESTERASE URINE: NEGATIVE
LYMPHOCYTES # BLD AUTO: 2.15 K/UL
LYMPHOCYTES NFR BLD AUTO: 23.1 %
MAGNESIUM SERPL-MCNC: 1.8 MG/DL
MAN DIFF?: NORMAL
MCHC RBC-ENTMCNC: 26.4 PG
MCHC RBC-ENTMCNC: 30 GM/DL
MCV RBC AUTO: 87.8 FL
MICROSCOPIC-UA: NORMAL
MONOCYTES # BLD AUTO: 0.84 K/UL
MONOCYTES NFR BLD AUTO: 9 %
NEUTROPHILS # BLD AUTO: 4.88 K/UL
NEUTROPHILS NFR BLD AUTO: 52.7 %
NITRITE URINE: NEGATIVE
PH URINE: 6
PHOSPHATE SERPL-MCNC: 3 MG/DL
PLATELET # BLD AUTO: 155 K/UL
POTASSIUM SERPL-SCNC: 4.7 MMOL/L
PROT SERPL-MCNC: 8 G/DL
PROT UR-MCNC: 171 MG/DL
PROTEIN URINE: ABNORMAL
RBC # BLD: 5.73 M/UL
RBC # FLD: 15.8 %
RED BLOOD CELLS URINE: 1 /HPF
SODIUM SERPL-SCNC: 134 MMOL/L
SPECIFIC GRAVITY URINE: 1.02
SQUAMOUS EPITHELIAL CELLS: 1 /HPF
TACROLIMUS SERPL-MCNC: 3.9 NG/ML
TRIGL SERPL-MCNC: 232 MG/DL
URATE SERPL-MCNC: 7.3 MG/DL
UROBILINOGEN URINE: NORMAL
WBC # FLD AUTO: 9.29 K/UL
WHITE BLOOD CELLS URINE: 1 /HPF

## 2019-05-22 ENCOUNTER — NON-APPOINTMENT (OUTPATIENT)
Age: 54
End: 2019-05-22

## 2019-05-22 ENCOUNTER — APPOINTMENT (OUTPATIENT)
Dept: ELECTROPHYSIOLOGY | Facility: CLINIC | Age: 54
End: 2019-05-22
Payer: MEDICAID

## 2019-05-22 ENCOUNTER — APPOINTMENT (OUTPATIENT)
Dept: CARDIOLOGY | Facility: CLINIC | Age: 54
End: 2019-05-22
Payer: MEDICAID

## 2019-05-22 VITALS
BODY MASS INDEX: 29.51 KG/M2 | SYSTOLIC BLOOD PRESSURE: 183 MMHG | HEIGHT: 67 IN | DIASTOLIC BLOOD PRESSURE: 91 MMHG | OXYGEN SATURATION: 97 % | HEART RATE: 60 BPM | WEIGHT: 188 LBS

## 2019-05-22 LAB — CMV DNA SPEC QL NAA+PROBE: NOT DETECTED

## 2019-05-22 PROCEDURE — 93280 PM DEVICE PROGR EVAL DUAL: CPT

## 2019-05-22 PROCEDURE — 93000 ELECTROCARDIOGRAM COMPLETE: CPT

## 2019-05-22 PROCEDURE — 99214 OFFICE O/P EST MOD 30 MIN: CPT

## 2019-05-23 ENCOUNTER — APPOINTMENT (OUTPATIENT)
Dept: ELECTROPHYSIOLOGY | Facility: CLINIC | Age: 54
End: 2019-05-23
Payer: MEDICAID

## 2019-05-23 NOTE — HISTORY OF PRESENT ILLNESS
[FreeTextEntry1] : Doing okay. Denies shortness of breath or palpitations. Recently had a fall in the bathtub and hit his chest. Afterwards he had some chest pain. Had left foot issues after stepping on a screw and it got infected while he was in [place].

## 2019-05-23 NOTE — PHYSICAL EXAM
[General Appearance - Well Developed] : well developed [Normal Appearance] : normal appearance [Well Groomed] : well groomed [General Appearance - Well Nourished] : well nourished [No Deformities] : no deformities [General Appearance - In No Acute Distress] : no acute distress [Normal Conjunctiva] : the conjunctiva exhibited no abnormalities [Eyelids - No Xanthelasma] : the eyelids demonstrated no xanthelasmas [Normal Oral Mucosa] : normal oral mucosa [No Oral Pallor] : no oral pallor [No Oral Cyanosis] : no oral cyanosis [Respiration, Rhythm And Depth] : normal respiratory rhythm and effort [Exaggerated Use Of Accessory Muscles For Inspiration] : no accessory muscle use [Auscultation Breath Sounds / Voice Sounds] : lungs were clear to auscultation bilaterally [Heart Rate And Rhythm] : heart rate and rhythm were normal [Heart Sounds] : normal S1 and S2 [Murmurs] : no murmurs present [Edema] : no peripheral edema present [Abdomen Soft] : soft [Abdomen Tenderness] : non-tender [Abnormal Walk] : normal gait [Cyanosis, Localized] : no localized cyanosis [Skin Color & Pigmentation] : normal skin color and pigmentation [] : no rash [No Venous Stasis] : no venous stasis [Skin Lesions] : no skin lesions [No Skin Ulcers] : no skin ulcer [No Xanthoma] : no  xanthoma was observed [Oriented To Time, Place, And Person] : oriented to person, place, and time [FreeTextEntry1] : left forearm fistula

## 2019-05-23 NOTE — DISCUSSION/SUMMARY
[Cardiomyopathy] : cardiomyopathy [Coronary Artery Disease] : coronary artery disease [Stable] : stable [Hypertension] : hypertension [FreeTextEntry1] : \par Currently stable from a cardiovascular standpoint. Hypertensive today. Euvolemic. Stable CAD. No ischemic or CHF symptoms. Patient with pacemaker for sinus node dysfunction. Continue current medications. ECG completed today and reviewed (findings as noted above). Follow up in 4 months. Will plan to update cardiac testing after our next visit.

## 2019-05-31 LAB
GLUCOSE BLDC GLUCOMTR-MCNC: 244
HBA1C MFR BLD HPLC: 4.7

## 2019-07-11 ENCOUNTER — RX RENEWAL (OUTPATIENT)
Age: 54
End: 2019-07-11

## 2019-07-16 ENCOUNTER — APPOINTMENT (OUTPATIENT)
Dept: INTERNAL MEDICINE | Facility: CLINIC | Age: 54
End: 2019-07-16
Payer: MEDICAID

## 2019-07-16 VITALS
HEART RATE: 60 BPM | BODY MASS INDEX: 28.25 KG/M2 | DIASTOLIC BLOOD PRESSURE: 90 MMHG | OXYGEN SATURATION: 98 % | SYSTOLIC BLOOD PRESSURE: 170 MMHG | WEIGHT: 180 LBS | TEMPERATURE: 98 F | HEIGHT: 67 IN

## 2019-07-16 PROCEDURE — 99396 PREV VISIT EST AGE 40-64: CPT

## 2019-07-26 NOTE — HISTORY OF PRESENT ILLNESS
[FreeTextEntry1] : cpe [de-identified] : 55yo M with kidney transplant who comes in for cpe\par \par #issue with pharmacy and where medications are sent despite kidney doctor sending meds for 1 year \par \par # Feels in good health - traveling again soon\par \par denies cp, sob, n/v\par Remainder of ROS reviewed and found to be negative.\par

## 2019-07-26 NOTE — ASSESSMENT
[FreeTextEntry1] : 53yo M with kidney transplant her efor cpe\par \par CPE today\par Labs today - nonfasting\par pneumonia shot with Dr. Atkinson per pt within last 5 years\par \par check HIV, Hep C\par consider checking Discussed PSA\par \par

## 2019-07-26 NOTE — PHYSICAL EXAM
[de-identified] : Gen: Adult, NAD\par Head: NC/AT\par EENT: ears grossly normal, PERRL, EOMI, moist mucosa\par Neck: supple\par Chest wall: nontender\par CV: normal s1 +s2, rrr, no murmurs\par Pulm: CTA-B\par Abd: soft, NT, ND\par Skin: no rashes\par Back: no CVA tenderness, no spinal tenderness\par Neuro: gait normal, AAOx3\par Psych: normal affect, normal interaction\par  [de-identified] : + thrill over L a-v fistula

## 2019-08-27 ENCOUNTER — APPOINTMENT (OUTPATIENT)
Dept: ELECTROPHYSIOLOGY | Facility: CLINIC | Age: 54
End: 2019-08-27

## 2019-09-19 ENCOUNTER — RX RENEWAL (OUTPATIENT)
Age: 54
End: 2019-09-19

## 2019-11-18 ENCOUNTER — APPOINTMENT (OUTPATIENT)
Dept: NEPHROLOGY | Facility: CLINIC | Age: 54
End: 2019-11-18
Payer: MEDICAID

## 2019-11-18 VITALS
HEIGHT: 67 IN | DIASTOLIC BLOOD PRESSURE: 81 MMHG | RESPIRATION RATE: 16 BRPM | SYSTOLIC BLOOD PRESSURE: 179 MMHG | TEMPERATURE: 98.1 F | OXYGEN SATURATION: 96 % | WEIGHT: 180 LBS | BODY MASS INDEX: 28.25 KG/M2

## 2019-11-18 PROCEDURE — 99214 OFFICE O/P EST MOD 30 MIN: CPT

## 2019-11-18 RX ORDER — ERGOCALCIFEROL 1.25 MG/1
1.25 MG CAPSULE ORAL
Qty: 4 | Refills: 0 | Status: DISCONTINUED | COMMUNITY
Start: 2019-01-28 | End: 2019-11-18

## 2019-11-18 NOTE — PHYSICAL EXAM
[General Appearance - In No Acute Distress] : in no acute distress [General Appearance - Well Nourished] : well nourished [General Appearance - Alert] : alert [General Appearance - Well Developed] : well developed [Extraocular Movements] : extraocular movements were intact [PERRL With Normal Accommodation] : pupils were equal in size, round, and reactive to light [Sclera] : the sclera and conjunctiva were normal [Examination Of The Oral Cavity] : the lips and gums were normal [Outer Ear] : the ears and nose were normal in appearance [Neck Appearance] : the appearance of the neck was normal [Neck Cervical Mass (___cm)] : no neck mass was observed [Jugular Venous Distention Increased] : there was no jugular-venous distention [Respiration, Rhythm And Depth] : normal respiratory rhythm and effort [Exaggerated Use Of Accessory Muscles For Inspiration] : no accessory muscle use [Auscultation Breath Sounds / Voice Sounds] : lungs were clear to auscultation bilaterally [Heart Rate And Rhythm] : heart rate was normal and rhythm regular [Heart Sounds] : normal S1 and S2 [Heart Sounds Pericardial Friction Rub] : no pericardial rub [Heart Sounds Gallop] : no gallops [Edema] : there was no peripheral edema [Abdomen Soft] : soft [Bowel Sounds] : normal bowel sounds [Abdomen Tenderness] : non-tender [Abdomen Mass (___ Cm)] : no abdominal mass palpated [Cervical Lymph Nodes Enlarged Posterior Bilaterally] : posterior cervical [Cervical Lymph Nodes Enlarged Anterior Bilaterally] : anterior cervical [Supraclavicular Lymph Nodes Enlarged Bilaterally] : supraclavicular [Axillary Lymph Nodes Enlarged Bilaterally] : axillary [Femoral Lymph Nodes Enlarged Bilaterally] : femoral [Inguinal Lymph Nodes Enlarged Bilaterally] : inguinal [Nail Clubbing] : no clubbing  or cyanosis of the fingernails [Abnormal Walk] : normal gait [Musculoskeletal - Swelling] : no joint swelling seen [Bruit] : a bruit was present [___ (cm) Fistula] : [unfilled] (cm) fistula [Thrill] : a thrill was present [] : no rash [Skin Turgor] : normal skin turgor [Skin Color & Pigmentation] : normal skin color and pigmentation [Cranial Nerves] : cranial nerves 2-12 were intact [FreeTextEntry1] : Left foot esion has healed fully. [Motor Exam] : the motor exam was normal [Sensation] : the sensory exam was normal to light touch and pinprick [No Focal Deficits] : no focal deficits [Oriented To Time, Place, And Person] : oriented to person, place, and time [Impaired Insight] : insight and judgment were intact [Affect] : the affect was normal [Mood] : the mood was normal

## 2019-11-18 NOTE — ASSESSMENT
[FreeTextEntry1] : 54 year old M DDRT 2/3/14; seen on follow up for kidney transplant \par \par DDRT 2/2/14 - currently doing well. will check labs. Reports his HbA1C was very well controlled.\par \par Foot wound: healing. He will follow with podiatry.\par \par DM- AM glucose 120 today. Discussed target level. Advised endocrinology follow up.\par \par HTN-  Reviewed. Discussed target level.\par \par CAD; continue current management. Has pacemaker, Has scheduled  f/u with Dr. Ron.\par \par Health Maintenance: Derm exam, and Eye check  advised  yearly. Has appointment for ophthalmology\par \par Advised follow up every 6 months. Completed 5 years post transplant.\par \par Discussed Renal Preservation strategies including achieving optimal weight through calory restriction and regular exercise, daily exercise, sleep hygiene, optimized control of glucose, blood pressure, lipids, avoidance of NSAIDs, Low Na and Low animal protein diet and medication adherence.\par \par \par Flu vaccine today.\par
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
## 2019-11-19 LAB
25(OH)D3 SERPL-MCNC: 28 NG/ML
ALBUMIN SERPL ELPH-MCNC: 4.8 G/DL
ALP BLD-CCNC: 61 U/L
ALT SERPL-CCNC: 18 U/L
ANION GAP SERPL CALC-SCNC: 16 MMOL/L
APPEARANCE: CLEAR
AST SERPL-CCNC: 15 U/L
BACTERIA: NEGATIVE
BASOPHILS # BLD AUTO: 0.06 K/UL
BASOPHILS NFR BLD AUTO: 0.6 %
BILIRUB SERPL-MCNC: 0.8 MG/DL
BILIRUBIN URINE: NEGATIVE
BKV DNA SPEC QL NAA+PROBE: NOT DETECTED COPIES/ML
BLOOD URINE: NEGATIVE
BUN SERPL-MCNC: 42 MG/DL
CALCIUM SERPL-MCNC: 10.6 MG/DL
CALCIUM SERPL-MCNC: 10.6 MG/DL
CHLORIDE SERPL-SCNC: 97 MMOL/L
CHOLEST SERPL-MCNC: 190 MG/DL
CHOLEST/HDLC SERPL: 4.8 RATIO
CMV DNA SPEC QL NAA+PROBE: NOT DETECTED
CMVPCR LOG: NOT DETECTED LOGIU/ML
CO2 SERPL-SCNC: 21 MMOL/L
COLOR: YELLOW
CREAT SERPL-MCNC: 1.84 MG/DL
CREAT SPEC-SCNC: 107 MG/DL
CREAT/PROT UR: 1.3 RATIO
EOSINOPHIL # BLD AUTO: 0.75 K/UL
EOSINOPHIL NFR BLD AUTO: 7.4 %
ESTIMATED AVERAGE GLUCOSE: 189 MG/DL
GLUCOSE QUALITATIVE U: ABNORMAL
GLUCOSE SERPL-MCNC: 386 MG/DL
HBA1C MFR BLD HPLC: 8.2 %
HCT VFR BLD CALC: 53.5 %
HDLC SERPL-MCNC: 40 MG/DL
HGB BLD-MCNC: 16.4 G/DL
HYALINE CASTS: 0 /LPF
IMM GRANULOCYTES NFR BLD AUTO: 0.6 %
KETONES URINE: NEGATIVE
LDH SERPL-CCNC: 214 U/L
LDLC SERPL CALC-MCNC: 91 MG/DL
LEUKOCYTE ESTERASE URINE: NEGATIVE
LYMPHOCYTES # BLD AUTO: 2.56 K/UL
LYMPHOCYTES NFR BLD AUTO: 25.2 %
MAGNESIUM SERPL-MCNC: 1.9 MG/DL
MAN DIFF?: NORMAL
MCHC RBC-ENTMCNC: 27.2 PG
MCHC RBC-ENTMCNC: 30.7 GM/DL
MCV RBC AUTO: 88.7 FL
MICROSCOPIC-UA: NORMAL
MONOCYTES # BLD AUTO: 0.97 K/UL
MONOCYTES NFR BLD AUTO: 9.6 %
NEUTROPHILS # BLD AUTO: 5.75 K/UL
NEUTROPHILS NFR BLD AUTO: 56.6 %
NITRITE URINE: NEGATIVE
PARATHYROID HORMONE INTACT: 55 PG/ML
PH URINE: 6
PHOSPHATE SERPL-MCNC: 3.3 MG/DL
PLATELET # BLD AUTO: 164 K/UL
POTASSIUM SERPL-SCNC: 4.8 MMOL/L
PROT SERPL-MCNC: 8.1 G/DL
PROT UR-MCNC: 143 MG/DL
PROTEIN URINE: ABNORMAL
RBC # BLD: 6.03 M/UL
RBC # FLD: 14.2 %
RED BLOOD CELLS URINE: 2 /HPF
SODIUM SERPL-SCNC: 134 MMOL/L
SPECIFIC GRAVITY URINE: 1.02
SQUAMOUS EPITHELIAL CELLS: 2 /HPF
TACROLIMUS SERPL-MCNC: 5.9 NG/ML
TRIGL SERPL-MCNC: 296 MG/DL
URATE SERPL-MCNC: 7.4 MG/DL
UROBILINOGEN URINE: NORMAL
WBC # FLD AUTO: 10.15 K/UL
WHITE BLOOD CELLS URINE: 1 /HPF

## 2019-11-26 ENCOUNTER — APPOINTMENT (OUTPATIENT)
Dept: INTERNAL MEDICINE | Facility: CLINIC | Age: 54
End: 2019-11-26
Payer: MEDICAID

## 2019-11-26 VITALS
HEART RATE: 60 BPM | HEIGHT: 67 IN | WEIGHT: 180 LBS | OXYGEN SATURATION: 97 % | BODY MASS INDEX: 28.25 KG/M2 | DIASTOLIC BLOOD PRESSURE: 80 MMHG | SYSTOLIC BLOOD PRESSURE: 142 MMHG | TEMPERATURE: 97.8 F

## 2019-11-26 DIAGNOSIS — N18.3 CHRONIC KIDNEY DISEASE, STAGE 3 (MODERATE): ICD-10-CM

## 2019-11-26 DIAGNOSIS — R29.898 OTHER SYMPTOMS AND SIGNS INVOLVING THE MUSCULOSKELETAL SYSTEM: ICD-10-CM

## 2019-11-26 PROCEDURE — 99214 OFFICE O/P EST MOD 30 MIN: CPT

## 2019-11-27 ENCOUNTER — APPOINTMENT (OUTPATIENT)
Dept: ELECTROPHYSIOLOGY | Facility: CLINIC | Age: 54
End: 2019-11-27
Payer: MEDICAID

## 2019-11-27 PROCEDURE — 93296 REM INTERROG EVL PM/IDS: CPT

## 2019-11-27 PROCEDURE — 93294 REM INTERROG EVL PM/LDLS PM: CPT

## 2019-11-29 NOTE — PHYSICAL EXAM
[Normal Oropharynx] : the oropharynx was normal [PERRL] : pupils equal round and reactive to light [EOMI] : extraocular movements intact [No Accessory Muscle Use] : no accessory muscle use [No Lymphadenopathy] : no lymphadenopathy [Regular Rhythm] : with a regular rhythm [Clear to Auscultation] : lungs were clear to auscultation bilaterally [Normal S1, S2] : normal S1 and S2 [No Rash] : no rash [Coordination Grossly Intact] : coordination grossly intact [Normal Insight/Judgement] : insight and judgment were intact [Normal Affect] : the affect was normal [de-identified] : adult M NAD [de-identified] : L A-V fistula thrill

## 2019-11-29 NOTE — ASSESSMENT
[FreeTextEntry1] : 53yo M with kidney transplant, T2DM here for follow up\par \par HTN - ocnt meds\par \par T2Dm -A1c of 8.2 this month - indiscretion while abroad - on insulin\par               seeing a new eye doctor\par \par CKD - on prograf and tacrolimus - levels checked recently\par \par LW weakness - already uses a cane, recent fall. Referred to PT for stength training\par \par \par HCM - flu shot already\par            labs already\par \par misc - needs transportation form updated

## 2019-11-29 NOTE — HISTORY OF PRESENT ILLNESS
[FreeTextEntry1] : T2DM\par Kidney transplant [de-identified] : 54 year M here for follow up. Last visit with me in 7/2019.\par SInce then has seen kidney doctor.\par He remains on antirejection meds - levels checked this motnh\par \par # reports increased weakness in both legs, had a fall a few weeks ago  - while going up one step - needed someone to come to help him.\par \par # returning to [place] late January -\par denies cp, sob, n/v.\par Remainder of ROS reviewed and found to be negative.\par

## 2019-12-02 ENCOUNTER — OTHER (OUTPATIENT)
Age: 54
End: 2019-12-02

## 2019-12-02 ENCOUNTER — APPOINTMENT (OUTPATIENT)
Dept: TRANSPLANT | Facility: CLINIC | Age: 54
End: 2019-12-02

## 2019-12-02 LAB
25(OH)D3 SERPL-MCNC: 20.8 NG/ML
ALBUMIN SERPL ELPH-MCNC: 4.4 G/DL
ALP BLD-CCNC: 58 U/L
ALT SERPL-CCNC: 9 U/L
ANION GAP SERPL CALC-SCNC: 15 MMOL/L
APPEARANCE: CLEAR
AST SERPL-CCNC: 12 U/L
BACTERIA: NEGATIVE
BASOPHILS # BLD AUTO: 0.04 K/UL
BASOPHILS NFR BLD AUTO: 0.4 %
BILIRUB SERPL-MCNC: 1.1 MG/DL
BILIRUBIN URINE: NEGATIVE
BLOOD URINE: NEGATIVE
BUN SERPL-MCNC: 25 MG/DL
CALCIUM SERPL-MCNC: 10.3 MG/DL
CALCIUM SERPL-MCNC: 10.3 MG/DL
CHLORIDE SERPL-SCNC: 96 MMOL/L
CHOLEST SERPL-MCNC: 214 MG/DL
CHOLEST/HDLC SERPL: 5 RATIO
CO2 SERPL-SCNC: 24 MMOL/L
COLOR: YELLOW
CREAT SERPL-MCNC: 1.46 MG/DL
CREAT SPEC-SCNC: 127 MG/DL
CREAT/PROT UR: 1.3 RATIO
EOSINOPHIL # BLD AUTO: 1.24 K/UL
EOSINOPHIL NFR BLD AUTO: 11.3 %
ESTIMATED AVERAGE GLUCOSE: 203 MG/DL
GLUCOSE QUALITATIVE U: ABNORMAL
GLUCOSE SERPL-MCNC: 296 MG/DL
HBA1C MFR BLD HPLC: 8.7 %
HCT VFR BLD CALC: 51.4 %
HDLC SERPL-MCNC: 43 MG/DL
HGB BLD-MCNC: 16 G/DL
HYALINE CASTS: 0 /LPF
IMM GRANULOCYTES NFR BLD AUTO: 0.3 %
KETONES URINE: NEGATIVE
LDH SERPL-CCNC: 196 U/L
LDLC SERPL CALC-MCNC: 100 MG/DL
LEUKOCYTE ESTERASE URINE: NEGATIVE
LYMPHOCYTES # BLD AUTO: 2.06 K/UL
LYMPHOCYTES NFR BLD AUTO: 18.7 %
MAGNESIUM SERPL-MCNC: 2 MG/DL
MAN DIFF?: NORMAL
MCHC RBC-ENTMCNC: 27.5 PG
MCHC RBC-ENTMCNC: 31.1 GM/DL
MCV RBC AUTO: 88.3 FL
MICROSCOPIC-UA: NORMAL
MONOCYTES # BLD AUTO: 0.98 K/UL
MONOCYTES NFR BLD AUTO: 8.9 %
NEUTROPHILS # BLD AUTO: 6.64 K/UL
NEUTROPHILS NFR BLD AUTO: 60.4 %
NITRITE URINE: NEGATIVE
PARATHYROID HORMONE INTACT: 53 PG/ML
PH URINE: 6
PHOSPHATE SERPL-MCNC: 2.6 MG/DL
PLATELET # BLD AUTO: 134 K/UL
POTASSIUM SERPL-SCNC: 4.7 MMOL/L
PROT SERPL-MCNC: 7.7 G/DL
PROT UR-MCNC: 162 MG/DL
PROTEIN URINE: ABNORMAL
RBC # BLD: 5.82 M/UL
RBC # FLD: 14.2 %
RED BLOOD CELLS URINE: NORMAL /HPF
SODIUM SERPL-SCNC: 135 MMOL/L
SPECIFIC GRAVITY URINE: >=1.03
SQUAMOUS EPITHELIAL CELLS: 5 /HPF
TACROLIMUS SERPL-MCNC: 5.2 NG/ML
TRIGL SERPL-MCNC: 357 MG/DL
URATE SERPL-MCNC: 7.2 MG/DL
URINE COMMENTS: NORMAL
UROBILINOGEN URINE: NORMAL
WBC # FLD AUTO: 10.99 K/UL
WHITE BLOOD CELLS URINE: NORMAL /HPF

## 2019-12-04 LAB
BKV DNA SPEC QL NAA+PROBE: NOT DETECTED COPIES/ML
CMV DNA SPEC QL NAA+PROBE: NOT DETECTED
CMVPCR LOG: NOT DETECTED LOGIU/ML

## 2019-12-19 ENCOUNTER — APPOINTMENT (OUTPATIENT)
Dept: CARDIOLOGY | Facility: CLINIC | Age: 54
End: 2019-12-19
Payer: MEDICAID

## 2019-12-19 ENCOUNTER — APPOINTMENT (OUTPATIENT)
Dept: ELECTROPHYSIOLOGY | Facility: CLINIC | Age: 54
End: 2019-12-19
Payer: MEDICAID

## 2019-12-19 ENCOUNTER — NON-APPOINTMENT (OUTPATIENT)
Age: 54
End: 2019-12-19

## 2019-12-19 VITALS
DIASTOLIC BLOOD PRESSURE: 99 MMHG | HEART RATE: 60 BPM | HEIGHT: 67 IN | RESPIRATION RATE: 14 BRPM | WEIGHT: 178 LBS | OXYGEN SATURATION: 97 % | BODY MASS INDEX: 27.94 KG/M2 | SYSTOLIC BLOOD PRESSURE: 191 MMHG

## 2019-12-19 DIAGNOSIS — Z86.79 PERSONAL HISTORY OF OTHER DISEASES OF THE CIRCULATORY SYSTEM: ICD-10-CM

## 2019-12-19 DIAGNOSIS — E83.42 HYPOMAGNESEMIA: ICD-10-CM

## 2019-12-19 DIAGNOSIS — R78.81 BACTEREMIA: ICD-10-CM

## 2019-12-19 DIAGNOSIS — I49.5 SICK SINUS SYNDROME: ICD-10-CM

## 2019-12-19 PROCEDURE — 93280 PM DEVICE PROGR EVAL DUAL: CPT

## 2019-12-19 PROCEDURE — 93000 ELECTROCARDIOGRAM COMPLETE: CPT | Mod: 59

## 2019-12-19 PROCEDURE — 99214 OFFICE O/P EST MOD 30 MIN: CPT | Mod: 25

## 2019-12-25 PROBLEM — R78.81 MRSA BACTEREMIA: Status: RESOLVED | Noted: 2018-07-06 | Resolved: 2019-12-25

## 2019-12-25 NOTE — HISTORY OF PRESENT ILLNESS
[FreeTextEntry1] : Doing okay. Denies chest pain, shortness of breath or palpitations. Device interrogation revealed episodes of paroxysmal atrial fibrillation. Right leg weakness noted. Patient using cane.

## 2019-12-25 NOTE — DISCUSSION/SUMMARY
[Paroxysmal Atrial Fibrillation] : paroxysmal atrial fibrillation [Cardiomyopathy] : cardiomyopathy [Compensated] : compensated [Coronary Artery Disease] : coronary artery disease [Stable] : stable [Hypertension] : hypertension [FreeTextEntry1] : \par Currently stable from a cardiovascular standpoint. Hypertensive today (BP usually in 140's mmHg systolic). History of ischemic cardiomyopathy. Currently euvolemic. Stable CAD. Patient with paroxysmal atrial fibrillation ([id number] score 3). Currently in sinus rhythm. Patient with renal transplant on immunosuppressive therapy. Will initiate Eliquis 5 mg twice daily. Patient may discontinue Plavix. Continue all other current medications. ECG completed today and reviewed. Follow up in 3 months.

## 2019-12-25 NOTE — PHYSICAL EXAM
[General Appearance - Well Developed] : well developed [Normal Appearance] : normal appearance [Well Groomed] : well groomed [General Appearance - Well Nourished] : well nourished [No Deformities] : no deformities [General Appearance - In No Acute Distress] : no acute distress [Normal Conjunctiva] : the conjunctiva exhibited no abnormalities [Eyelids - No Xanthelasma] : the eyelids demonstrated no xanthelasmas [Normal Oral Mucosa] : normal oral mucosa [No Oral Pallor] : no oral pallor [No Oral Cyanosis] : no oral cyanosis [Respiration, Rhythm And Depth] : normal respiratory rhythm and effort [Exaggerated Use Of Accessory Muscles For Inspiration] : no accessory muscle use [Auscultation Breath Sounds / Voice Sounds] : lungs were clear to auscultation bilaterally [Heart Rate And Rhythm] : heart rate and rhythm were normal [Murmurs] : no murmurs present [Heart Sounds] : normal S1 and S2 [Edema] : no peripheral edema present [Abdomen Soft] : soft [Abdomen Tenderness] : non-tender [Abnormal Walk] : normal gait [Cyanosis, Localized] : no localized cyanosis [] : no rash [Skin Color & Pigmentation] : normal skin color and pigmentation [No Skin Ulcers] : no skin ulcer [Skin Lesions] : no skin lesions [No Venous Stasis] : no venous stasis [No Xanthoma] : no  xanthoma was observed [Oriented To Time, Place, And Person] : oriented to person, place, and time [FreeTextEntry1] : left forearm fistula

## 2020-01-27 ENCOUNTER — APPOINTMENT (OUTPATIENT)
Dept: NEPHROLOGY | Facility: CLINIC | Age: 55
End: 2020-01-27

## 2020-02-18 ENCOUNTER — RX RENEWAL (OUTPATIENT)
Age: 55
End: 2020-02-18

## 2020-02-19 RX ORDER — BLOOD-GLUCOSE METER
KIT MISCELLANEOUS 3 TIMES DAILY
Qty: 1 | Refills: 4 | Status: ACTIVE | COMMUNITY
Start: 2017-12-21 | End: 1900-01-01

## 2020-03-20 ENCOUNTER — APPOINTMENT (OUTPATIENT)
Dept: ELECTROPHYSIOLOGY | Facility: CLINIC | Age: 55
End: 2020-03-20

## 2020-05-18 ENCOUNTER — APPOINTMENT (OUTPATIENT)
Dept: NEPHROLOGY | Facility: CLINIC | Age: 55
End: 2020-05-18
Payer: MEDICAID

## 2020-05-18 PROCEDURE — 99214 OFFICE O/P EST MOD 30 MIN: CPT

## 2020-05-18 NOTE — HISTORY OF PRESENT ILLNESS
[Home] : at home, [unfilled] , at the time of the visit. [Medical Office: ([facility])___] : at the medical office located in  [FreeTextEntry1] : This visit is provided by telehealth using real time 2 way audio visual technology. This patient is located at home and the provider is located at the [facility] medical office at [address] [place] [address]. Patient participated in this visit.\par Verbal consent for this visit was given by patient\par Total duration of this visit which included conversation, lab review, medication review and clinical assessment and advice lasted approximately 25-30 minutes.\par \par \par \par Currently:\par \par Patient feels ok, blood pressure 126/81 mm Hg. He reports he has enough medications. Will come to office when [place] opens up. Concerned about neuropathy symptoms.\par Reviewed for acute symptoms- hand and leg numbness and gen weakness.\par Taking precautions to prevent COVID exposure\par I reviewed current home  blood pressure and home glucose control and medications.\par Home blood pressure 126/81\par \par On Telehealth visit:\par \par Patient appears comfortable but upset about not receiving government monies as promised.\par No distress, not dyspneic\par Conscious, alert, oriented X 3\par Speech: Normal\par Other observations as noted\par Reviewed last lab data -none recent\par

## 2020-05-18 NOTE — ASSESSMENT
[FreeTextEntry1] : Clinical Impression:\par Kidney Transplant recipient, functioning allograft\par Immunosuppression- Reviewed and adjusted.\par DM \par HTN controlled\par Plan:\par Immunosuppression adjusted\par Continue current medications\par additional changes none made today.\par Labs and follow up visit to be scheduled as discussed.\par Discussed COVID infection prevention strategies including handwashing, social distancing and monitoring for any signs or symptoms.\par \par Plan: Office visit and labs in 6 weeks.\par \par

## 2020-06-22 ENCOUNTER — APPOINTMENT (OUTPATIENT)
Dept: ELECTROPHYSIOLOGY | Facility: CLINIC | Age: 55
End: 2020-06-22

## 2020-07-06 ENCOUNTER — APPOINTMENT (OUTPATIENT)
Dept: ELECTROPHYSIOLOGY | Facility: CLINIC | Age: 55
End: 2020-07-06
Payer: MEDICAID

## 2020-07-06 PROCEDURE — 93296 REM INTERROG EVL PM/IDS: CPT

## 2020-07-06 PROCEDURE — 93294 REM INTERROG EVL PM/LDLS PM: CPT

## 2020-08-07 ENCOUNTER — APPOINTMENT (OUTPATIENT)
Dept: CARDIOLOGY | Facility: CLINIC | Age: 55
End: 2020-08-07
Payer: MEDICAID

## 2020-08-07 ENCOUNTER — NON-APPOINTMENT (OUTPATIENT)
Age: 55
End: 2020-08-07

## 2020-08-07 VITALS — TEMPERATURE: 97.3 F

## 2020-08-07 VITALS — DIASTOLIC BLOOD PRESSURE: 108 MMHG | SYSTOLIC BLOOD PRESSURE: 199 MMHG

## 2020-08-07 VITALS
BODY MASS INDEX: 24.17 KG/M2 | WEIGHT: 154 LBS | SYSTOLIC BLOOD PRESSURE: 217 MMHG | HEART RATE: 60 BPM | OXYGEN SATURATION: 98 % | DIASTOLIC BLOOD PRESSURE: 102 MMHG | HEIGHT: 67 IN

## 2020-08-07 DIAGNOSIS — I48.0 PAROXYSMAL ATRIAL FIBRILLATION: ICD-10-CM

## 2020-08-07 PROCEDURE — 93000 ELECTROCARDIOGRAM COMPLETE: CPT

## 2020-08-07 PROCEDURE — 99214 OFFICE O/P EST MOD 30 MIN: CPT

## 2020-08-07 NOTE — HISTORY OF PRESENT ILLNESS
[FreeTextEntry1] : Doing okay but complains of difficulty walking. Has balance issues and pains in both feet. Denies chest pain, shortness of breath or palpitations. Admits to eating a lot of salty fish.

## 2020-08-07 NOTE — PHYSICAL EXAM
[General Appearance - Well Developed] : well developed [Well Groomed] : well groomed [General Appearance - Well Nourished] : well nourished [Normal Appearance] : normal appearance [No Deformities] : no deformities [Normal Conjunctiva] : the conjunctiva exhibited no abnormalities [General Appearance - In No Acute Distress] : no acute distress [Eyelids - No Xanthelasma] : the eyelids demonstrated no xanthelasmas [Normal Oral Mucosa] : normal oral mucosa [No Oral Cyanosis] : no oral cyanosis [No Oral Pallor] : no oral pallor [Respiration, Rhythm And Depth] : normal respiratory rhythm and effort [Exaggerated Use Of Accessory Muscles For Inspiration] : no accessory muscle use [Auscultation Breath Sounds / Voice Sounds] : lungs were clear to auscultation bilaterally [Heart Sounds] : normal S1 and S2 [Heart Rate And Rhythm] : heart rate and rhythm were normal [Murmurs] : no murmurs present [Edema] : no peripheral edema present [Abdomen Soft] : soft [Abdomen Tenderness] : non-tender [Cyanosis, Localized] : no localized cyanosis [Skin Color & Pigmentation] : normal skin color and pigmentation [] : no rash [No Venous Stasis] : no venous stasis [Skin Lesions] : no skin lesions [No Skin Ulcers] : no skin ulcer [No Xanthoma] : no  xanthoma was observed [Oriented To Time, Place, And Person] : oriented to person, place, and time [FreeTextEntry1] : left forearm fistula

## 2020-08-07 NOTE — DISCUSSION/SUMMARY
[Paroxysmal Atrial Fibrillation] : paroxysmal atrial fibrillation [Compensated] : compensated [Coronary Artery Disease] : coronary artery disease [Hypertension] : hypertension [Cardiomyopathy] : cardiomyopathy [Stable] : stable [FreeTextEntry1] : \par Currently stable from a cardiovascular standpoint. Hypertensive (has not taken any medications yet). History of ischemic cardiomyopathy (LVEF 40-45%). Appears euvolemic. Stable CAD. No ischemic or CHF symptoms. Paroxysmal atrial fibrillation. Currently atrial paced. Will increase losartan to 100 mg daily for BP control (advised patient to take as soon as he gets home). Continue all other current medications. ECG completed today and reviewed (findings as noted above). Follow up in 3 months. Patient has follow up next week with nephrology. Recommended neurology evaluation for difficulty walking given history of peripheral neuropathy.

## 2020-08-13 ENCOUNTER — APPOINTMENT (OUTPATIENT)
Dept: NEPHROLOGY | Facility: CLINIC | Age: 55
End: 2020-08-13
Payer: MEDICAID

## 2020-08-13 VITALS
TEMPERATURE: 97.9 F | OXYGEN SATURATION: 92 % | HEIGHT: 67 IN | WEIGHT: 169 LBS | BODY MASS INDEX: 26.53 KG/M2 | DIASTOLIC BLOOD PRESSURE: 84 MMHG | RESPIRATION RATE: 14 BRPM | SYSTOLIC BLOOD PRESSURE: 196 MMHG | HEART RATE: 86 BPM

## 2020-08-13 VITALS — DIASTOLIC BLOOD PRESSURE: 80 MMHG | SYSTOLIC BLOOD PRESSURE: 130 MMHG

## 2020-08-13 DIAGNOSIS — Z94.0 KIDNEY TRANSPLANT STATUS: ICD-10-CM

## 2020-08-13 DIAGNOSIS — D89.9 DISORDER INVOLVING THE IMMUNE MECHANISM, UNSPECIFIED: ICD-10-CM

## 2020-08-13 PROCEDURE — 99214 OFFICE O/P EST MOD 30 MIN: CPT

## 2020-08-13 RX ORDER — BLOOD-GLUCOSE METER
EACH MISCELLANEOUS
Qty: 1 | Refills: 0 | Status: ACTIVE | COMMUNITY
Start: 2017-04-11 | End: 1900-01-01

## 2020-08-13 NOTE — HISTORY OF PRESENT ILLNESS
[FreeTextEntry1] : 55 year old male with hx of DM, CAD s/ PCI, PPM,  DDRT 2/3/14 at [facility] on Prograf and Myfortic. \par He has been back from [place] since July 3rd 2020. Has been feeling sick, tired. Glucose level has been not followed. He has not seen endocrinologist and does not want to. Has not seen primary MD. Trying to get medicaid.\par \par Currently: \par Has urinary symptoms s/o prostatism. No fever. \par Lives with his wife.\par Medications reviewed and updated.\par He is feeling weak and has difficulty walking and feels his hands are getting weak.\par \par \par \par \par \par \par

## 2020-08-13 NOTE — PHYSICAL EXAM
[General Appearance - Alert] : alert [General Appearance - In No Acute Distress] : in no acute distress [General Appearance - Well Nourished] : well nourished [General Appearance - Well Developed] : well developed [Extraocular Movements] : extraocular movements were intact [PERRL With Normal Accommodation] : pupils were equal in size, round, and reactive to light [Sclera] : the sclera and conjunctiva were normal [Examination Of The Oral Cavity] : the lips and gums were normal [Outer Ear] : the ears and nose were normal in appearance [Neck Appearance] : the appearance of the neck was normal [Neck Cervical Mass (___cm)] : no neck mass was observed [Jugular Venous Distention Increased] : there was no jugular-venous distention [Respiration, Rhythm And Depth] : normal respiratory rhythm and effort [Exaggerated Use Of Accessory Muscles For Inspiration] : no accessory muscle use [Auscultation Breath Sounds / Voice Sounds] : lungs were clear to auscultation bilaterally [Heart Rate And Rhythm] : heart rate was normal and rhythm regular [Heart Sounds] : normal S1 and S2 [Heart Sounds Pericardial Friction Rub] : no pericardial rub [Heart Sounds Gallop] : no gallops [Bowel Sounds] : normal bowel sounds [Edema] : there was no peripheral edema [Abdomen Tenderness] : non-tender [Abdomen Soft] : soft [Abdomen Mass (___ Cm)] : no abdominal mass palpated [Cervical Lymph Nodes Enlarged Posterior Bilaterally] : posterior cervical [Axillary Lymph Nodes Enlarged Bilaterally] : axillary [Cervical Lymph Nodes Enlarged Anterior Bilaterally] : anterior cervical [Supraclavicular Lymph Nodes Enlarged Bilaterally] : supraclavicular [Inguinal Lymph Nodes Enlarged Bilaterally] : inguinal [Femoral Lymph Nodes Enlarged Bilaterally] : femoral [Abnormal Walk] : normal gait [Nail Clubbing] : no clubbing  or cyanosis of the fingernails [Bruit] : a bruit was present [Musculoskeletal - Swelling] : no joint swelling seen [___ (cm) Fistula] : [unfilled] (cm) fistula [Thrill] : a thrill was present [] : no rash [Skin Color & Pigmentation] : normal skin color and pigmentation [Skin Turgor] : normal skin turgor [Cranial Nerves] : cranial nerves 2-12 were intact [Sensation] : the sensory exam was normal to light touch and pinprick [Motor Exam] : the motor exam was normal [No Focal Deficits] : no focal deficits [Affect] : the affect was normal [Impaired Insight] : insight and judgment were intact [Oriented To Time, Place, And Person] : oriented to person, place, and time [Mood] : the mood was normal [FreeTextEntry1] : Left foot esion has healed fully.

## 2020-08-13 NOTE — ASSESSMENT
[FreeTextEntry1] : 54 year old M DDRT 2/3/14; seen on follow up for kidney transplant \par \par DDRT 2/2/14 - Has not had lab check since Dec 2019. Today will check labs. \par \par \par DM- Currently main issue appears to be his diabetes control. Explained this to him. Not checking glucose levels. Discussed target level. Advised endocrinology follow up.\par \par HTN-  Reviewed. Discussed target level. POorly controlled.\par \par CAD; continue current management. Has pacemaker, Has scheduled  f/u with Dr. Ron.\par \par Health Maintenance: Derm exam, and Eye check  advised  yearly. Has appointment for ophthalmology\par \par Advised follow up every 6 months. Completed 5 years post transplant.\par \par Discussed again Renal Preservation strategies including achieving optimal weight through calory restriction and regular exercise, daily exercise, sleep hygiene, optimized control of glucose, blood pressure, lipids, avoidance of NSAIDs, Low Na and Low animal protein diet and medication adherence.\par \par Discussed with patient regarding possible hospital evaluation since glucose control is unclear. He feels he wants to try managing at home. I have sent glucometer with testing supplies to his pharmacy.\par Will arrange for  to speak to him today regarding his medicaid application.\par

## 2020-08-17 LAB
25(OH)D3 SERPL-MCNC: 22.6 NG/ML
ALBUMIN SERPL ELPH-MCNC: 4.5 G/DL
ALP BLD-CCNC: 68 U/L
ALT SERPL-CCNC: 9 U/L
ANION GAP SERPL CALC-SCNC: 15 MMOL/L
APPEARANCE: CLEAR
AST SERPL-CCNC: 12 U/L
BACTERIA: NEGATIVE
BASOPHILS # BLD AUTO: 0.02 K/UL
BASOPHILS NFR BLD AUTO: 0.3 %
BILIRUB SERPL-MCNC: 0.9 MG/DL
BILIRUBIN URINE: NEGATIVE
BKV DNA SPEC QL NAA+PROBE: NOT DETECTED COPIES/ML
BLOOD URINE: NEGATIVE
BUN SERPL-MCNC: 21 MG/DL
CALCIUM SERPL-MCNC: 10.5 MG/DL
CALCIUM SERPL-MCNC: 10.5 MG/DL
CHLORIDE SERPL-SCNC: 99 MMOL/L
CHOLEST SERPL-MCNC: 182 MG/DL
CHOLEST/HDLC SERPL: 4.8 RATIO
CMV DNA SPEC QL NAA+PROBE: 137 IU/ML
CO2 SERPL-SCNC: 21 MMOL/L
COLOR: NORMAL
CREAT SERPL-MCNC: 1.34 MG/DL
CREAT SPEC-SCNC: 59 MG/DL
CREAT/PROT UR: 1.8 RATIO
EOSINOPHIL # BLD AUTO: 0.63 K/UL
EOSINOPHIL NFR BLD AUTO: 9.1 %
GLUCOSE QUALITATIVE U: ABNORMAL
GLUCOSE SERPL-MCNC: 362 MG/DL
HCT VFR BLD CALC: 46 %
HDLC SERPL-MCNC: 38 MG/DL
HGB BLD-MCNC: 14.1 G/DL
HYALINE CASTS: 0 /LPF
IMM GRANULOCYTES NFR BLD AUTO: 0.3 %
KETONES URINE: NEGATIVE
LDH SERPL-CCNC: 224 U/L
LDLC SERPL CALC-MCNC: 93 MG/DL
LEUKOCYTE ESTERASE URINE: NEGATIVE
LYMPHOCYTES # BLD AUTO: 1.68 K/UL
LYMPHOCYTES NFR BLD AUTO: 24.3 %
MAGNESIUM SERPL-MCNC: 1.7 MG/DL
MAN DIFF?: NORMAL
MCHC RBC-ENTMCNC: 26.1 PG
MCHC RBC-ENTMCNC: 30.7 GM/DL
MCV RBC AUTO: 85 FL
MICROSCOPIC-UA: NORMAL
MONOCYTES # BLD AUTO: 0.67 K/UL
MONOCYTES NFR BLD AUTO: 9.7 %
NEUTROPHILS # BLD AUTO: 3.9 K/UL
NEUTROPHILS NFR BLD AUTO: 56.3 %
NITRITE URINE: NEGATIVE
PARATHYROID HORMONE INTACT: 72 PG/ML
PH URINE: 6
PHOSPHATE SERPL-MCNC: 2.8 MG/DL
PLATELET # BLD AUTO: 127 K/UL
POTASSIUM SERPL-SCNC: 4.5 MMOL/L
PROT SERPL-MCNC: 8.3 G/DL
PROT UR-MCNC: 109 MG/DL
PROTEIN URINE: ABNORMAL
RBC # BLD: 5.41 M/UL
RBC # FLD: 15.5 %
RED BLOOD CELLS URINE: 1 /HPF
SODIUM SERPL-SCNC: 135 MMOL/L
SPECIFIC GRAVITY URINE: 1.02
SQUAMOUS EPITHELIAL CELLS: 1 /HPF
TACROLIMUS SERPL-MCNC: 5.4 NG/ML
TRIGL SERPL-MCNC: 254 MG/DL
URATE SERPL-MCNC: 4.9 MG/DL
UROBILINOGEN URINE: NORMAL
WBC # FLD AUTO: 6.92 K/UL
WHITE BLOOD CELLS URINE: 1 /HPF

## 2020-09-01 ENCOUNTER — APPOINTMENT (OUTPATIENT)
Dept: INTERNAL MEDICINE | Facility: CLINIC | Age: 55
End: 2020-09-01
Payer: MEDICAID

## 2020-09-01 VITALS
TEMPERATURE: 98 F | RESPIRATION RATE: 16 BRPM | OXYGEN SATURATION: 97 % | HEART RATE: 61 BPM | DIASTOLIC BLOOD PRESSURE: 86 MMHG | SYSTOLIC BLOOD PRESSURE: 132 MMHG

## 2020-09-01 DIAGNOSIS — E11.9 TYPE 2 DIABETES MELLITUS W/OUT COMPLICATIONS: ICD-10-CM

## 2020-09-01 DIAGNOSIS — R29.898 OTHER SYMPTOMS AND SIGNS INVOLVING THE MUSCULOSKELETAL SYSTEM: ICD-10-CM

## 2020-09-01 DIAGNOSIS — Z00.00 ENCOUNTER FOR GENERAL ADULT MEDICAL EXAMINATION W/OUT ABNORMAL FINDINGS: ICD-10-CM

## 2020-09-01 PROCEDURE — 90686 IIV4 VACC NO PRSV 0.5 ML IM: CPT

## 2020-09-01 PROCEDURE — 99214 OFFICE O/P EST MOD 30 MIN: CPT | Mod: 25

## 2020-09-01 PROCEDURE — G0008: CPT

## 2020-09-01 PROCEDURE — 99396 PREV VISIT EST AGE 40-64: CPT | Mod: 25

## 2020-09-01 NOTE — ASSESSMENT
[FreeTextEntry1] : 54yo M with PMH of HTN, CAD< ESRD s/p transplant seen for cpe\par \par Leg weakness - TIA vs CVA - referred to neurology\par \par T2DM - controlled. LDL 93. cont basalglar and ademolog -- check A1c today\par                will try to get ophtho records\par                 podiatry referral\par \par Kidney transplant -cont immunospuppresives\par HTN:                          pt taking only half losartan -- for ttoal of 50mg daily\par  \par CAD - stable\par \par Afib- has PPM\par \par HCM - cpe today\par            rec hcp/will/living will disussion with wife\par             labs today: prostate, thyroid, covid ab, b12, hiv, hep c\par             gi for colonoscopy\par             dexa referral\par \par Letter provided for social security\par \par FLu shot today

## 2020-09-01 NOTE — HISTORY OF PRESENT ILLNESS
[FreeTextEntry1] : cpe [de-identified] : 56yo M with kidney transplant, seen for CPE. \par \par Neurology:\par #Trouble walking -in [place], awoke one morning and had difficulty walking, also notes R arm was weak; had to use cane to get around that day, symptom improved. he did not seek care because of insurance issues while being in [place]\par \par \par #Disability: cognitive and physical -Needs a letter  annually for social security disability \par \par HCM:\par # Colonoscopy  -he is due and needs a referral\par \par CV: \par HTN - was prescribed Losartan 100mg but takes only half the tablet\par Hx of PPM and stent - Pt sees cardiology -\par \par Renal:\par hx of kidney transplant in 2014 - maintained on tacrolimus and mycophenolate\par \par [facility]\par \par ROS: +leg weakness, long toe nails, no CP, no SOB, no n/v.\par Remainder of ROS reviewed and found to be negative.\par

## 2020-09-01 NOTE — PHYSICAL EXAM
[Soft] : abdomen soft [None] : no ulcers in either foot were found [] : both feet [de-identified] : middle aged M, pleasant today [de-identified] : eomi, wears glasses to read/write [de-identified] : dry oral mucosa [de-identified] : supple [de-identified] : cta-b [de-identified] : normal s1 + s2, rrr [de-identified] : L AV fistula - on thrill heard [de-identified] : strength slightly decreasd on R vs L but can reista against gravity, sensation intact

## 2020-10-09 ENCOUNTER — APPOINTMENT (OUTPATIENT)
Dept: ELECTROPHYSIOLOGY | Facility: CLINIC | Age: 55
End: 2020-10-09
Payer: MEDICAID

## 2020-10-09 PROCEDURE — 93294 REM INTERROG EVL PM/LDLS PM: CPT

## 2020-10-09 PROCEDURE — 93296 REM INTERROG EVL PM/IDS: CPT

## 2020-10-29 ENCOUNTER — NON-APPOINTMENT (OUTPATIENT)
Age: 55
End: 2020-10-29

## 2020-10-29 ENCOUNTER — APPOINTMENT (OUTPATIENT)
Dept: CARDIOLOGY | Facility: CLINIC | Age: 55
End: 2020-10-29
Payer: COMMERCIAL

## 2020-10-29 ENCOUNTER — EMERGENCY (EMERGENCY)
Facility: HOSPITAL | Age: 55
LOS: 1 days | Discharge: HOME | End: 2020-10-29
Attending: STUDENT IN AN ORGANIZED HEALTH CARE EDUCATION/TRAINING PROGRAM | Admitting: STUDENT IN AN ORGANIZED HEALTH CARE EDUCATION/TRAINING PROGRAM
Payer: COMMERCIAL

## 2020-10-29 VITALS — DIASTOLIC BLOOD PRESSURE: 99 MMHG | SYSTOLIC BLOOD PRESSURE: 182 MMHG

## 2020-10-29 VITALS
OXYGEN SATURATION: 97 % | HEIGHT: 67 IN | SYSTOLIC BLOOD PRESSURE: 211 MMHG | BODY MASS INDEX: 25.9 KG/M2 | HEART RATE: 60 BPM | RESPIRATION RATE: 16 BRPM | TEMPERATURE: 96.2 F | WEIGHT: 165 LBS | DIASTOLIC BLOOD PRESSURE: 115 MMHG

## 2020-10-29 VITALS
DIASTOLIC BLOOD PRESSURE: 96 MMHG | HEART RATE: 60 BPM | SYSTOLIC BLOOD PRESSURE: 149 MMHG | TEMPERATURE: 98 F | RESPIRATION RATE: 16 BRPM | HEIGHT: 67 IN | OXYGEN SATURATION: 100 %

## 2020-10-29 VITALS
TEMPERATURE: 98 F | RESPIRATION RATE: 18 BRPM | DIASTOLIC BLOOD PRESSURE: 95 MMHG | HEART RATE: 60 BPM | OXYGEN SATURATION: 100 % | SYSTOLIC BLOOD PRESSURE: 202 MMHG

## 2020-10-29 DIAGNOSIS — I10 ESSENTIAL (PRIMARY) HYPERTENSION: ICD-10-CM

## 2020-10-29 DIAGNOSIS — Z94.0 KIDNEY TRANSPLANT STATUS: Chronic | ICD-10-CM

## 2020-10-29 DIAGNOSIS — I42.9 CARDIOMYOPATHY, UNSPECIFIED: ICD-10-CM

## 2020-10-29 DIAGNOSIS — Z92.29 PERSONAL HISTORY OF OTHER DRUG THERAPY: ICD-10-CM

## 2020-10-29 DIAGNOSIS — I25.10 ATHEROSCLEROTIC HEART DISEASE OF NATIVE CORONARY ARTERY W/OUT ANGINA PECTORIS: ICD-10-CM

## 2020-10-29 DIAGNOSIS — Z95.0 PRESENCE OF CARDIAC PACEMAKER: Chronic | ICD-10-CM

## 2020-10-29 DIAGNOSIS — S91.302A UNSPECIFIED OPEN WOUND, LEFT FOOT, INITIAL ENCOUNTER: ICD-10-CM

## 2020-10-29 DIAGNOSIS — Z23 ENCOUNTER FOR IMMUNIZATION: ICD-10-CM

## 2020-10-29 DIAGNOSIS — Z11.59 ENCOUNTER FOR SCREENING FOR OTHER VIRAL DISEASES: ICD-10-CM

## 2020-10-29 LAB
ALBUMIN SERPL ELPH-MCNC: 4.5 G/DL — SIGNIFICANT CHANGE UP (ref 3.3–5)
ALP SERPL-CCNC: 69 U/L — SIGNIFICANT CHANGE UP (ref 40–120)
ALT FLD-CCNC: 16 U/L — SIGNIFICANT CHANGE UP (ref 4–41)
ANION GAP SERPL CALC-SCNC: 14 MMO/L — SIGNIFICANT CHANGE UP (ref 7–14)
APTT BLD: 41.8 SEC — HIGH (ref 27–36.3)
AST SERPL-CCNC: 45 U/L — HIGH (ref 4–40)
BASOPHILS # BLD AUTO: 0.02 K/UL — SIGNIFICANT CHANGE UP (ref 0–0.2)
BASOPHILS NFR BLD AUTO: 0.3 % — SIGNIFICANT CHANGE UP (ref 0–2)
BILIRUB SERPL-MCNC: 0.5 MG/DL — SIGNIFICANT CHANGE UP (ref 0.2–1.2)
BUN SERPL-MCNC: 34 MG/DL — HIGH (ref 7–23)
CALCIUM SERPL-MCNC: 10 MG/DL — SIGNIFICANT CHANGE UP (ref 8.4–10.5)
CHLORIDE SERPL-SCNC: 99 MMOL/L — SIGNIFICANT CHANGE UP (ref 98–107)
CO2 SERPL-SCNC: 18 MMOL/L — LOW (ref 22–31)
CREAT SERPL-MCNC: 1.45 MG/DL — HIGH (ref 0.5–1.3)
EOSINOPHIL # BLD AUTO: 0.28 K/UL — SIGNIFICANT CHANGE UP (ref 0–0.5)
EOSINOPHIL NFR BLD AUTO: 3.6 % — SIGNIFICANT CHANGE UP (ref 0–6)
GLUCOSE SERPL-MCNC: 373 MG/DL — HIGH (ref 70–99)
HCT VFR BLD CALC: 45.8 % — SIGNIFICANT CHANGE UP (ref 39–50)
HGB BLD-MCNC: 13.9 G/DL — SIGNIFICANT CHANGE UP (ref 13–17)
IMM GRANULOCYTES NFR BLD AUTO: 0.3 % — SIGNIFICANT CHANGE UP (ref 0–1.5)
INR BLD: 1.19 — HIGH (ref 0.88–1.16)
LYMPHOCYTES # BLD AUTO: 1.21 K/UL — SIGNIFICANT CHANGE UP (ref 1–3.3)
LYMPHOCYTES # BLD AUTO: 15.6 % — SIGNIFICANT CHANGE UP (ref 13–44)
MAGNESIUM SERPL-MCNC: 1.8 MG/DL — SIGNIFICANT CHANGE UP (ref 1.6–2.6)
MCHC RBC-ENTMCNC: 25.8 PG — LOW (ref 27–34)
MCHC RBC-ENTMCNC: 30.3 % — LOW (ref 32–36)
MCV RBC AUTO: 85.1 FL — SIGNIFICANT CHANGE UP (ref 80–100)
MONOCYTES # BLD AUTO: 0.64 K/UL — SIGNIFICANT CHANGE UP (ref 0–0.9)
MONOCYTES NFR BLD AUTO: 8.3 % — SIGNIFICANT CHANGE UP (ref 2–14)
NEUTROPHILS # BLD AUTO: 5.57 K/UL — SIGNIFICANT CHANGE UP (ref 1.8–7.4)
NEUTROPHILS NFR BLD AUTO: 71.9 % — SIGNIFICANT CHANGE UP (ref 43–77)
NRBC # FLD: 0 K/UL — SIGNIFICANT CHANGE UP (ref 0–0)
PHOSPHATE SERPL-MCNC: 3.2 MG/DL — SIGNIFICANT CHANGE UP (ref 2.5–4.5)
PLATELET # BLD AUTO: 103 K/UL — LOW (ref 150–400)
PMV BLD: 12.4 FL — SIGNIFICANT CHANGE UP (ref 7–13)
POTASSIUM SERPL-MCNC: 5.5 MMOL/L — HIGH (ref 3.5–5.3)
POTASSIUM SERPL-SCNC: 5.5 MMOL/L — HIGH (ref 3.5–5.3)
PROT SERPL-MCNC: 8.1 G/DL — SIGNIFICANT CHANGE UP (ref 6–8.3)
PROTHROM AB SERPL-ACNC: 13.6 SEC — SIGNIFICANT CHANGE UP (ref 10.6–13.6)
RBC # BLD: 5.38 M/UL — SIGNIFICANT CHANGE UP (ref 4.2–5.8)
RBC # FLD: 14.1 % — SIGNIFICANT CHANGE UP (ref 10.3–14.5)
SODIUM SERPL-SCNC: 131 MMOL/L — LOW (ref 135–145)
WBC # BLD: 7.74 K/UL — SIGNIFICANT CHANGE UP (ref 3.8–10.5)
WBC # FLD AUTO: 7.74 K/UL — SIGNIFICANT CHANGE UP (ref 3.8–10.5)

## 2020-10-29 PROCEDURE — 99072 ADDL SUPL MATRL&STAF TM PHE: CPT

## 2020-10-29 PROCEDURE — 93000 ELECTROCARDIOGRAM COMPLETE: CPT

## 2020-10-29 PROCEDURE — 70450 CT HEAD/BRAIN W/O DYE: CPT | Mod: 26

## 2020-10-29 PROCEDURE — 93010 ELECTROCARDIOGRAM REPORT: CPT

## 2020-10-29 PROCEDURE — 99214 OFFICE O/P EST MOD 30 MIN: CPT

## 2020-10-29 PROCEDURE — 99284 EMERGENCY DEPT VISIT MOD MDM: CPT

## 2020-10-29 RX ORDER — ACETAMINOPHEN 500 MG
650 TABLET ORAL ONCE
Refills: 0 | Status: COMPLETED | OUTPATIENT
Start: 2020-10-29 | End: 2020-10-29

## 2020-10-29 RX ORDER — LOSARTAN POTASSIUM 100 MG/1
50 TABLET, FILM COATED ORAL ONCE
Refills: 0 | Status: DISCONTINUED | OUTPATIENT
Start: 2020-10-29 | End: 2020-11-02

## 2020-10-29 RX ORDER — INSULIN LISPRO 100/ML
5 VIAL (ML) SUBCUTANEOUS ONCE
Refills: 0 | Status: COMPLETED | OUTPATIENT
Start: 2020-10-29 | End: 2020-10-29

## 2020-10-29 RX ORDER — LOSARTAN POTASSIUM 100 MG/1
50 TABLET, FILM COATED ORAL ONCE
Refills: 0 | Status: COMPLETED | OUTPATIENT
Start: 2020-10-29 | End: 2020-10-29

## 2020-10-29 RX ORDER — LIDOCAINE 4 G/100G
1 CREAM TOPICAL ONCE
Refills: 0 | Status: COMPLETED | OUTPATIENT
Start: 2020-10-29 | End: 2020-10-29

## 2020-10-29 RX ADMIN — Medication 650 MILLIGRAM(S): at 13:51

## 2020-10-29 RX ADMIN — LIDOCAINE 1 PATCH: 4 CREAM TOPICAL at 13:54

## 2020-10-29 RX ADMIN — Medication 5 UNIT(S): at 15:34

## 2020-10-29 RX ADMIN — LOSARTAN POTASSIUM 50 MILLIGRAM(S): 100 TABLET, FILM COATED ORAL at 15:35
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
## 2020-10-29 NOTE — ED PROVIDER NOTE - NSFOLLOWUPINSTRUCTIONS_ED_ALL_ED_FT
1) Follow up with your doctor within 1-3 days of being seen in the Emergency Department   2) Return to the ED immediately for new or worsening symptoms severe headache, dizziness, unable to walk, severe pain, numbness, or tingling   3) Please continue to take any home medications as prescribed  4) Your test results from your ED visit were discussed with you prior to discharge  5) You were provided with a copy of your test results  6) You can take Tylenol as needed for the pain. Follow instructions on package. 1) Follow up with your doctor within 1-3 days of being seen in the Emergency Department   2) Return to the ED immediately for new or worsening symptoms severe headache, dizziness, unable to walk, severe pain, numbness, or tingling   3) Please continue to take any home medications as prescribed  4) Your test results from your ED visit were discussed with you prior to discharge  5) You were provided with a copy of your test results  6) You can take Tylenol as needed for the pain. Follow instructions on package.  7) Follow up with your doctor for evaluation of the high blood pressure. Please take your medications as prescribed

## 2020-10-29 NOTE — ED PROVIDER NOTE - PROGRESS NOTE DETAILS
Dariel Kelly, PGY-1- Discussed results of work up with patient. He was made aware of need to follow up with his PCP for the leg weakness. Patient ambulated well again in ED. Requesting social work to arrange transport home. Will d/c. Strict return precautions given. All questions answered. Patient expressed agreement Dariel Kelly, PGY-1- Patient BP still elevated after receiving Losartan 50 mg. Patient reports taking 100 mg at home. Will give additional 50 mg and reassess in 30 min Dariel Kelly, PGY-1- Patient refusing additional 50 mg Losartan. Has prescription from doctor for 100 mg Losartan 1x a day. Discussed the high blood pressure and risk of end organ damage with patient. He is still refusing. Will d/c patient home. Advised of when to come back to ED.

## 2020-10-29 NOTE — ED PROVIDER NOTE - PHYSICAL EXAMINATION
General: well appearing, no acute distress, AOx3  Skin: normal color for race, no rash  Head: normocephalic, atraumatic  Eyes: clear conjunctiva, EOMI  ENMT: airway patent, no nasal discharge  Cardiovascular: normal rate, normal rhythm, S1/S2  Pulmonary: clear to auscultation bilaterally, no rales, rhonchi, or wheeze  Abdomen: soft, nontender, no ecchymosis   Musculoskeletal: moving extremities well, no deformity, ambulatory with cane in ED. Mildly decreased strength b/l lower extremities, appears chronic as per previous records. No c-spine or midline spinal tenderness. mild lumbar paraspinal tenderness   Neuro: CN II-XII intact, normal motor  Psych: normal mood, normal affect
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
## 2020-10-29 NOTE — HISTORY OF PRESENT ILLNESS
[FreeTextEntry1] : Doing okay. Denies chest pain, shortness of breath or palpitations. Missed his turn while trying to get here. Only took metoprolol this morning. Losartan 100 mg daily seems to make him dizzy.

## 2020-10-29 NOTE — ED ADULT NURSE REASSESSMENT NOTE - NS ED NURSE REASSESS COMMENT FT1
Pt is hypertensive, Pt refusing BP meds. Pt educated on risks and benefits, pt still refusing BP meds. MD aware.
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
## 2020-10-29 NOTE — PHYSICAL EXAM
[General Appearance - Well Developed] : well developed [Normal Appearance] : normal appearance [Well Groomed] : well groomed [General Appearance - Well Nourished] : well nourished [No Deformities] : no deformities [General Appearance - In No Acute Distress] : no acute distress [Normal Conjunctiva] : the conjunctiva exhibited no abnormalities [Eyelids - No Xanthelasma] : the eyelids demonstrated no xanthelasmas [Normal Oral Mucosa] : normal oral mucosa [No Oral Pallor] : no oral pallor [No Oral Cyanosis] : no oral cyanosis [Respiration, Rhythm And Depth] : normal respiratory rhythm and effort [Exaggerated Use Of Accessory Muscles For Inspiration] : no accessory muscle use [Auscultation Breath Sounds / Voice Sounds] : lungs were clear to auscultation bilaterally [Heart Rate And Rhythm] : heart rate and rhythm were normal [Heart Sounds] : normal S1 and S2 [Murmurs] : no murmurs present [Edema] : no peripheral edema present [Abdomen Soft] : soft [Abdomen Tenderness] : non-tender [Cyanosis, Localized] : no localized cyanosis [Skin Color & Pigmentation] : normal skin color and pigmentation [] : no rash [No Venous Stasis] : no venous stasis [Oriented To Time, Place, And Person] : oriented to person, place, and time [FreeTextEntry1] : left forearm fistula
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
## 2020-10-29 NOTE — DISCUSSION/SUMMARY
[Cardiomyopathy] : cardiomyopathy [Coronary Artery Disease] : coronary artery disease [Stable] : stable [Hypertension] : hypertension [FreeTextEntry1] : \par Currently stable from a cardiovascular standpoint. Hypertensive (only took metoprolol this morning, also had some de la cruz last night that was salty). History of ischemic cardiomyopathy. Currently appears euvolemic. Stable CAD. No ischemic or CHF symptoms. Will reduce losartan to 50 mg daily. Continue all other current medications. ECG completed today and reviewed (findings as noted above). Patient to follow BP's at home. He says its usually in 130's mmHg systolic. Follow up in 1-2 months for BP reassessment.

## 2020-10-29 NOTE — ED ADULT NURSE NOTE - INTERVENTIONS DEFINITIONS
Review medications for side effects contributing to fall risk/[place] to call system/Instruct patient to call for assistance/Room bathroom lighting operational/Non-slip footwear when patient is off stretcher/Physically safe environment: no spills, clutter or unnecessary equipment/Stretcher in lowest position, wheels locked, appropriate side rails in place/Monitor for mental status changes and reorient to person, place, and time/Reinforce activity limits and safety measures with patient and family/Call bell, personal items and telephone within reach/Monitor gait and stability
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
## 2020-10-29 NOTE — ED PROVIDER NOTE - PATIENT PORTAL LINK FT
You can access the FollowMyHealth Patient Portal offered by [facility] by registering at the following website: [email or web address]. By joining [facility]’s FollowMyHealth portal, you will also be able to view your health information using other applications (apps) compatible with our system. You can access the FollowMyHealth Patient Portal offered by [facility] by registering at the following website: [email or web address]. By joining [facility]’s FollowMyHealth portal, you will also be able to view your health information using other applications (apps) compatible with our system.

## 2020-11-02 RX ORDER — CALCIUM CARBONATE 300MG(750)
1000 TABLET,CHEWABLE ORAL DAILY
Qty: 30 | Refills: 0 | Status: ACTIVE | COMMUNITY
Start: 2020-11-02 | End: 1900-01-01

## 2020-11-06 LAB
ESTIMATED AVERAGE GLUCOSE: 226 MG/DL
HBA1C MFR BLD HPLC: 9.5 %
HCV AB SER QL: NONREACTIVE
HCV S/CO RATIO: 0.49 S/CO
HIV1+2 AB SPEC QL IA.RAPID: NONREACTIVE
PSA SERPL-MCNC: 0.39 NG/ML
SARS-COV-2 IGG SERPL IA-ACNC: 150 AU/ML
SARS-COV-2 IGG SERPL QL IA: POSITIVE
TSH SERPL-ACNC: 2.16 UIU/ML
VIT B12 SERPL-MCNC: 199 PG/ML

## 2020-11-25 ENCOUNTER — APPOINTMENT (OUTPATIENT)
Dept: ELECTROPHYSIOLOGY | Facility: CLINIC | Age: 55
End: 2020-11-25

## 2020-11-25 ENCOUNTER — APPOINTMENT (OUTPATIENT)
Dept: CARDIOLOGY | Facility: CLINIC | Age: 55
End: 2020-11-25

## 2020-12-01 ENCOUNTER — APPOINTMENT (OUTPATIENT)
Dept: INTERNAL MEDICINE | Facility: CLINIC | Age: 55
End: 2020-12-01

## 2021-01-08 ENCOUNTER — APPOINTMENT (OUTPATIENT)
Dept: ELECTROPHYSIOLOGY | Facility: CLINIC | Age: 56
End: 2021-01-08

## 2021-01-21 ENCOUNTER — FORM ENCOUNTER (OUTPATIENT)
Age: 56
End: 2021-01-21

## 2021-01-26 RX ORDER — INSULIN LISPRO 100 U/ML
100 INJECTION, SOLUTION SUBCUTANEOUS
Qty: 110 | Refills: 3 | Status: ACTIVE | COMMUNITY
Start: 2018-08-31 | End: 1900-01-01

## 2021-01-26 RX ORDER — INSULIN GLARGINE 100 [IU]/ML
100 INJECTION, SOLUTION SUBCUTANEOUS
Qty: 15 | Refills: 5 | Status: COMPLETED | COMMUNITY
Start: 2017-10-16 | End: 2021-01-26

## 2021-01-26 RX ORDER — INSULIN GLARGINE 100 [IU]/ML
100 INJECTION, SOLUTION SUBCUTANEOUS
Qty: 100 | Refills: 0 | Status: ACTIVE | COMMUNITY
Start: 2021-01-26 | End: 1900-01-01

## 2021-02-11 RX ORDER — APIXABAN 5 MG/1
5 TABLET, FILM COATED ORAL
Qty: 60 | Refills: 5 | Status: ACTIVE | COMMUNITY
Start: 2019-12-19 | End: 1900-01-01

## 2021-02-11 RX ORDER — BLOOD SUGAR DIAGNOSTIC
STRIP MISCELLANEOUS 4 TIMES DAILY
Qty: 360 | Refills: 3 | Status: ACTIVE | COMMUNITY
Start: 2020-08-13 | End: 1900-01-01

## 2021-02-11 RX ORDER — METOPROLOL SUCCINATE 50 MG/1
50 TABLET, EXTENDED RELEASE ORAL DAILY
Qty: 90 | Refills: 1 | Status: ACTIVE | COMMUNITY
Start: 2019-04-02 | End: 1900-01-01

## 2021-03-24 RX ORDER — OMEGA-3-ACID ETHYL ESTERS CAPSULES 1 G/1
1 CAPSULE, LIQUID FILLED ORAL
Qty: 360 | Refills: 1 | Status: ACTIVE | COMMUNITY
Start: 2017-04-11 | End: 1900-01-01

## 2021-03-24 RX ORDER — BLOOD-GLUCOSE METER
31G X 5 MM EACH MISCELLANEOUS
Qty: 120 | Refills: 1 | Status: ACTIVE | COMMUNITY
Start: 2019-04-02 | End: 1900-01-01

## 2021-03-24 RX ORDER — ADHESIVE TAPE 3"X 2.3 YD
50 MCG TAPE, NON-MEDICATED TOPICAL
Qty: 90 | Refills: 1 | Status: ACTIVE | COMMUNITY
Start: 2019-05-21 | End: 1900-01-01

## 2021-04-09 ENCOUNTER — NON-APPOINTMENT (OUTPATIENT)
Age: 56
End: 2021-04-09

## 2021-04-09 ENCOUNTER — APPOINTMENT (OUTPATIENT)
Dept: ELECTROPHYSIOLOGY | Facility: CLINIC | Age: 56
End: 2021-04-09
Payer: MEDICAID

## 2021-04-09 PROCEDURE — 93294 REM INTERROG EVL PM/LDLS PM: CPT

## 2021-04-09 PROCEDURE — 93296 REM INTERROG EVL PM/IDS: CPT

## 2021-04-28 RX ORDER — LANCETS 33 GAUGE
EACH MISCELLANEOUS
Qty: 200 | Refills: 4 | Status: ACTIVE | COMMUNITY
Start: 2017-12-21 | End: 1900-01-01

## 2021-07-12 ENCOUNTER — NON-APPOINTMENT (OUTPATIENT)
Age: 56
End: 2021-07-12

## 2022-01-11 NOTE — DISCHARGE NOTE ADULT - INSTRUCTIONS
FAMILY HISTORY:  No pertinent family history in first degree relatives Renal , Consistent Carbohydrate

## 2022-04-11 PROBLEM — Z11.59 SCREENING FOR VIRAL DISEASE: Status: RESOLVED | Noted: 2020-09-01 | Resolved: 2022-04-11

## 2022-04-16 NOTE — PATIENT PROFILE ADULT. - ANESTHESIA, PREVIOUS REACTION, PROFILE
Pt currently walking around room on a bedpad  Refusing to allow me to pick it up  Explained it is a tripping hazard, still refusing to get off it        Peng Tipton  04/15/22 [id number] none

## 2022-09-13 NOTE — PROGRESS NOTE ADULT - ASSESSMENT
53 year old man with a history of DDRT (2014), CAD, DM2, HTN, siezure admitted with bactermia and RICCO. 53 year old man with a history of DDRT (2014), CAD, DM2, HTN, siezure admitted with bacteremia and RICCO. 1

## 2023-01-06 NOTE — PROGRESS NOTE ADULT - PROBLEM SELECTOR PLAN 4
-AIC at outside facilty 6/26/18 was 7  -c/w ISS  -Podiatry consulted -AIC at outside facilty 6/26/18 was 7  -c/w ISS Infliximab Counseling:  I discussed with the patient the risks of infliximab including but not limited to myelosuppression, immunosuppression, autoimmune hepatitis, demyelinating diseases, lymphoma, and serious infections.  The patient understands that monitoring is required including a PPD at baseline and must alert us or the primary physician if symptoms of infection or other concerning signs are noted.

## 2025-02-06 NOTE — PROGRESS NOTE ADULT - PROBLEM SELECTOR PLAN 6
-hold home losartan  -continue isosorbide mononitrate -hold home losartan  -continue isosorbide mononitrate  -started on Hydralazine No